# Patient Record
Sex: MALE | Race: BLACK OR AFRICAN AMERICAN | NOT HISPANIC OR LATINO | Employment: UNEMPLOYED | URBAN - METROPOLITAN AREA
[De-identification: names, ages, dates, MRNs, and addresses within clinical notes are randomized per-mention and may not be internally consistent; named-entity substitution may affect disease eponyms.]

---

## 2017-02-13 ENCOUNTER — OFFICE VISIT (OUTPATIENT)
Dept: INTERNAL MEDICINE | Facility: CLINIC | Age: 22
End: 2017-02-13
Payer: COMMERCIAL

## 2017-02-13 VITALS
HEART RATE: 95 BPM | WEIGHT: 146.38 LBS | BODY MASS INDEX: 19.4 KG/M2 | HEIGHT: 73 IN | DIASTOLIC BLOOD PRESSURE: 84 MMHG | SYSTOLIC BLOOD PRESSURE: 124 MMHG | TEMPERATURE: 98 F

## 2017-02-13 DIAGNOSIS — G44.219 EPISODIC TENSION-TYPE HEADACHE, NOT INTRACTABLE: Primary | ICD-10-CM

## 2017-02-13 PROCEDURE — 99999 PR PBB SHADOW E&M-EST. PATIENT-LVL III: CPT | Mod: PBBFAC,,, | Performed by: INTERNAL MEDICINE

## 2017-02-13 PROCEDURE — 99212 OFFICE O/P EST SF 10 MIN: CPT | Mod: S$GLB,,, | Performed by: INTERNAL MEDICINE

## 2017-02-14 NOTE — PROGRESS NOTES
Subjective:       Patient ID: Darci Vaughan Jr. is a 21 y.o. male.    Chief Complaint: Dizziness    HPI     Mr. Vaughan is a 20 yo male with hx of chronic pneumothorax s/p VATS in 2012 who presents to clinic complaining of headaches for the last 3 weeks. He states 3 weeks ago he had an episode of dizziness which he described as feeling unsteady on his feet and presented to ED at . He was given Meclizine and he states work up done there was negative although he cannot recall exactly what work up was done. He states since that time he has been having retroorbital and bilateral temporal headaches that he describes as a 6/10 pressure. He states they occur every other day and will occur 4-5 times a day. They last for around 10 minutes and resolve without intervention. He has not tried anything of the pain. The headaches only occur at work. 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 He states a few days ago he broke his glasses and has not been wearing them. He denies nausea, vomiting, SOB, neck pain or chest pain.       Review of Systems   Constitutional: Negative for chills, fatigue and fever.   HENT: Negative for facial swelling, sinus pressure, sneezing and sore throat.    Eyes: Positive for visual disturbance. Negative for photophobia.   Respiratory: Negative for cough, shortness of breath, wheezing and stridor.    Cardiovascular: Negative for chest pain, palpitations and leg swelling.   Gastrointestinal: Negative for abdominal pain, diarrhea, nausea and vomiting.   Endocrine: Negative for polydipsia and polyuria.   Genitourinary: Negative for flank pain, hematuria, testicular pain and urgency.   Musculoskeletal: Negative for arthralgias, back pain and myalgias.   Skin: Negative for pallor and rash.   Allergic/Immunologic: Negative for environmental allergies and immunocompromised state.   Neurological: Positive for dizziness and headaches. Negative for tremors, seizures, syncope, speech difficulty, weakness and numbness.    Hematological: Negative for adenopathy. Does not bruise/bleed easily.   Psychiatric/Behavioral: Negative for agitation and behavioral problems.       Objective:      Physical Exam   Constitutional: He is oriented to person, place, and time. He appears well-developed and well-nourished.   HENT:   Head: Normocephalic and atraumatic.   Eyes: EOM are normal. Pupils are equal, round, and reactive to light.   Neck: Normal range of motion. No JVD present. No thyromegaly present.   Cardiovascular: Normal rate and regular rhythm.  Exam reveals no friction rub.    No murmur heard.  Pulmonary/Chest: Effort normal and breath sounds normal. No respiratory distress.   Abdominal: Soft. He exhibits no distension. There is no tenderness.   Musculoskeletal: Normal range of motion. He exhibits no edema.   Lymphadenopathy:     He has no cervical adenopathy.   Neurological: He is alert and oriented to person, place, and time. He has normal strength. No cranial nerve deficit or sensory deficit. Coordination normal.   Skin: Skin is warm and dry. No erythema.   Psychiatric: He has a normal mood and affect. His behavior is normal.       Assessment:       1. Episodic tension-type headache, not intractable        Plan:         Darci was seen today for dizziness.    Diagnoses and all orders for this visit:    Episodic tension-type headache, not intractable    -Likely secondary to worsening vision and lack of eye glasses. Advised patient to keep optometry appointment on Friday and buy glasses. Advises patient to return to clinic if headaches do not go away one vision in corrected or if headaches become worse and he begins to experience nausea, vomiting or weakness.     Discussed with Dr. Urban.     Saray Vieyra MD PGY-2

## 2017-02-15 NOTE — PROGRESS NOTES
Patient examined, record reviewed, agree with findings and recommendations as documented above. Unclear etiology of headache and dizziness at this time. Pt instructed to get new glasses first but if symptoms persist notify the office.  Zia Urban MD  Internal Medicine

## 2017-03-21 VITALS
WEIGHT: 130 LBS | BODY MASS INDEX: 17.23 KG/M2 | DIASTOLIC BLOOD PRESSURE: 73 MMHG | HEART RATE: 88 BPM | OXYGEN SATURATION: 99 % | TEMPERATURE: 98 F | RESPIRATION RATE: 18 BRPM | SYSTOLIC BLOOD PRESSURE: 126 MMHG | HEIGHT: 73 IN

## 2017-03-21 PROCEDURE — 99284 EMERGENCY DEPT VISIT MOD MDM: CPT

## 2017-03-21 PROCEDURE — 93005 ELECTROCARDIOGRAM TRACING: CPT

## 2017-03-21 PROCEDURE — 99285 EMERGENCY DEPT VISIT HI MDM: CPT | Mod: ,,, | Performed by: EMERGENCY MEDICINE

## 2017-03-22 ENCOUNTER — HOSPITAL ENCOUNTER (EMERGENCY)
Facility: HOSPITAL | Age: 22
Discharge: HOME | End: 2017-03-22
Attending: EMERGENCY MEDICINE
Payer: COMMERCIAL

## 2017-03-22 DIAGNOSIS — R07.81 PLEURITIC CHEST PAIN: Primary | ICD-10-CM

## 2017-03-22 LAB
DEPRECATED S PYO AG THROAT QL EIA: NEGATIVE
TROPONIN I SERPL DL<=0.01 NG/ML-MCNC: <0.006 NG/ML

## 2017-03-22 PROCEDURE — 87081 CULTURE SCREEN ONLY: CPT

## 2017-03-22 PROCEDURE — 93010 ELECTROCARDIOGRAM REPORT: CPT | Mod: ,,, | Performed by: INTERNAL MEDICINE

## 2017-03-22 PROCEDURE — 84484 ASSAY OF TROPONIN QUANT: CPT

## 2017-03-22 PROCEDURE — 87880 STREP A ASSAY W/OPTIC: CPT

## 2017-03-22 RX ORDER — NAPROXEN 500 MG/1
500 TABLET ORAL 2 TIMES DAILY WITH MEALS
Qty: 60 TABLET | Refills: 0 | Status: SHIPPED | OUTPATIENT
Start: 2017-03-22 | End: 2017-07-08

## 2017-03-22 NOTE — ED TRIAGE NOTES
Darci Kilgorelinda Gonzalez, a 21 y.o. male presents to the ED c/o left sided chest pain that started last night at 11 pm. Pt states he feels like he pulled a muscle while laughing at work. Pt denies SOB at this time. Pain is described as sharp and worsens during inspiration or laughing.        Chief Complaint   Patient presents with    Muscle Pain     Patient reports that he feels like he pulled a muscle to left chest. He reports pain x 1 week     Review of patient's allergies indicates:  No Known Allergies  Past Medical History:   Diagnosis Date    Atelectasis     Spontaneous pneumothorax 10/2012

## 2017-03-22 NOTE — ED PROVIDER NOTES
Encounter Date: 3/21/2017    SCRIBE #1 NOTE: I, Lazaro Pinto, am scribing for, and in the presence of, Lyndon Melo MD.       History     Chief Complaint   Patient presents with    Muscle Pain     Patient reports that he feels like he pulled a muscle to left chest. He reports pain x 1 week     Review of patient's allergies indicates:  No Known Allergies  HPI Comments: Time seen by provider: 4:28 AM    This is a 21 y.o. male with PMHx of Spontaneous Pneumothorax x2 who presents with complaint of left sided chest pain since yesterday. Pt states while at work yesterday he felt like he pulled a muscle in his chest. Pain exacerbated with laughing, movement in certain positions and deep breathing. Denies pain when resting, cough, wheezing, SOB. States pain is not as severe as Hx of Pneumothorax.  Denies Hx of Asthma. Also c/o sore throat and bilateral ear fullness w/o pain. Denies fever or chills.  No further complaints or concerns at this time.     The history is provided by the patient and medical records.     Past Medical History:   Diagnosis Date    Atelectasis     Spontaneous pneumothorax 10/2012     Past Surgical History:   Procedure Laterality Date    BLEBECTOMY  Dec 2012    for recurrent spontaneous L pneumothorax    CHEST TUBE INSERTION       Family History   Problem Relation Age of Onset    Cancer Maternal Grandmother      breast    Emphysema Maternal Grandfather     Seizures Sister     Allergies Sister      Social History   Substance Use Topics    Smoking status: Passive Smoke Exposure - Never Smoker    Smokeless tobacco: None    Alcohol use No     Review of Systems   Constitutional: Negative for chills and fever.   HENT: Positive for sore throat.         (+) ear fullness w/o pain   Respiratory: Negative for cough, shortness of breath and wheezing.    Cardiovascular: Positive for chest pain.   Gastrointestinal: Negative for nausea.   Genitourinary: Negative for dysuria.   Musculoskeletal: Negative  for back pain.   Skin: Negative for rash.   Neurological: Negative for weakness.   Hematological: Does not bruise/bleed easily.       Physical Exam   Initial Vitals   BP Pulse Resp Temp SpO2   03/21/17 2344 03/21/17 2344 03/21/17 2344 03/21/17 2344 03/21/17 2344   126/73 88 18 98.4 °F (36.9 °C) 99 %     Physical Exam    Nursing note and vitals reviewed.  Constitutional: He appears well-developed and well-nourished. No distress.   HENT:   Head: Normocephalic and atraumatic.   Pharyngeal erythema w/o exudate. Copious cerumen in left external auditory meatus. Right TM normal.   Eyes: EOM are normal. Pupils are equal, round, and reactive to light.   Neck: Normal range of motion. Neck supple.   Cardiovascular: Normal rate, regular rhythm, normal heart sounds and intact distal pulses. Exam reveals no friction rub.    No murmur heard.  Pulmonary/Chest: Breath sounds normal. No respiratory distress. He has no wheezes. He has no rhonchi. He has no rales. He exhibits no tenderness.   No reproducible chest wall tenderness on palpation   Abdominal: Soft. He exhibits no distension. There is no tenderness. There is no rebound and no guarding.   Musculoskeletal: Normal range of motion. He exhibits no edema.   Lymphadenopathy:     He has cervical adenopathy (bilateral).   Neurological: He is alert and oriented to person, place, and time. He has normal strength. No sensory deficit.   Skin: Skin is warm and dry. No rash noted.         ED Course   Procedures  Labs Reviewed   THROAT SCREEN, RAPID   CULTURE, STREP A,  THROAT   TROPONIN I     EKG Readings: (Independently Interpreted)   EKG: NSR at 85 bpm, nl axis, nl intervals, no hypertrophy, no ST-T changes as read by me. There are no significant changes in comparison to previous EKG on 1/5/14. RBBB resolved.          X-Rays:   Independently Interpreted Readings:   Chest X-Ray: Normal heart size.  No infiltrates.  No acute abnormalities.     Medical Decision Making:   History:   Old  Medical Records: I decided to obtain old medical records.  Initial Assessment:   21 year old male presents with reproducible left sided chest pain x2 days. DDx includes but is not limited to pleurisy, MSK, chest wall pain, Pneumothorax, less likely PE, PNA and ACS. Plan to do CXR, EKG, Troponin and rapid STREP for sore throat.   Independently Interpreted Test(s):   I have ordered and independently interpreted X-rays - see prior notes.  I have ordered and independently interpreted EKG Reading(s) - see prior notes  Clinical Tests:   Lab Tests: Ordered and Reviewed  Radiological Study: Ordered and Reviewed  Medical Tests: Ordered and Reviewed            Scribe Attestation:   Scribe #1: I performed the above scribed service and the documentation accurately describes the services I performed. I attest to the accuracy of the note.    Attending Attestation:           Physician Attestation for Scribe:  Physician Attestation Statement for Scribe #1: I, Lyndon Melo MD, reviewed documentation, as scribed by Lazaro Pinto in my presence, and it is both accurate and complete.         Attending ED Notes:   Chest x-ray as independently interpreted by me was negative for any acute process including pneumothorax.  EKG was unremarkable, troponin was negative.  Normal O2 sats and vitals, no respiratory distress.  Patient's chest pain sounds pleuritic nature and I have low suspicion for PE given negative PERC rule.  Recommended anti-inflammatories as needed.  Follow-up with PCP or referred physician within one week or return for any concerns.          ED Course     Clinical Impression:   The encounter diagnosis was Pleuritic chest pain.    Disposition:   Disposition: Discharged  Condition: Stable       Lyndon Melo MD  03/26/17 0747

## 2017-03-22 NOTE — ED NOTES
Adult Physical Assessment  LOC: Darci Vaughan Jr., 21 y.o. male verified via two identifiers.  The patient is awake, alert, oriented and speaking appropriately at this time.  APPEARANCE: Patient resting comfortably and appears to be in no acute distress at this time. Patient is clean and well groomed, patient's clothing is properly fastened.  SKIN:The skin is warm and dry, color consistent with ethnicity, patient has normal skin turgor and moist mucus membranes, skin intact, no breakdown or brusing noted.  MUSCULOSKELETAL: Patient moving all extremities well, no obvious swelling or deformities noted.  RESPIRATORY: Airway is open and patent, respirations are spontaneous, patient has a normal effort and rate, no accessory muscle use noted. Pain with inspiration.  CARDIAC: Patient has a normal rate and rhythm, no periphreal edema noted in any extremity, capillary refill < 3 seconds in all extremities. Reports CP described that is sharp with laughter or breathing.  ABDOMEN: Soft and non tender to palpation, no abdominal distention noted. Bowel sounds present in all four quadrants.  NEUROLOGIC: Eyes open spontaneously, behavior appropriate to situation, follows commands, facial expression symmetrical, bilateral hand grasp equal and even, purposeful motor response noted, normal sensation in all extremities when touched with a finger.

## 2017-03-22 NOTE — ED AVS SNAPSHOT
[facility]  [address]  [facility] [address]               Darci Vaughan Jr.   3/22/2017  3:56 AM   ED    Description:  Male : 1995   Department:  [facility]           Your Care was Coordinated By:     Provider Role From To    Lyndon Melo MD Attending Provider 17 0406 --      Reason for Visit     Muscle Pain           Diagnoses this Visit        Comments    Pleuritic chest pain    -  Primary       ED Disposition     None           To Do List           Follow-up Information     Follow up with [facility]. Call in 3 days.    Specialty:  Internal Medicine    Contact information:    [address]  [facility] [address] [phone]    Additional information:    [facility]       These Medications        Disp Refills Start End    naproxen (NAPROSYN) 500 MG tablet 60 tablet 0 3/22/2017     Take 1 tablet (500 mg total) by mouth 2 (two) times daily with meals. - Oral    Pharmacy: [facility] [address] [place] [address] [address] Ph #: [phone]         [facility]     [facility] Nurse Care Line -  Assistance  Registered nurses in the [facility] provide clinical advisement, health education, appointment booking, and other advisory services.  Call for this free service at [phone].             Medications           Message regarding Medications     Verify the changes and/or additions to your medication regime listed below are the same as discussed with your clinician today.  If any of these changes or additions are incorrect, please notify your healthcare provider.        START taking these NEW medications        Refills    naproxen (NAPROSYN) 500 MG tablet 0    Sig: Take 1 tablet (500 mg total) by mouth 2 (two) times daily with meals.    Class: Print    Route: Oral           Verify that the below list of medications is an  "accurate representation of the medications you are currently taking.  If none reported, the list may be blank. If incorrect, please contact your healthcare provider. Carry this list with you in case of emergency.           Current Medications     naproxen (NAPROSYN) 500 MG tablet Take 1 tablet (500 mg total) by mouth 2 (two) times daily with meals.           Clinical Reference Information           Your Vitals Were     BP Pulse Temp Resp Height Weight    126/73 88 98.4 °F (36.9 °C) (Oral) 18 6' 1" (1.854 m) 59 kg (130 lb)    SpO2 BMI             99% 17.15 kg/m2         Allergies as of 3/22/2017     No Known Allergies      Immunizations Administered on Date of Encounter - 3/22/2017     None      ED Micro, Lab, POCT     Start Ordered       Status Ordering Provider    03/22/17 0437 03/22/17 0436  Troponin I  Once      Final result     03/22/17 0437 03/22/17 0436  Rapid strep screen  STAT      Final result     03/22/17 0436 03/22/17 0436  Strep A culture, throat  Once      In process       ED Imaging Orders     Start Ordered       Status Ordering Provider    03/22/17 0437 03/22/17 0436  X-Ray Chest PA And Lateral  1 time imaging      In process       Discharge References/Attachments     PLEURISY (ENGLISH)      [facility] Sign-Up     Activating your [facility] account is as easy as 1-2-3!     1) Visit [email or web address], select Sign Up Now, enter this activation code and your date of birth, then select Next.  Activation code not generated  Current Patient Portal Status: Account disabled      2) Create a username and password to use when you visit [facility] in the future and select a security question in case you lose your password and select Next.    3) Enter your e-mail address and click Sign Up!    Additional Information  If you have questions, please e-mail [email or web address] or call [phone] to talk to our [facility] staff. Remember, [facility] is NOT to be used for urgent needs. For medical emergencies, dial 911.    "      Smoking Cessation     If you would like to quit smoking:   You may be eligible for free services if you are a [place] resident and started smoking cigarettes before September 1, 1988.  Call the Smoking Cessation Trust (SCT) toll free at (083) 227-7802 or (254) 582-4608.   Call 1-800-QUIT-NOW if you do not meet the above criteria.             [facility] complies with applicable Federal civil rights laws and does not discriminate on the basis of race, color, national origin, age, disability, or sex.        Language Assistance Services     ATTENTION: Language assistance services are available, free of charge. Please call [phone].      ATENCIÓN: Si habla español, tiene a almazan disposición servicios gratuitos de asistencia lingüística. Llame al [phone].     CHÚ Ý: N?u b?n nói Ti?ng Vi?t, có các d?ch v? h? tr? ngôn ng? mi?n phí dành cho b?n. G?i s? [phone].

## 2017-03-24 LAB — BACTERIA THROAT CULT: NORMAL

## 2017-03-27 ENCOUNTER — TELEPHONE (OUTPATIENT)
Dept: INTERNAL MEDICINE | Facility: CLINIC | Age: 22
End: 2017-03-27

## 2017-03-27 ENCOUNTER — HOSPITAL ENCOUNTER (OUTPATIENT)
Dept: RADIOLOGY | Facility: HOSPITAL | Age: 22
Discharge: HOME | End: 2017-03-27
Attending: NURSE PRACTITIONER
Payer: COMMERCIAL

## 2017-03-27 ENCOUNTER — OFFICE VISIT (OUTPATIENT)
Dept: INTERNAL MEDICINE | Facility: CLINIC | Age: 22
End: 2017-03-27
Payer: COMMERCIAL

## 2017-03-27 VITALS
BODY MASS INDEX: 19.75 KG/M2 | HEART RATE: 93 BPM | WEIGHT: 149 LBS | DIASTOLIC BLOOD PRESSURE: 80 MMHG | OXYGEN SATURATION: 99 % | SYSTOLIC BLOOD PRESSURE: 110 MMHG | TEMPERATURE: 99 F | HEIGHT: 73 IN

## 2017-03-27 DIAGNOSIS — N50.819 TESTICLE PAIN: Primary | ICD-10-CM

## 2017-03-27 DIAGNOSIS — N50.811 PAIN IN RIGHT TESTICLE: ICD-10-CM

## 2017-03-27 DIAGNOSIS — N50.811 PAIN IN RIGHT TESTICLE: Primary | ICD-10-CM

## 2017-03-27 PROCEDURE — 99999 PR PBB SHADOW E&M-EST. PATIENT-LVL IV: CPT | Mod: PBBFAC,,, | Performed by: NURSE PRACTITIONER

## 2017-03-27 PROCEDURE — 76870 US EXAM SCROTUM: CPT | Mod: TC

## 2017-03-27 PROCEDURE — 76870 US EXAM SCROTUM: CPT | Mod: 26,,, | Performed by: RADIOLOGY

## 2017-03-27 PROCEDURE — 99213 OFFICE O/P EST LOW 20 MIN: CPT | Mod: S$GLB,,, | Performed by: NURSE PRACTITIONER

## 2017-03-27 PROCEDURE — 1160F RVW MEDS BY RX/DR IN RCRD: CPT | Mod: S$GLB,,, | Performed by: NURSE PRACTITIONER

## 2017-03-27 NOTE — MR AVS SNAPSHOT
"    [facility] - [facility]  [address]  [facility] [address]  Phone: [phone]  Fax: [phone]                  Darci Vaughan Jr.   3/27/2017 11:00 AM   Office Visit    Description:  Male : 1995   Provider:  Raul Cesar DNP   Department:  [facility]           Reason for Visit     Testicle Pain           Diagnoses this Visit        Comments    Pain in right testicle    -  Primary            To Do List           Future Appointments        Provider Department Dept Phone    3/27/2017 2:00 PM [facility] [address] [facility] [phone]      Goals (5 Years of Data)     None      [facility]     [facility] Nurse Care Line -  Assistance  Registered nurses in the [facility] provide clinical advisement, health education, appointment booking, and other advisory services.  Call for this free service at [phone].             Medications           Message regarding Medications     Verify the changes and/or additions to your medication regime listed below are the same as discussed with your clinician today.  If any of these changes or additions are incorrect, please notify your healthcare provider.             Verify that the below list of medications is an accurate representation of the medications you are currently taking.  If none reported, the list may be blank. If incorrect, please contact your healthcare provider. Carry this list with you in case of emergency.           Current Medications     naproxen (NAPROSYN) 500 MG tablet Take 1 tablet (500 mg total) by mouth 2 (two) times daily with meals.           Clinical Reference Information           Your Vitals Were     BP Pulse Temp Height Weight SpO2    110/80 93 98.9 °F (37.2 °C) 6' 1" (1.854 m) 67.6 kg (149 lb) 99%    BMI                19.66 kg/m2          Blood Pressure          Most Recent Value    BP  110/80      Allergies as of 3/27/2017     No Known Allergies    "   Immunizations Administered on Date of Encounter - 3/27/2017     None      Orders Placed During Today's Visit     Future Labs/Procedures Expected by Expires    US Scrotum And Testicles  3/27/2017 3/27/2018      [facility] Sign-Up     Activating your [facility] account is as easy as 1-2-3!     1) Visit [email or web address], select Sign Up Now, enter this activation code and your date of birth, then select Next.  [email or web address]  Expires: 5/11/2017 10:57 AM      2) Create a username and password to use when you visit [facility] in the future and select a security question in case you lose your password and select Next.    3) Enter your e-mail address and click Sign Up!    Additional Information  If you have questions, please e-mail [email or web address] or call [phone] to talk to our [facility] staff. Remember, [facility] is NOT to be used for urgent needs. For medical emergencies, dial 911.         Instructions      Testicular Pain, Unclear Cause  You have had pain in one or both testicles. Based on your exam today, the exact cause of your pain is not certain. But your condition does not appear to be dangerous. Testicles are very sensitive. Even a small injury can cause quite a bit of pain. Other possible causes of testicular pain include kidney stones, cysts, mumps, inflammatory conditions, chronic conditions, hernia, infection, and a twisted testicle.  Certain tests may be done to rule out an underlying problem causing the pain. Nothing conclusive was found today. Most likely, the pain will go away on its own. If it doesnt, you may need more tests.    Home care  Medicine may be prescribed to help relieve pain and swelling. This may be an over-the-counter pain reliever or prescription pain medication. Take all medicine as directed.  The following are general care guidelines:  · To relieve pain and swelling, apply an ice pack wrapped in a thin towel for 10 minutes at a time. Continue this on and off for 1 to 2  days.  · When lying down, place a small rolled towel under your scrotum. When moving around, wear a jockstrap (athletic supporter) or supportive underwear. These will help support and protect your testicles.  · If it hurts to walk, walk as little as possible until you feel better.  · Avoid strenuous activity until you feel better.  · Do not have sex until you feel better.  · If you have severe pain in the testicle, seek care right away. Delay may lead to permanent loss of the testicles function.  Follow-up care  Follow up with your healthcare provider, or as advised.  When to seek medical advice  Call your healthcare provider right away if any of these occur:  · Fever of 100.4°F (38°C) or higher  · Worsening of the pain or severe pain  · Swelling of the testicle or scrotum  · A lump in the scrotum  · Warm and red scrotum (signs of infection)  · Nausea and vomiting  · Pain or swelling in abdomen  · Trouble urinating  · Numbness or weakness in the leg  · Shrinking of the testicle  · Blood in your urine  Date Last Reviewed: 10/1/2016  © 8970-0733 [employer]. [address] [place] [address]. All rights reserved. This information is not intended as a substitute for professional medical care. Always follow your healthcare professional's instructions.             Language Assistance Services     ATTENTION: Language assistance services are available, free of charge. Please call [phone].      ATENCIÓN: Si habla español, tiene a almazan disposición servicios gratuitos de asistencia lingüística. Llame al [phone].     CHÚ Ý: N?u b?n nói Ti?ng Vi?t, có các d?ch v? h? tr? ngôn ng? mi?n phí dành cho b?n. G?i s? [phone].         [facility] complies with applicable Federal civil rights laws and does not discriminate on the basis of race, color, national origin, age, disability, or sex.

## 2017-03-27 NOTE — TELEPHONE ENCOUNTER
Called and spoke to pt and informed of test results. Offered to make an apt, and pt decided he will call them to schedule.

## 2017-03-27 NOTE — PROGRESS NOTES
Subjective:       Patient ID: Darci Vaughan Jr. is a 21 y.o. male.    Chief Complaint: Testicle Pain    Testicle Pain   The patient's primary symptoms include scrotal swelling and testicular pain. This is a new problem. The current episode started in the past 7 days (3d). The problem occurs constantly. The problem has been gradually worsening. The pain is severe. Pertinent negatives include no abdominal pain, chest pain, chills, constipation, coughing, diarrhea, dysuria, fever, frequency, headaches, nausea, rash, shortness of breath, sore throat, urgency or vomiting. The testicular pain affects the right testicle. There is swelling in the right testicle. The color of the testicles is normal. Nothing aggravates the symptoms. He has tried nothing for the symptoms. The treatment provided no relief.     Review of Systems   Constitutional: Negative for activity change, appetite change, chills, fever and unexpected weight change.   HENT: Negative for congestion, ear discharge, ear pain, nosebleeds, sinus pressure, sneezing and sore throat.    Eyes: Negative for photophobia, discharge, redness, itching and visual disturbance.   Respiratory: Negative for cough, chest tightness, shortness of breath, wheezing and stridor.    Cardiovascular: Negative for chest pain, palpitations and leg swelling.   Gastrointestinal: Negative for abdominal distention, abdominal pain, blood in stool, constipation, diarrhea, nausea and vomiting.   Genitourinary: Positive for scrotal swelling and testicular pain. Negative for dysuria, frequency, hematuria and urgency.   Musculoskeletal: Negative for arthralgias, back pain, myalgias, neck pain and neck stiffness.   Skin: Negative for rash and wound.   Neurological: Negative for dizziness, light-headedness, numbness and headaches.   Hematological: Negative for adenopathy. Does not bruise/bleed easily.   Psychiatric/Behavioral: Negative for suicidal ideas.       Objective:      Physical Exam  "  Constitutional: He is oriented to person, place, and time. He appears well-developed and well-nourished.   HENT:   Head: Normocephalic and atraumatic.   Eyes: Pupils are equal, round, and reactive to light.   Neck: Normal range of motion.   Pulmonary/Chest: Effort normal. No respiratory distress.   Abdominal: He exhibits no distension.   Genitourinary: Penis normal. Right testis shows tenderness. Right testis shows no mass and no swelling. Right testis is descended. Cremasteric reflex is not absent on the right side. Left testis shows no mass, no swelling and no tenderness. Left testis is descended. Cremasteric reflex is not absent on the left side. Circumcised.   Musculoskeletal: Normal range of motion.   Neurological: He is alert and oriented to person, place, and time.   Skin: Skin is warm and dry.       Assessment:       1. Pain in right testicle        Plan:       Darci was seen today for testicle pain.    Diagnoses and all orders for this visit:    Pain in right testicle  -     US Scrotum And Testicles; Future    Pt has been given instructions populated from [employer] database and has verbalized understanding of the after visit summary and information contained wherein.    Follow up with a primary care provider. May go to ER for acute shortness of breath, lightheadedness, fever, or any other emergent complaints or changes in condition.    "This note will be shared with the patient"    The [employer] medical Dictation software was used during the dictation of portions or the entirety of this medical record.  Phonetic or grammatic errors may exist due to the use of this software. For clarification, refer to the author of the document.             "

## 2017-03-27 NOTE — PATIENT INSTRUCTIONS
Testicular Pain, Unclear Cause  You have had pain in one or both testicles. Based on your exam today, the exact cause of your pain is not certain. But your condition does not appear to be dangerous. Testicles are very sensitive. Even a small injury can cause quite a bit of pain. Other possible causes of testicular pain include kidney stones, cysts, mumps, inflammatory conditions, chronic conditions, hernia, infection, and a twisted testicle.  Certain tests may be done to rule out an underlying problem causing the pain. Nothing conclusive was found today. Most likely, the pain will go away on its own. If it doesnt, you may need more tests.    Home care  Medicine may be prescribed to help relieve pain and swelling. This may be an over-the-counter pain reliever or prescription pain medication. Take all medicine as directed.  The following are general care guidelines:  · To relieve pain and swelling, apply an ice pack wrapped in a thin towel for 10 minutes at a time. Continue this on and off for 1 to 2 days.  · When lying down, place a small rolled towel under your scrotum. When moving around, wear a jockstrap (athletic supporter) or supportive underwear. These will help support and protect your testicles.  · If it hurts to walk, walk as little as possible until you feel better.  · Avoid strenuous activity until you feel better.  · Do not have sex until you feel better.  · If you have severe pain in the testicle, seek care right away. Delay may lead to permanent loss of the testicles function.  Follow-up care  Follow up with your healthcare provider, or as advised.  When to seek medical advice  Call your healthcare provider right away if any of these occur:  · Fever of 100.4°F (38°C) or higher  · Worsening of the pain or severe pain  · Swelling of the testicle or scrotum  · A lump in the scrotum  · Warm and red scrotum (signs of infection)  · Nausea and vomiting  · Pain or swelling in abdomen  · Trouble  urinating  · Numbness or weakness in the leg  · Shrinking of the testicle  · Blood in your urine  Date Last Reviewed: 10/1/2016  © 3841-0649 [employer]. [address] [place] [address], [place] [address]. All rights reserved. This information is not intended as a substitute for professional medical care. Always follow your healthcare professional's instructions.

## 2017-03-27 NOTE — TELEPHONE ENCOUNTER
Please let the patient know that the ultrasound was negative for anything that might cause the pain in the testicle.  It was largely unchanged from the previous ultrasound.  He should follow up with urology.  I have ordered the consult, please help him to schedule.

## 2017-04-05 ENCOUNTER — OFFICE VISIT (OUTPATIENT)
Dept: UROLOGY | Facility: CLINIC | Age: 22
End: 2017-04-05
Payer: COMMERCIAL

## 2017-04-05 VITALS
DIASTOLIC BLOOD PRESSURE: 87 MMHG | SYSTOLIC BLOOD PRESSURE: 151 MMHG | WEIGHT: 147 LBS | HEART RATE: 110 BPM | BODY MASS INDEX: 19.39 KG/M2

## 2017-04-05 DIAGNOSIS — N43.40 SPERMATOCELE: ICD-10-CM

## 2017-04-05 DIAGNOSIS — N50.89 EPIDIDYMAL MASS: ICD-10-CM

## 2017-04-05 DIAGNOSIS — N50.819 TESTICLE PAIN: Primary | ICD-10-CM

## 2017-04-05 PROCEDURE — 99203 OFFICE O/P NEW LOW 30 MIN: CPT | Mod: S$GLB,,, | Performed by: NURSE PRACTITIONER

## 2017-04-05 PROCEDURE — 99999 PR PBB SHADOW E&M-EST. PATIENT-LVL III: CPT | Mod: PBBFAC,,, | Performed by: NURSE PRACTITIONER

## 2017-04-05 PROCEDURE — 1160F RVW MEDS BY RX/DR IN RCRD: CPT | Mod: S$GLB,,, | Performed by: NURSE PRACTITIONER

## 2017-04-05 RX ORDER — MECLIZINE HYDROCHLORIDE 25 MG/1
TABLET ORAL
Refills: 0 | COMMUNITY
Start: 2017-01-17 | End: 2017-08-16

## 2017-04-05 NOTE — PATIENT INSTRUCTIONS
Testicular Pain, Unclear Cause  You have had pain in one or both testicles. Based on your exam today, the exact cause of your pain is not certain. But your condition does not appear to be dangerous. Testicles are very sensitive. Even a small injury can cause quite a bit of pain. Other possible causes of testicular pain include kidney stones, cysts, mumps, inflammatory conditions, chronic conditions, hernia, infection, and a twisted testicle.  Certain tests may be done to rule out an underlying problem causing the pain. Nothing conclusive was found today. Most likely, the pain will go away on its own. If it doesnt, you may need more tests.    Home care  Medicine may be prescribed to help relieve pain and swelling. This may be an over-the-counter pain reliever or prescription pain medication. Take all medicine as directed.  The following are general care guidelines:  · To relieve pain and swelling, apply an ice pack wrapped in a thin towel for 10 minutes at a time. Continue this on and off for 1 to 2 days.  · When lying down, place a small rolled towel under your scrotum. When moving around, wear a jockstrap (athletic supporter) or supportive underwear. These will help support and protect your testicles.  · If it hurts to walk, walk as little as possible until you feel better.  · Avoid strenuous activity until you feel better.  · Do not have sex until you feel better.  · If you have severe pain in the testicle, seek care right away. Delay may lead to permanent loss of the testicles function.  Follow-up care  Follow up with your healthcare provider, or as advised.  When to seek medical advice  Call your healthcare provider right away if any of these occur:  · Fever of 100.4°F (38°C) or higher  · Worsening of the pain or severe pain  · Swelling of the testicle or scrotum  · A lump in the scrotum  · Warm and red scrotum (signs of infection)  · Nausea and vomiting  · Pain or swelling in abdomen  · Trouble  urinating  · Numbness or weakness in the leg  · Shrinking of the testicle  · Blood in your urine  Date Last Reviewed: 10/1/2016  © 0548-0951 [employer]. [address] [place] [address], [place] [address]. All rights reserved. This information is not intended as a substitute for professional medical care. Always follow your healthcare professional's instructions.

## 2017-04-05 NOTE — PROGRESS NOTES
Subjective:       Patient ID: Darci Vaughan Jr. is a 21 y.o. male.    Chief Complaint: Testicle Pain    HPI Comments: Darci Vaughan Jr. is a 21 y.o. Male here today for testicle pain x 2-3 weeks.  He has been seen by PCP currently taking Naprosyn and notes some improvement  He denies trauma to scrotum.  No urinary complaints  Reports not sexually active; no pain with erections.    He doesn't play sports but does a lot of lifting at work.      03/27/2017 Scrotal Sono:  There is a 6 x 5 x 5 mm epididymal cyst or spermatocele associated with the left epididymal head, new since 5/15/15.    Superior to the right testicle, there is a 9 x 9 x 10 mm hypoechoic solid mass, possibly related to the right epididymis.           Past Medical History:  No date: Atelectasis  10/2012: Spontaneous pneumothorax    Past Surgical History:  Dec 2012: BLEBECTOMY      Comment: for recurrent spontaneous L pneumothorax  No date: CHEST TUBE INSERTION    Review of patient's family history indicates:    Cancer                         Maternal Grandmother        Comment: breast    Emphysema                      Maternal Grandfather      Seizures                       Sister                    Allergies                      Sister                      Social History    Marital status: Single              Spouse name:                       Years of education:                 Number of children:               Occupational History    None on file    Social History Main Topics    Smoking status: Passive Smoke Exposure - Never Smoker                                        Packs/day: 0.00      Years: 0.00           Smokeless status: Not on file                       Alcohol use: No              Drug use: No              Sexual activity: No                   Other Topics            Concern    None on file    Social History Narrative    Lives at home with mom, mom's boyfriend, and 2 sisters. 1 dog. Mom's boyfriend smokes outside. Attends [employer]  grade. Plays basketball.        Allergies:  Review of patient's allergies indicates no known allergies.    Medications:  Current Outpatient Prescriptions:   meclizine (ANTIVERT) 25 mg tablet, TAKE 1 TABLET BY MOUTH 3 TIMES A DAY FOR 5 DAYS AS NEEDED FOR DIZZINESS, Disp: , Rfl: 0  naproxen (NAPROSYN) 500 MG tablet, Take 1 tablet (500 mg total) by mouth 2 (two) times daily with meals., Disp: 60 tablet, Rfl: 0        Review of Systems   Constitutional: Negative.  Negative for activity change, appetite change and fever.   HENT: Negative.  Negative for facial swelling and trouble swallowing.    Eyes: Negative.    Respiratory: Negative.  Negative for shortness of breath.    Cardiovascular: Negative.  Negative for chest pain and palpitations.   Gastrointestinal: Negative for abdominal pain, constipation, diarrhea, nausea and vomiting.   Genitourinary: Positive for testicular pain. Negative for difficulty urinating, dysuria, enuresis, flank pain, frequency, genital sores, hematuria, nocturia, penile pain, scrotal swelling and urgency.        Pain can vary; left side/right side;  No swelling.   Ok with how he urinates.     Musculoskeletal: Negative for back pain, gait problem and neck stiffness.   Skin: Negative.  Negative for wound.   Neurological: Negative for dizziness, tremors, seizures, syncope, speech difficulty, light-headedness and headaches.   Hematological: Does not bruise/bleed easily.   Psychiatric/Behavioral: Negative for confusion and hallucinations. The patient is not nervous/anxious.        Objective:      Physical Exam   Nursing note and vitals reviewed.  Constitutional: He is oriented to person, place, and time. Vital signs are normal. He appears well-developed and well-nourished. He is active and cooperative.  Non-toxic appearance. He does not have a sickly appearance.   Unable to urinate in office (just went).     HENT:   Head: Normocephalic and atraumatic.   Right Ear: External ear normal.   Left Ear:  External ear normal.   Nose: Nose normal.   Mouth/Throat: Mucous membranes are normal.   Eyes: Conjunctivae and lids are normal. No scleral icterus.   Neck: Trachea normal, normal range of motion and full passive range of motion without pain. Neck supple. No JVD present. No tracheal deviation present.   Cardiovascular: Normal rate, regular rhythm, S1 normal and S2 normal.    Pulmonary/Chest: Effort normal and breath sounds normal. No respiratory distress. He exhibits no tenderness.   Abdominal: Soft. Normal appearance and bowel sounds are normal. There is no hepatosplenomegaly. There is no tenderness. There is no rebound, no guarding and no CVA tenderness.   Genitourinary: Penis normal. Right testis shows mass. Right testis shows no swelling and no tenderness. Left testis shows no swelling and no tenderness. Circumcised. No hypospadias, penile erythema or penile tenderness. No discharge found.         Musculoskeletal: Normal range of motion.   Neurological: He is alert and oriented to person, place, and time. He has normal strength.   Skin: Skin is warm, dry and intact.     Psychiatric: He has a normal mood and affect. His behavior is normal. Judgment and thought content normal.       Assessment:       1. Testicle pain    2. Spermatocele    3. Epididymal mass        Plan:         I spent 25 minutes with the patient of which more than half was spent in direct consultation with the patient in regards to our treatment and plan.    Education and recommendations of today's plan of care including home remedies.  Discussed findings are benign findings; reassurance given.  Contributing factors.   He is to continue Naprosyn as ordered;  Good scrotal support especially with working/exercise.   RTC if worsens/no improvement with recommendations.

## 2017-04-05 NOTE — MR AVS SNAPSHOT
[facility]  [address]  [facility] [address]  Phone: [phone]                  Darci Vaughan Jr.   2017 9:40 AM   Office Visit    Description:  Male : 1995   Provider:  Emilee Matta NP   Department:  [facility]           Reason for Visit     Testicle Pain           Diagnoses this Visit        Comments    Testicle pain    -  Primary     Spermatocele         Sperm granuloma of epididymis                To Do List           Goals (5 Years of Data)     None      Follow-Up and Disposition     Return in about 3 months (around 2017).      [facility]     [facility] Nurse Care Line -  Assistance  Unless otherwise directed by your provider, please contact [facility], our nurse care line that is available for  assistance.     Registered nurses in the [facility] Center provide: appointment scheduling, clinical advisement, health education, and other advisory services.  Call: [phone] (toll free)               Medications           Message regarding Medications     Verify the changes and/or additions to your medication regime listed below are the same as discussed with your clinician today.  If any of these changes or additions are incorrect, please notify your healthcare provider.             Verify that the below list of medications is an accurate representation of the medications you are currently taking.  If none reported, the list may be blank. If incorrect, please contact your healthcare provider. Carry this list with you in case of emergency.           Current Medications     meclizine (ANTIVERT) 25 mg tablet TAKE 1 TABLET BY MOUTH 3 TIMES A DAY FOR 5 DAYS AS NEEDED FOR DIZZINESS    naproxen (NAPROSYN) 500 MG tablet Take 1 tablet (500 mg total) by mouth 2 (two) times daily with meals.           Clinical Reference Information           Your Vitals Were     BP Pulse Weight BMI       151/87 (BP Location: Right arm,  Patient Position: Sitting, BP Method: Automatic) 110 66.7 kg (147 lb) 19.39 kg/m2       Blood Pressure          Most Recent Value    BP  (!)  151/87      Allergies as of 4/5/2017     No Known Allergies      Immunizations Administered on Date of Encounter - 4/5/2017     None      Orders Placed During Today's Visit      Normal Orders This Visit    POCT urine dipstick without microscope       [facility] Sign-Up     Activating your [facility] account is as easy as 1-2-3!     1) Visit [email or web address], select Sign Up Now, enter this activation code and your date of birth, then select Next.  [email or web address]  Expires: 5/11/2017 10:57 AM      2) Create a username and password to use when you visit [facility] in the future and select a security question in case you lose your password and select Next.    3) Enter your e-mail address and click Sign Up!    Additional Information  If you have questions, please e-mail [email or web address] or call [phone] to talk to our [facility] staff. Remember, [facility] is NOT to be used for urgent needs. For medical emergencies, dial 911.         Instructions      Testicular Pain, Unclear Cause  You have had pain in one or both testicles. Based on your exam today, the exact cause of your pain is not certain. But your condition does not appear to be dangerous. Testicles are very sensitive. Even a small injury can cause quite a bit of pain. Other possible causes of testicular pain include kidney stones, cysts, mumps, inflammatory conditions, chronic conditions, hernia, infection, and a twisted testicle.  Certain tests may be done to rule out an underlying problem causing the pain. Nothing conclusive was found today. Most likely, the pain will go away on its own. If it doesnt, you may need more tests.    Home care  Medicine may be prescribed to help relieve pain and swelling. This may be an over-the-counter pain reliever or prescription pain medication. Take all medicine as directed.  The  following are general care guidelines:  · To relieve pain and swelling, apply an ice pack wrapped in a thin towel for 10 minutes at a time. Continue this on and off for 1 to 2 days.  · When lying down, place a small rolled towel under your scrotum. When moving around, wear a jockstrap (athletic supporter) or supportive underwear. These will help support and protect your testicles.  · If it hurts to walk, walk as little as possible until you feel better.  · Avoid strenuous activity until you feel better.  · Do not have sex until you feel better.  · If you have severe pain in the testicle, seek care right away. Delay may lead to permanent loss of the testicles function.  Follow-up care  Follow up with your healthcare provider, or as advised.  When to seek medical advice  Call your healthcare provider right away if any of these occur:  · Fever of 100.4°F (38°C) or higher  · Worsening of the pain or severe pain  · Swelling of the testicle or scrotum  · A lump in the scrotum  · Warm and red scrotum (signs of infection)  · Nausea and vomiting  · Pain or swelling in abdomen  · Trouble urinating  · Numbness or weakness in the leg  · Shrinking of the testicle  · Blood in your urine  Date Last Reviewed: 10/1/2016  © 8084-3313 [employer]. [address] [place] [address]. All rights reserved. This information is not intended as a substitute for professional medical care. Always follow your healthcare professional's instructions.             Language Assistance Services     ATTENTION: Language assistance services are available, free of charge. Please call [phone].      ATENCIÓN: Si habla español, tiene a almazan disposición servicios gratuitos de asistencia lingüística. Llame al [phone].     ALEJO Ý: N?u b?n nói Ti?ng Vi?t, có các d?ch v? h? tr? ngôn ng? mi?n phí dành cho b?n. G?i s? [phone].         [facility] complies with applicable Federal civil rights laws and does not  discriminate on the basis of race, color, national origin, age, disability, or sex.

## 2017-04-05 NOTE — LETTER
April 5, 2017      Raul Cesar DNP  [address]  [place] [address]           [facility]  [address]  [facility] [address]  Phone: [phone]          Patient: Darci Vaughan Jr.   MR Number: [id number]   YOB: 1995   Date of Visit: 4/5/2017       Dear Raul Cesar:    Thank you for referring Darci Vaughan to me for evaluation. Attached you will find relevant portions of my assessment and plan of care.    If you have questions, please do not hesitate to call me. I look forward to following Darci Vaughan along with you.    Sincerely,    Emilee Matta, NP    Enclosure  CC:  No Recipients    If you would like to receive this communication electronically, please contact [email or web address] or [phone] to request more information on [facility] access.    For providers and/or their staff who would like to refer a patient to [facility], please contact us through our one-stop-shop provider referral line, [facility], at [phone].    If you feel you have received this communication in error or would no longer like to receive these types of communications, please e-mail [email or web address]

## 2017-05-26 ENCOUNTER — OFFICE VISIT (OUTPATIENT)
Dept: INTERNAL MEDICINE | Facility: CLINIC | Age: 22
End: 2017-05-26
Payer: COMMERCIAL

## 2017-05-26 ENCOUNTER — LAB VISIT (OUTPATIENT)
Dept: LAB | Facility: HOSPITAL | Age: 22
End: 2017-05-26
Attending: INTERNAL MEDICINE
Payer: COMMERCIAL

## 2017-05-26 VITALS
OXYGEN SATURATION: 98 % | WEIGHT: 144.19 LBS | BODY MASS INDEX: 19.11 KG/M2 | SYSTOLIC BLOOD PRESSURE: 124 MMHG | TEMPERATURE: 98 F | DIASTOLIC BLOOD PRESSURE: 78 MMHG | HEART RATE: 85 BPM | HEIGHT: 73 IN

## 2017-05-26 DIAGNOSIS — R51.9 HEADACHE, UNSPECIFIED HEADACHE TYPE: ICD-10-CM

## 2017-05-26 DIAGNOSIS — R51.9 HEADACHE, UNSPECIFIED HEADACHE TYPE: Primary | ICD-10-CM

## 2017-05-26 LAB
ALBUMIN SERPL BCP-MCNC: 4.3 G/DL
ALP SERPL-CCNC: 80 U/L
ALT SERPL W/O P-5'-P-CCNC: 14 U/L
ANION GAP SERPL CALC-SCNC: 6 MMOL/L
AST SERPL-CCNC: 17 U/L
BASOPHILS # BLD AUTO: 0.03 K/UL
BASOPHILS NFR BLD: 0.4 %
BILIRUB DIRECT SERPL-MCNC: 0.2 MG/DL
BILIRUB SERPL-MCNC: 0.5 MG/DL
BUN SERPL-MCNC: 11 MG/DL
CALCIUM SERPL-MCNC: 9.7 MG/DL
CHLORIDE SERPL-SCNC: 103 MMOL/L
CO2 SERPL-SCNC: 31 MMOL/L
CREAT SERPL-MCNC: 1 MG/DL
DIFFERENTIAL METHOD: NORMAL
EOSINOPHIL # BLD AUTO: 0.1 K/UL
EOSINOPHIL NFR BLD: 0.9 %
ERYTHROCYTE [DISTWIDTH] IN BLOOD BY AUTOMATED COUNT: 14.1 %
EST. GFR  (AFRICAN AMERICAN): >60 ML/MIN/1.73 M^2
EST. GFR  (NON AFRICAN AMERICAN): >60 ML/MIN/1.73 M^2
GLUCOSE SERPL-MCNC: 85 MG/DL
HCT VFR BLD AUTO: 47.1 %
HGB BLD-MCNC: 15.2 G/DL
LYMPHOCYTES # BLD AUTO: 1.8 K/UL
LYMPHOCYTES NFR BLD: 23.6 %
MCH RBC QN AUTO: 27.8 PG
MCHC RBC AUTO-ENTMCNC: 32.3 %
MCV RBC AUTO: 86 FL
MONOCYTES # BLD AUTO: 0.9 K/UL
MONOCYTES NFR BLD: 12.1 %
NEUTROPHILS # BLD AUTO: 4.8 K/UL
NEUTROPHILS NFR BLD: 62.9 %
PLATELET # BLD AUTO: 258 K/UL
PMV BLD AUTO: 9.7 FL
POTASSIUM SERPL-SCNC: 4.3 MMOL/L
PROT SERPL-MCNC: 7.6 G/DL
RBC # BLD AUTO: 5.47 M/UL
SODIUM SERPL-SCNC: 140 MMOL/L
WBC # BLD AUTO: 7.59 K/UL

## 2017-05-26 PROCEDURE — 99213 OFFICE O/P EST LOW 20 MIN: CPT | Mod: S$GLB,,, | Performed by: INTERNAL MEDICINE

## 2017-05-26 PROCEDURE — 80048 BASIC METABOLIC PNL TOTAL CA: CPT

## 2017-05-26 PROCEDURE — 99999 PR PBB SHADOW E&M-EST. PATIENT-LVL III: CPT | Mod: PBBFAC,,, | Performed by: INTERNAL MEDICINE

## 2017-05-26 PROCEDURE — 85025 COMPLETE CBC W/AUTO DIFF WBC: CPT

## 2017-05-26 PROCEDURE — 36415 COLL VENOUS BLD VENIPUNCTURE: CPT

## 2017-05-26 PROCEDURE — 80076 HEPATIC FUNCTION PANEL: CPT

## 2017-05-26 NOTE — PROGRESS NOTES
"Subjective:       Patient ID: Darci Vaughan Jr. is a 21 y.o. male.    Chief Complaint: Fatigue; Dizziness; and Headache   this is a 21-year-old who presents today for urgent visit with headache.  Patient reports she's had intermittent headaches that bother him on occasion he had a headache yesterday at work and sometimes gets them twice a day sometimes not at all. he occasionally will feel dizzy associated with headaches but denies any other neurologic symtpoms. .he he sought attention previously was given Antivert in the past when he went to an [facility] hospital but did not feel it helped his symptoms. He  will usually just rest takes it easy and the headaches go away.  He occasionally does take a over-the-counter medication but usually he just stops what he is doing in the headaches will resolve quickly he denies any other associated neurologic symptoms no weakness.  He does feel tired at times and associates that to the headaches.  They usually are temopral but occasional in the back. He denies associated nubmness or vision changes. He denies any drug or alcohol use he has trouble with nasal congestion but no fevers or cough.  He denies joint pain.  He denies chest pain or shortness of breath    HPI  Review of Systems   Constitutional: Positive for fatigue.   Respiratory: Negative for shortness of breath.    Cardiovascular: Negative for chest pain and leg swelling.   Neurological: Positive for headaches.       Objective:     Blood pressure 124/78, pulse 85, temperature 98.1 °F (36.7 °C), height 6' 1" (1.854 m), weight 65.4 kg (144 lb 2.9 oz), SpO2 98 %.    Physical Exam   Constitutional: No distress.   Thin    HENT:   Head: Normocephalic.   Mouth/Throat: Oropharynx is clear and moist.   Eyes: No scleral icterus.   Neck: Neck supple.   Cardiovascular: Normal rate, regular rhythm and normal heart sounds.  Exam reveals no gallop and no friction rub.    No murmur heard.  Pulmonary/Chest: Effort normal and breath " sounds normal. No respiratory distress.   Abdominal: Soft. Bowel sounds are normal. He exhibits no mass. There is no tenderness.   Musculoskeletal: He exhibits no edema.   Neurological: He is alert. He displays normal reflexes. No cranial nerve deficit. Coordination normal.   Skin: No erythema.   Psychiatric: He has a normal mood and affect.   Vitals reviewed.      Assessment:       1. Headache, unspecified headache type        Plan:       Darci was seen today for fatigue, dizziness and headache.    Diagnoses and all orders for this visit:    Headache, unspecified headache type  Discussed with patient he has intermittent tension headaches but also gets mild associated vertigo at times he tends to relate to not eating well and we discussed eating smaller meals throughout the day making sure to hydrate and eat regularly which may contribute to fatigue and headaches as well  We discussed regular eye exams he reports is up-to-date and did have some recent glasses changed we discussed eye rest when using computers   -     Basic metabolic panel; Future  -     CBC auto differential; Future  -     Hepatic function panel; Future  Discussed neurology appt or additional evaluation imaging if symtpoms persist or concerns

## 2017-05-29 ENCOUNTER — TELEPHONE (OUTPATIENT)
Dept: INTERNAL MEDICINE | Facility: CLINIC | Age: 22
End: 2017-05-29

## 2017-05-29 NOTE — TELEPHONE ENCOUNTER
----- Message from Anita Rodrigues MD sent at 5/27/2017  8:55 AM CDT -----  Call and notify pt his bloodwork was normal

## 2017-06-08 ENCOUNTER — HOSPITAL ENCOUNTER (EMERGENCY)
Facility: HOSPITAL | Age: 22
Discharge: HOME | End: 2017-06-08
Attending: EMERGENCY MEDICINE
Payer: COMMERCIAL

## 2017-06-08 VITALS
DIASTOLIC BLOOD PRESSURE: 89 MMHG | HEART RATE: 100 BPM | SYSTOLIC BLOOD PRESSURE: 127 MMHG | OXYGEN SATURATION: 100 % | BODY MASS INDEX: 18.87 KG/M2 | WEIGHT: 143 LBS | TEMPERATURE: 98 F | RESPIRATION RATE: 20 BRPM

## 2017-06-08 DIAGNOSIS — R00.2 PALPITATIONS: ICD-10-CM

## 2017-06-08 PROCEDURE — 99284 EMERGENCY DEPT VISIT MOD MDM: CPT | Mod: ,,, | Performed by: EMERGENCY MEDICINE

## 2017-06-08 PROCEDURE — 93005 ELECTROCARDIOGRAM TRACING: CPT

## 2017-06-08 PROCEDURE — 93010 ELECTROCARDIOGRAM REPORT: CPT | Mod: S$GLB,,, | Performed by: INTERNAL MEDICINE

## 2017-06-08 PROCEDURE — 25000003 PHARM REV CODE 250: Performed by: STUDENT IN AN ORGANIZED HEALTH CARE EDUCATION/TRAINING PROGRAM

## 2017-06-08 PROCEDURE — 99283 EMERGENCY DEPT VISIT LOW MDM: CPT | Mod: 25

## 2017-06-08 RX ORDER — IBUPROFEN 600 MG/1
600 TABLET ORAL
Status: COMPLETED | OUTPATIENT
Start: 2017-06-08 | End: 2017-06-08

## 2017-06-08 RX ADMIN — IBUPROFEN 600 MG: 600 TABLET, FILM COATED ORAL at 03:06

## 2017-06-08 NOTE — ED TRIAGE NOTES
"Pt reports he was playing a game when his heart started racing and having "warmness" to left chest. +Sob  "

## 2017-06-08 NOTE — ED PROVIDER NOTES
"Encounter Date: 6/8/2017    SCRIBE #1 NOTE: I, Luis Zhao, am scribing for, and in the presence of,  Dr. High. I have scribed the following portions of the note - the EKG reading.       History     Chief Complaint   Patient presents with    Palpitations     Pt reports he was playing a game when his heart started racing and having "warmness" to left chest. +Sob.     Review of patient's allergies indicates:  No Known Allergies  Darci Vaughan is a 22 yo M with PMH of spontaneous PTX presents with 5 minutes of left sided chest pain and 20 minutes of chest warmth 20 minutes PTA. The patient reports that he has had some similar pain when he is at work picking up water. The patient endorses palpitations. The patient reports that the pain is worse when he breathes and does not change with palpation. He endorses mild epigastric discomfort. He denies N/V/D/C, SOB, fevers, chills.      The history is provided by the patient.     Past Medical History:   Diagnosis Date    Atelectasis     Spontaneous pneumothorax 10/2012     Past Surgical History:   Procedure Laterality Date    BLEBECTOMY  Dec 2012    for recurrent spontaneous L pneumothorax    CHEST TUBE INSERTION       Family History   Problem Relation Age of Onset    Cancer Maternal Grandmother      breast    Emphysema Maternal Grandfather     Seizures Sister     Allergies Sister      Social History   Substance Use Topics    Smoking status: Passive Smoke Exposure - Never Smoker    Smokeless tobacco: Not on file    Alcohol use No     Review of Systems   Constitutional: Negative for diaphoresis and fever.   HENT: Negative for rhinorrhea and sore throat.    Eyes: Negative for photophobia and visual disturbance.   Respiratory: Negative for shortness of breath and wheezing.    Cardiovascular: Positive for chest pain and palpitations.   Gastrointestinal: Negative for abdominal pain, nausea and vomiting.   Endocrine: Negative for polydipsia and polyuria. "   Genitourinary: Negative for dysuria and hematuria.   Musculoskeletal: Negative for back pain and neck pain.   Skin: Negative for rash and wound.   Neurological: Negative for weakness and numbness.   Hematological: Does not bruise/bleed easily.   All other systems reviewed and are negative.      Physical Exam     Initial Vitals [06/08/17 0305]   BP Pulse Resp Temp SpO2   (!) 173/94 (!) 118 20 97.5 °F (36.4 °C) 100 %     Physical Exam    Nursing note and vitals reviewed.  Constitutional: He appears well-developed and well-nourished.   Patient is lying in bed supine and appears anxious   HENT:   Head: Normocephalic and atraumatic.   Mouth/Throat: No oropharyngeal exudate.   Eyes: EOM are normal. Pupils are equal, round, and reactive to light. No scleral icterus.   Neck: Normal range of motion. Neck supple. No JVD present.   Cardiovascular: Normal rate, regular rhythm and normal heart sounds. Exam reveals no gallop and no friction rub.    No murmur heard.  Normal bedside echo   Pulmonary/Chest: Breath sounds normal. No respiratory distress. He has no wheezes. He has no rhonchi. He has no rales. He exhibits no tenderness.   Bedside US demonstrates normal slide sign on both upper and lower lungs b/l   Abdominal: Soft. Bowel sounds are normal. He exhibits no distension and no mass. There is no tenderness. There is no rebound and no guarding.   Musculoskeletal: Normal range of motion. He exhibits no edema.   Neurological: He is oriented to person, place, and time. He has normal strength and normal reflexes. No cranial nerve deficit.   Skin: Capillary refill takes less than 2 seconds.         ED Course   Procedures  Labs Reviewed - No data to display  EKG Readings: (Independently Interpreted)   Heart Rate: 111.   Sinus tachycardia, QT and QRS intervals are normal.           Medical Decision Making:   History:   Old Medical Records: I decided to obtain old medical records.  Initial Assessment:   Darci Vaughan is a 20 yo M  with PMH of spontaneous PTX presents with 5 minutes of left sided chest pain, sinus tachycardia on EKG and normal pulmonary slide sign on bedside US.  Differential Diagnosis:   Spontaneous PTX, ACS, PNA, pericarditis, costochondritis, panic disorder. ACS highly unlikely given patient's risk profile, no Dm, no family cardiac hx, atypical Cp. Breath sounds are equal bilaterally with no WRR, making PNA, PTX less likely. Beside US with normal slide sign makes PTX less likely. The story is consistent with panic disorder and some sx's similar to costochondritis such as pain with lifting heavy objects at work.  Independently Interpreted Test(s):   I have ordered and independently interpreted EKG Reading(s) - see prior notes  Clinical Tests:   Medical Tests: Ordered and Reviewed  ED Management:  EKG sinus tachy. Ibuprofen 600 mg given. Dispo home with PCP follow up.            Scribe Attestation:   Scribe #1: I performed the above scribed service and the documentation accurately describes the services I performed. I attest to the accuracy of the note.    Attending Attestation:   Physician Attestation Statement for Resident:  As the supervising MD   Physician Attestation Statement: I have personally seen and examined this patient.   I agree with the above history. -:   As the supervising MD I agree with the above PE.    As the supervising MD I agree with the above treatment, course, plan, and disposition.          Physician Attestation for Scribe:  Physician Attestation Statement for Scribe #1: I, Dr. High, reviewed documentation, as scribed by Luis Zhao in my presence, and it is both accurate and complete.                 ED Course     Clinical Impression:   The encounter diagnosis was Palpitations.    Disposition:   Disposition: Discharged  Condition: Stable       Cipriano High MD  06/12/17 7620

## 2017-06-12 ENCOUNTER — OFFICE VISIT (OUTPATIENT)
Dept: INTERNAL MEDICINE | Facility: CLINIC | Age: 22
End: 2017-06-12
Payer: COMMERCIAL

## 2017-06-12 VITALS
HEART RATE: 100 BPM | BODY MASS INDEX: 19.28 KG/M2 | WEIGHT: 145.5 LBS | HEIGHT: 73 IN | TEMPERATURE: 99 F | SYSTOLIC BLOOD PRESSURE: 110 MMHG | DIASTOLIC BLOOD PRESSURE: 76 MMHG | OXYGEN SATURATION: 99 %

## 2017-06-12 DIAGNOSIS — F32.A DEPRESSION, UNSPECIFIED DEPRESSION TYPE: ICD-10-CM

## 2017-06-12 DIAGNOSIS — G89.29 CHRONIC NONINTRACTABLE HEADACHE, UNSPECIFIED HEADACHE TYPE: Primary | ICD-10-CM

## 2017-06-12 DIAGNOSIS — F41.0 PANIC ATTACKS: ICD-10-CM

## 2017-06-12 DIAGNOSIS — F41.9 ANXIETY: ICD-10-CM

## 2017-06-12 DIAGNOSIS — R51.9 CHRONIC NONINTRACTABLE HEADACHE, UNSPECIFIED HEADACHE TYPE: Primary | ICD-10-CM

## 2017-06-12 PROCEDURE — 99999 PR PBB SHADOW E&M-EST. PATIENT-LVL V: CPT | Mod: PBBFAC,,, | Performed by: INTERNAL MEDICINE

## 2017-06-12 PROCEDURE — 99214 OFFICE O/P EST MOD 30 MIN: CPT | Mod: 25,S$GLB,, | Performed by: INTERNAL MEDICINE

## 2017-06-12 PROCEDURE — 96372 THER/PROPH/DIAG INJ SC/IM: CPT | Mod: S$GLB,,, | Performed by: INTERNAL MEDICINE

## 2017-06-12 RX ORDER — BUTALBITAL, ACETAMINOPHEN AND CAFFEINE 50; 325; 40 MG/1; MG/1; MG/1
1 TABLET ORAL EVERY 4 HOURS PRN
Qty: 30 TABLET | Refills: 0 | Status: SHIPPED | OUTPATIENT
Start: 2017-06-12 | End: 2017-07-12

## 2017-06-12 RX ORDER — KETOROLAC TROMETHAMINE 30 MG/ML
60 INJECTION, SOLUTION INTRAMUSCULAR; INTRAVENOUS
Status: COMPLETED | OUTPATIENT
Start: 2017-06-12 | End: 2017-06-12

## 2017-06-12 RX ADMIN — KETOROLAC TROMETHAMINE 60 MG: 30 INJECTION, SOLUTION INTRAMUSCULAR; INTRAVENOUS at 02:06

## 2017-06-12 NOTE — PROGRESS NOTES
Subjective:       Patient ID: Darci Vaughan Jr. is a 21 y.o. male.    Chief Complaint: Headache (constantly x 2 wks )    Patient is thin 21 year old who works at [email or web address]. He complains of right parietal headache all the time, not responsive to tylenol. No nausea, vomiting.  Admits to anxiety and occasional panic attacks.  Mom says he never leaves the house, has no friends and plays computer games all the time      Headache    This is a new problem. The current episode started 1 to 4 weeks ago. The problem occurs constantly. The problem has been unchanged. The pain is located in the right unilateral and parietal region. The pain does not radiate. The pain quality is not similar to prior headaches. The quality of the pain is described as aching and sharp. The pain is at a severity of 4/10. The pain is moderate. Pertinent negatives include no abdominal pain, abnormal behavior, anorexia, back pain, blurred vision, coughing, dizziness, drainage, ear pain, eye pain, eye redness, eye watering, facial sweating, fever, hearing loss, insomnia, loss of balance, muscle aches, nausea, neck pain, numbness, phonophobia, photophobia, rhinorrhea, scalp tenderness, seizures, sinus pressure, sore throat, swollen glands, tingling, tinnitus, visual change, vomiting, weakness or weight loss. The symptoms are aggravated by emotional stress. He has tried nothing for the symptoms. There is no history of cancer, cluster headaches, hypertension, immunosuppression, migraine headaches, migraines in the family, obesity, pseudotumor cerebri, recent head traumas, sinus disease or TMJ.     Review of Systems   Constitutional: Negative for activity change, appetite change, fever and weight loss.   HENT: Negative for congestion, ear pain, hearing loss, postnasal drip, rhinorrhea, sinus pressure, sore throat and tinnitus.    Eyes: Negative for blurred vision, photophobia, pain and redness.   Respiratory: Negative for cough, shortness of  breath and wheezing.    Cardiovascular: Negative for chest pain and palpitations.   Gastrointestinal: Negative for abdominal pain, anorexia, blood in stool, constipation, diarrhea, nausea and vomiting.   Genitourinary: Negative for decreased urine volume, difficulty urinating, flank pain and frequency.   Musculoskeletal: Negative for arthralgias, back pain and neck pain.   Neurological: Positive for headaches. Negative for dizziness, tingling, seizures, weakness, numbness and loss of balance.   Psychiatric/Behavioral: The patient does not have insomnia.        Objective:      Physical Exam   Constitutional: He is oriented to person, place, and time. He appears well-developed and well-nourished. No distress.   HENT:   Head: Normocephalic and atraumatic.   Right Ear: External ear normal.   Left Ear: External ear normal.   Eyes: Conjunctivae and EOM are normal. Pupils are equal, round, and reactive to light.   Neck: Normal range of motion. Neck supple. No thyromegaly present.   Cardiovascular: Normal rate and regular rhythm.    Pulmonary/Chest: Effort normal and breath sounds normal.   Abdominal: Soft. Bowel sounds are normal. He exhibits no mass. There is no tenderness. There is no rebound and no guarding.   Musculoskeletal: Normal range of motion.   Lymphadenopathy:     He has no cervical adenopathy.   Neurological: He is alert and oriented to person, place, and time. He has normal reflexes. He displays normal reflexes. No cranial nerve deficit. He exhibits normal muscle tone. Coordination normal.   Skin: Skin is warm and dry.   Psychiatric: His speech is normal. Thought content normal. His mood appears anxious. He is withdrawn. He is not actively hallucinating. Thought content is not delusional. He exhibits a depressed mood. He expresses no homicidal and no suicidal ideation. He expresses no suicidal plans and no homicidal plans.   When questioned with mom out of room, patient reports he is afraid he has a cancer  that is cousing his headache.  He treats his anxiety/panic attacks at work by taking a break and going outside. He is attentive.       Assessment:       1. Chronic nonintractable headache, unspecified headache type    2. Depression, unspecified depression type    3. Anxiety    4. Panic attacks        Plan:   Darci was seen today for headache.    Diagnoses and all orders for this visit:    Chronic nonintractable headache, unspecified headache type  -     CT Head Without Contrast; Future  -     Ambulatory consult to Neurology    Depression, unspecified depression type  -     Ambulatory Consult to Child/Adolescent Psychology    Anxiety  -     Ambulatory Consult to Child/Adolescent Psychology    Panic attacks  -     Ambulatory Consult to Child/Adolescent Psychology    Other orders  -     ketorolac injection 60 mg; Inject 2 mLs (60 mg total) into the muscle one time.  -     butalbital-acetaminophen-caffeine -40 mg (FIORICET, ESGIC) -40 mg per tablet; Take 1 tablet by mouth every 4 (four) hours as needed for Pain.

## 2017-06-14 ENCOUNTER — HOSPITAL ENCOUNTER (OUTPATIENT)
Dept: RADIOLOGY | Facility: HOSPITAL | Age: 22
Discharge: HOME | End: 2017-06-14
Attending: INTERNAL MEDICINE
Payer: COMMERCIAL

## 2017-06-14 DIAGNOSIS — R51.9 CHRONIC NONINTRACTABLE HEADACHE, UNSPECIFIED HEADACHE TYPE: ICD-10-CM

## 2017-06-14 DIAGNOSIS — G89.29 CHRONIC NONINTRACTABLE HEADACHE, UNSPECIFIED HEADACHE TYPE: ICD-10-CM

## 2017-06-14 PROCEDURE — 70450 CT HEAD/BRAIN W/O DYE: CPT | Mod: TC

## 2017-06-14 PROCEDURE — 70450 CT HEAD/BRAIN W/O DYE: CPT | Mod: 26,,, | Performed by: RADIOLOGY

## 2017-06-16 ENCOUNTER — OFFICE VISIT (OUTPATIENT)
Dept: NEUROLOGY | Facility: CLINIC | Age: 22
End: 2017-06-16
Payer: COMMERCIAL

## 2017-06-16 VITALS
DIASTOLIC BLOOD PRESSURE: 75 MMHG | SYSTOLIC BLOOD PRESSURE: 120 MMHG | HEART RATE: 79 BPM | HEIGHT: 73 IN | BODY MASS INDEX: 19.75 KG/M2 | WEIGHT: 149.06 LBS

## 2017-06-16 DIAGNOSIS — R51.9 HEADACHE, UNSPECIFIED HEADACHE TYPE: Primary | ICD-10-CM

## 2017-06-16 PROCEDURE — 99999 PR PBB SHADOW E&M-EST. PATIENT-LVL III: CPT | Mod: PBBFAC,,, | Performed by: PSYCHIATRY & NEUROLOGY

## 2017-06-16 PROCEDURE — 99204 OFFICE O/P NEW MOD 45 MIN: CPT | Mod: S$GLB,,, | Performed by: PSYCHIATRY & NEUROLOGY

## 2017-06-16 NOTE — LETTER
June 16, 2017      Magnolia Lawrence MD  [address]  [place] [address]           [facility]  [address]  [facility] [address]  Phone: [phone]          Patient: Darci Vaughan Jr.   MR Number: [id number]   YOB: 1995   Date of Visit: 6/16/2017       Dear Dr. Magnolia Lawrence:    Thank you for referring Darci Vaughan to me for evaluation. Attached you will find relevant portions of my assessment and plan of care.    If you have questions, please do not hesitate to call me. I look forward to following Darci Vaughan along with you.    Sincerely,    Aydin Parham MD    Enclosure  CC:  No Recipients    If you would like to receive this communication electronically, please contact [email or web address] or [phone] to request more information on "[employer]" Link access.    For providers and/or their staff who would like to refer a patient to [facility], please contact us through our one-stop-shop provider referral line, [facility], at [phone].    If you feel you have received this communication in error or would no longer like to receive these types of communications, please e-mail [email or web address]

## 2017-06-16 NOTE — PROGRESS NOTES
"  Darci Vaughan Jr. is a 21 y.o. year old male that  presents with complains of worsening HA. He is accompany by his grandmother.    HPI:  Mr Vaughan is a 22 y/o right handed who reports a long standing history of sporadic HA that for unclear reasons had gotten much more frequently within the past few weeks.  He said that he gets daily " hot pressure type pain in different spots in my head", mainly top of the head. That particular type of pain usually last 10-15 minutes, varies from mild to severe intensity but is never an incapacitating HA associated with nausea, vomiting, visual changes, slurred speech, confusion, imbalance, or language impairment. There " little bit of dizziness, sensitivity to light but no phonophobia or osmophobia. The head pain is not exacerbated by movements or postural changes and there not apparent provoking factors.  Patient mentions an episode in which the pain was associated with right sided " heaviness/weakness" that allegedly lasted for couple of hours.  Stated that he took naprosyn but did not help, and just few days ago he was prescribed Fioricet which he is taking 3 times a day.  His symptoms prompted a CT head without contrast that I personally reviewed and showed no acute abnormality.  Denies recent fever, infection, head or neck trauma. Additionally, denies ETOH or illicit drug use.      Past Medical History:   Diagnosis Date    Atelectasis     Spontaneous pneumothorax 10/2012     Social History     Social History    Marital status: Single     Spouse name: N/A    Number of children: N/A    Years of education: N/A     Occupational History    Not on file.     Social History Main Topics    Smoking status: Passive Smoke Exposure - Never Smoker    Smokeless tobacco: Not on file    Alcohol use No    Drug use: No    Sexual activity: No     Other Topics Concern    Not on file     Social History Narrative    Lives at home with mom, mom's boyfriend, and 2 sisters. 1 dog. " "Mom's boyfriend smokes outside. Attends [employer] 11th grade. Plays basketball.     Past Surgical History:   Procedure Laterality Date    BLEBECTOMY  Dec 2012    for recurrent spontaneous L pneumothorax    CHEST TUBE INSERTION       Family History   Problem Relation Age of Onset    Cancer Maternal Grandmother      breast    Emphysema Maternal Grandfather     Seizures Sister     Allergies Sister            Review of Systems  General ROS: negative for chills, fever or weight loss  Psychological ROS: negative for hallucination, depression or suicidal ideation  Ophthalmic ROS: negative for blurry vision, photophobia or eye pain  ENT ROS: negative for epistaxis, sore throat or rhinorrhea  Respiratory ROS: no cough, shortness of breath, or wheezing  Cardiovascular ROS: no chest pain or dyspnea on exertion  Gastrointestinal ROS: no abdominal pain, change in bowel habits, or black/ bloody stools  Genito-Urinary ROS: no dysuria, trouble voiding, or hematuria  Musculoskeletal ROS: negative for gait disturbance or muscular weakness  Neurological ROS: no syncope or seizures; no ataxia  Dermatological ROS: negative for pruritis, rash and jaundice      Physical Exam:  /75   Pulse 79   Ht 6' 1" (1.854 m)   Wt 67.6 kg (149 lb 0.5 oz)   BMI 19.66 kg/m²   General appearance: alert, cooperative, no distress  Constitutional:Oriented to person, place, and time.appears well-developed and well-nourished.   HEENT: Normocephalic, atraumatic, neck symmetrical, no nasal discharge   Eyes: conjunctivae/corneas clear, PERRL, EOM's intact  Lungs: clear to auscultation bilaterally, no dullness to percussion bilaterally  Heart: regular rate and rhythm without rub; no displacement of the PMI   Abdomen: soft, non-tender; bowel sounds normoactive; no organomegaly  Extremities: extremities symmetric; no clubbing, cyanosis, or edema  Integument: Skin color, texture, turgor normal; no rashes; hair distrubution normal  Neurologic:   Mental " "status: alert and awake, oriented x 4, comprehension, naming, and repetition intact. No right to left confusion. Performs serial 7's without difficulty .No dysarthria.  CN 2-12: pupils 4 mm bilaterally, reactive to light. Fundi without papilledema. Visual fields full to confrontation. EOM full without nystagmus. Face sensation normal in all distributions. Face symmetric. Hearing grossly intact. Palate elevates well. Tongue midline without atrophy or fasciculations.  Motor: 5/5 all over  Sensory: intact in all modalities.  DTR's: 2+ all over.  Plantars: no tested.  Coordination: finger to nose and heel-knee-shin intact.  Gait: no ataxia or bradykinesia  Psychiatric: no pressured speech; normal affect; no evidence of impaired cognition     LABS:    Complete Blood Count  Lab Results   Component Value Date    RBC 5.47 05/26/2017    HGB 15.2 05/26/2017    HCT 47.1 05/26/2017    MCV 86 05/26/2017    MCH 27.8 05/26/2017    MCHC 32.3 05/26/2017    RDW 14.1 05/26/2017     05/26/2017    MPV 9.7 05/26/2017    GRAN 4.8 05/26/2017    GRAN 62.9 05/26/2017    LYMPH 1.8 05/26/2017    LYMPH 23.6 05/26/2017    MONO 0.9 05/26/2017    MONO 12.1 05/26/2017    EOS 0.1 05/26/2017    BASO 0.03 05/26/2017    EOSINOPHIL 0.9 05/26/2017    BASOPHIL 0.4 05/26/2017    DIFFMETHOD Automated 05/26/2017       Comprehensive Metabolic Panel  Lab Results   Component Value Date    GLU 85 05/26/2017    BUN 11 05/26/2017    CREATININE 1.0 05/26/2017     05/26/2017    K 4.3 05/26/2017     05/26/2017    PROT 7.6 05/26/2017    ALBUMIN 4.3 05/26/2017    BILITOT 0.5 05/26/2017    AST 17 05/26/2017    ALKPHOS 80 05/26/2017    CO2 31 (H) 05/26/2017    ALT 14 05/26/2017    ANIONGAP 6 (L) 05/26/2017    EGFRNONAA >60.0 05/26/2017    ESTGFRAFRICA >60.0 05/26/2017       TSH  No results found for: TSH      Assessment and Plan:  20 y/o with complains of daily headache " like a pressure hot pain" with a pattern described above. Normal neuro-exam and " unremarkable CT head. Started on Fioricet few days ago.  Although he related a HA accompanied by prolonged weakness/heaviness right side, I doubt a secondary cause of HA.  Getting MRI brain would be prudent but patient said that he would like to see speak to his mother before he makes a decision about this.  Keep a HA calendar, may need headache preventive therapy.  Discouraged excessive use of Fioricet due to habit forming potential and concern for the development of medication overuse HA.  Patient said that he will call the office in the following days.  No diagnosis found.  There were no encounter diagnoses.      No orders of the defined types were placed in this encounter.          Aydin Parham MD

## 2017-06-20 ENCOUNTER — NURSE TRIAGE (OUTPATIENT)
Dept: ADMINISTRATIVE | Facility: CLINIC | Age: 22
End: 2017-06-20

## 2017-06-20 NOTE — TELEPHONE ENCOUNTER
Pt c/o tingling and numbness to left side of his head x 1 hour with left eye discomfort. 6/10 pain to left eye.  Pt Also c/o of a HA. Pt denies trouble with extremity weakness, denies blurred vision. Denies any recent trauma. Pt does not have an established PCP at this time other than a physical scheduled in July.     Reason for Disposition   Severe pain in one eye    Protocols used: ST HEADACHE-A-OH

## 2017-06-26 DIAGNOSIS — R51.9 NONINTRACTABLE HEADACHE, UNSPECIFIED CHRONICITY PATTERN, UNSPECIFIED HEADACHE TYPE: Primary | ICD-10-CM

## 2017-06-26 DIAGNOSIS — R20.2 NUMBNESS AND TINGLING OF RIGHT ARM AND LEG: ICD-10-CM

## 2017-06-26 DIAGNOSIS — R20.0 NUMBNESS AND TINGLING OF RIGHT ARM AND LEG: ICD-10-CM

## 2017-07-04 ENCOUNTER — NURSE TRIAGE (OUTPATIENT)
Dept: ADMINISTRATIVE | Facility: CLINIC | Age: 22
End: 2017-07-04

## 2017-07-05 ENCOUNTER — TELEPHONE (OUTPATIENT)
Dept: INTERNAL MEDICINE | Facility: CLINIC | Age: 22
End: 2017-07-05

## 2017-07-05 NOTE — TELEPHONE ENCOUNTER
Reason for Disposition   [1] MILD chest pain (doesn't interfere with normal activities) AND [2] unexplained (Exception: transient pain, heartburn, pain due to coughing or sore muscles)    Protocols used: ST CHEST PAIN-P-AH    Pt c/o left sided chest pain for 2 days. Today it started after drinking a cold drink. States he does not know if it is reflux. Care advice given.

## 2017-07-05 NOTE — TELEPHONE ENCOUNTER
----- Message from Catrachito Waldrop sent at 7/5/2017  1:21 PM CDT -----  Contact: Saida/ [facility]/ [phone] ph/ [phone] fax  Pt's insurance is requiring them to hve the MRI done at the company listed above and they would like the orders faxed over before his appt on Friday, the 7th.  Please call and advise.    Thank you

## 2017-07-06 ENCOUNTER — HOSPITAL ENCOUNTER (EMERGENCY)
Facility: HOSPITAL | Age: 22
Discharge: HOME | End: 2017-07-07
Attending: EMERGENCY MEDICINE
Payer: COMMERCIAL

## 2017-07-06 DIAGNOSIS — R07.89 CHEST TIGHTNESS: ICD-10-CM

## 2017-07-06 DIAGNOSIS — R07.9 CHEST PAIN, UNSPECIFIED TYPE: ICD-10-CM

## 2017-07-06 DIAGNOSIS — R00.2 PALPITATIONS: Primary | ICD-10-CM

## 2017-07-06 PROCEDURE — 93010 ELECTROCARDIOGRAM REPORT: CPT | Mod: ,,, | Performed by: INTERNAL MEDICINE

## 2017-07-06 PROCEDURE — 99284 EMERGENCY DEPT VISIT MOD MDM: CPT | Mod: ,,, | Performed by: EMERGENCY MEDICINE

## 2017-07-06 PROCEDURE — 99284 EMERGENCY DEPT VISIT MOD MDM: CPT | Mod: 25

## 2017-07-06 PROCEDURE — 93005 ELECTROCARDIOGRAM TRACING: CPT

## 2017-07-07 ENCOUNTER — OFFICE VISIT (OUTPATIENT)
Dept: INTERNAL MEDICINE | Facility: CLINIC | Age: 22
End: 2017-07-07
Payer: COMMERCIAL

## 2017-07-07 VITALS
RESPIRATION RATE: 18 BRPM | WEIGHT: 150 LBS | DIASTOLIC BLOOD PRESSURE: 76 MMHG | BODY MASS INDEX: 19.88 KG/M2 | TEMPERATURE: 99 F | HEIGHT: 73 IN | SYSTOLIC BLOOD PRESSURE: 120 MMHG | HEART RATE: 98 BPM | OXYGEN SATURATION: 96 %

## 2017-07-07 VITALS
DIASTOLIC BLOOD PRESSURE: 62 MMHG | SYSTOLIC BLOOD PRESSURE: 104 MMHG | OXYGEN SATURATION: 96 % | BODY MASS INDEX: 18.82 KG/M2 | HEART RATE: 70 BPM | WEIGHT: 142 LBS | HEIGHT: 73 IN

## 2017-07-07 DIAGNOSIS — Z00.00 ANNUAL PHYSICAL EXAM: Primary | ICD-10-CM

## 2017-07-07 DIAGNOSIS — R05.9 COUGH: ICD-10-CM

## 2017-07-07 DIAGNOSIS — R29.91 MARFANOID HABITUS: ICD-10-CM

## 2017-07-07 DIAGNOSIS — Z87.09 HX OF PNEUMOTHORAX: ICD-10-CM

## 2017-07-07 DIAGNOSIS — R00.2 PALPITATIONS: ICD-10-CM

## 2017-07-07 DIAGNOSIS — F41.9 ANXIETY: ICD-10-CM

## 2017-07-07 DIAGNOSIS — R06.2 WHEEZING: ICD-10-CM

## 2017-07-07 DIAGNOSIS — R51.9 FREQUENT HEADACHES: ICD-10-CM

## 2017-07-07 DIAGNOSIS — R42 DIZZINESS: Primary | ICD-10-CM

## 2017-07-07 LAB
ALBUMIN SERPL BCP-MCNC: 4.6 G/DL
ALP SERPL-CCNC: 87 U/L
ALT SERPL W/O P-5'-P-CCNC: 18 U/L
ANION GAP SERPL CALC-SCNC: 13 MMOL/L
AST SERPL-CCNC: 17 U/L
BASOPHILS # BLD AUTO: 0.05 K/UL
BASOPHILS NFR BLD: 0.6 %
BILIRUB SERPL-MCNC: 0.5 MG/DL
BUN SERPL-MCNC: 19 MG/DL
CALCIUM SERPL-MCNC: 10 MG/DL
CHLORIDE SERPL-SCNC: 101 MMOL/L
CO2 SERPL-SCNC: 24 MMOL/L
CREAT SERPL-MCNC: 1 MG/DL
DIFFERENTIAL METHOD: ABNORMAL
EOSINOPHIL # BLD AUTO: 0 K/UL
EOSINOPHIL NFR BLD: 0.5 %
ERYTHROCYTE [DISTWIDTH] IN BLOOD BY AUTOMATED COUNT: 13.5 %
EST. GFR  (AFRICAN AMERICAN): >60 ML/MIN/1.73 M^2
EST. GFR  (NON AFRICAN AMERICAN): >60 ML/MIN/1.73 M^2
GLUCOSE SERPL-MCNC: 77 MG/DL
HCT VFR BLD AUTO: 48.3 %
HGB BLD-MCNC: 16.6 G/DL
LYMPHOCYTES # BLD AUTO: 1.6 K/UL
LYMPHOCYTES NFR BLD: 18.8 %
MAGNESIUM SERPL-MCNC: 2 MG/DL
MCH RBC QN AUTO: 28.7 PG
MCHC RBC AUTO-ENTMCNC: 34.4 %
MCV RBC AUTO: 84 FL
MONOCYTES # BLD AUTO: 0.7 K/UL
MONOCYTES NFR BLD: 8.3 %
NEUTROPHILS # BLD AUTO: 5.9 K/UL
NEUTROPHILS NFR BLD: 71.7 %
PHOSPHATE SERPL-MCNC: 3.3 MG/DL
PLATELET # BLD AUTO: 255 K/UL
PMV BLD AUTO: 8.9 FL
POTASSIUM SERPL-SCNC: 4.1 MMOL/L
PROT SERPL-MCNC: 8.2 G/DL
RBC # BLD AUTO: 5.78 M/UL
SODIUM SERPL-SCNC: 138 MMOL/L
TROPONIN I SERPL DL<=0.01 NG/ML-MCNC: <0.006 NG/ML
TSH SERPL DL<=0.005 MIU/L-ACNC: 0.77 UIU/ML
WBC # BLD AUTO: 8.29 K/UL

## 2017-07-07 PROCEDURE — 99395 PREV VISIT EST AGE 18-39: CPT | Mod: S$GLB,,, | Performed by: INTERNAL MEDICINE

## 2017-07-07 PROCEDURE — 94761 N-INVAS EAR/PLS OXIMETRY MLT: CPT

## 2017-07-07 PROCEDURE — 80053 COMPREHEN METABOLIC PANEL: CPT

## 2017-07-07 PROCEDURE — 84100 ASSAY OF PHOSPHORUS: CPT

## 2017-07-07 PROCEDURE — 93010 ELECTROCARDIOGRAM REPORT: CPT | Mod: ,,, | Performed by: INTERNAL MEDICINE

## 2017-07-07 PROCEDURE — 99999 PR PBB SHADOW E&M-EST. PATIENT-LVL IV: CPT | Mod: PBBFAC,,, | Performed by: INTERNAL MEDICINE

## 2017-07-07 PROCEDURE — 84484 ASSAY OF TROPONIN QUANT: CPT

## 2017-07-07 PROCEDURE — 83735 ASSAY OF MAGNESIUM: CPT

## 2017-07-07 PROCEDURE — 85025 COMPLETE CBC W/AUTO DIFF WBC: CPT

## 2017-07-07 PROCEDURE — 84443 ASSAY THYROID STIM HORMONE: CPT

## 2017-07-07 RX ORDER — METHOCARBAMOL 750 MG/1
TABLET, FILM COATED ORAL
Refills: 0 | COMMUNITY
Start: 2017-06-28 | End: 2017-07-08

## 2017-07-07 RX ORDER — HYDROXYZINE PAMOATE 50 MG/1
CAPSULE ORAL
Refills: 0 | COMMUNITY
Start: 2017-06-20 | End: 2017-07-08

## 2017-07-07 RX ORDER — ETODOLAC 400 MG/1
400 TABLET, FILM COATED ORAL 2 TIMES DAILY
Refills: 0 | COMMUNITY
Start: 2017-06-20 | End: 2017-07-08

## 2017-07-07 NOTE — PROGRESS NOTES
"Subjective:       Patient ID: Darci Vaughan Jr. is a 21 y.o. male.    Chief Complaint: Annual Exam   this is a 21-year-old who presents today for physical.  He is brought in today with his mother smith.  She reports patient has been in fairly good health although he has had problems in the past with spontaneous pneumothorax when he was younger he has not had episode quite some time he has been having more trouble recently with intermittent chest pain and reason wheezing on occasion he has occasional cough and palpitations patient reports an episode yesterday when to the ER for evaluation chest x-ray showed no pneumothorax.  Mother wonders if he may have asthma.  He is here to establish she has been having trouble with headaches intermittently has undergone imaging and had appointment with neurology he is planning on having an MRI in the future for further evaluation he has had more persistent headaches .  He is not working currently because he kept: Off work because he was getting headaches .  Patient reports that to be on top of the head on the sides intermittently .  His occasional fatigue.  He denies drug use no fever or chills     HPI  Review of Systems   Respiratory: Positive for shortness of breath and wheezing.         Occasional cough/wheezing    Cardiovascular: Positive for chest pain and palpitations.   Musculoskeletal: Negative for arthralgias, back pain, joint swelling and myalgias.   Neurological: Positive for dizziness and headaches. Negative for light-headedness and numbness.   Psychiatric/Behavioral:        Anxiety at times        Objective:     Blood pressure 104/62, pulse 70, height 6' 1" (1.854 m), weight 64.4 kg (141 lb 15.6 oz), SpO2 96 %.    Physical Exam   Constitutional: No distress.   Thin   Long fingers    HENT:   Head: Normocephalic.   Mouth/Throat: Oropharynx is clear and moist.   Eyes: No scleral icterus.   Neck: Neck supple.   Cardiovascular: Normal rate, regular rhythm and normal " heart sounds.  Exam reveals no gallop and no friction rub.    No murmur heard.  Pulmonary/Chest: Effort normal and breath sounds normal. No respiratory distress.   Rare squeak expir    Abdominal: Soft. Bowel sounds are normal. He exhibits no mass. There is no tenderness.   Musculoskeletal: He exhibits no edema.   Tall thin with long finger  Possible marfanoid    Neurological: He is alert. No cranial nerve deficit. Coordination normal.   Skin: No erythema.   Psychiatric:   Anxious at times   Vitals reviewed.      Assessment:       1. Annual physical exam    2. Anxiety    3. Palpitations    4. Wheezing    5. Marfanoid habitus    6. Cough    7. Hx of pneumothorax    8. Frequent headaches        Plan:       Darci was seen today for annual exam.    Diagnoses and all orders for this visit:    Annual physical exam  -     TSH; Future  -     Lipid panel; Future    Anxiety  Patient reports some anxiety he has graduated from school and has some transition issues  At times he may be agreeable to consider   -     Ambulatory consult to Psychology    Palpitations intermittant tachycardia   Marfanoid habitus evaluate for possible marfans contributing   -     Ambulatory [facility]  For further evaluation     Cough  intermittant wheezing   Hx of pneumothorax  Pulmonary evaluation   He will seek attention if progressive sob cp or increase concern prior   -     Ambulatory consult to Pulmonology    Headaches he is undergoing further evaluation with neurology   Mri has been scheduled     Follow up 1-2 month recheck   Recent labs and xray reviewed

## 2017-07-07 NOTE — ED PROVIDER NOTES
"Encounter Date: 7/6/2017       History     Chief Complaint   Patient presents with    Palpitations     22 yo M with history of spontaneous L sided PTX presents with chest tightness and palpitations. Symptoms started 4 days ago, feels his heart is racing, chest tightness to the left side of his chest that is non radiating, constant x 4 days, not associated with nausea, vomiting, light headedness, diaphoresis. Tightness worse with movement. When asked if he is short of breath he states "sometimes." He has not had any fever, cough. He has been evaluated before for chest tightness and palpitations. He reports a history of anxiety but has not been treated for anxiety in the past. He was seen in this department last month for similar symptoms. He does not recall the name of his PMD but states he has an appointment tomorrow. He currently endorses chest tightness and palpitations.           Review of patient's allergies indicates:  No Known Allergies  Past Medical History:   Diagnosis Date    Anxiety     Atelectasis     Spontaneous pneumothorax 10/2012     Past Surgical History:   Procedure Laterality Date    BLEBECTOMY  Dec 2012    for recurrent spontaneous L pneumothorax    CHEST TUBE INSERTION       Family History   Problem Relation Age of Onset    Cancer Maternal Grandmother      breast    Emphysema Maternal Grandfather     Seizures Sister     Allergies Sister      Social History   Substance Use Topics    Smoking status: Passive Smoke Exposure - Never Smoker    Smokeless tobacco: Former User    Alcohol use No     Review of Systems   Constitutional: Positive for chills. Negative for fever.   HENT: Negative for facial swelling.    Eyes: Negative for visual disturbance.   Respiratory: Positive for chest tightness and shortness of breath. Negative for cough.    Cardiovascular: Positive for palpitations. Negative for chest pain and leg swelling.   Gastrointestinal: Negative for abdominal pain, nausea and " vomiting.   Genitourinary: Negative for dysuria.   Musculoskeletal: Negative for arthralgias.   Skin: Negative for rash.   Allergic/Immunologic: Negative for immunocompromised state.   Neurological: Negative for light-headedness.   Psychiatric/Behavioral: The patient is nervous/anxious.        Physical Exam     Initial Vitals [07/06/17 2257]   BP Pulse Resp Temp SpO2   (!) 150/90 (!) 120 14 99.1 °F (37.3 °C) 99 %      MAP       110         Physical Exam    Nursing note and vitals reviewed.  Constitutional: He appears well-developed and well-nourished. He is not diaphoretic. No distress.   HENT:   Mouth/Throat: Oropharynx is clear and moist.   Eyes: Pupils are equal, round, and reactive to light.   Neck: Neck supple.   Cardiovascular: Normal rate and regular rhythm.   Pulses:       Radial pulses are 2+ on the right side, and 2+ on the left side.   Pulmonary/Chest: Breath sounds normal. No respiratory distress.   Breath sounds heard bilaterally, no tachypnea   Abdominal: Soft. Bowel sounds are normal. There is no tenderness.   Musculoskeletal: He exhibits no edema or tenderness.   Neurological: He is alert and oriented to person, place, and time.   No facial droop, no slurred speech, moves all 4 extremities   Skin: Skin is warm and dry.   Psychiatric: His speech is normal. His mood appears anxious. His affect is blunt.         ED Course   Procedures  Labs Reviewed   CBC W/ AUTO DIFFERENTIAL - Abnormal; Notable for the following:        Result Value    MPV 8.9 (*)     All other components within normal limits   COMPREHENSIVE METABOLIC PANEL   TROPONIN I   TSH   MAGNESIUM   PHOSPHORUS          X-Rays:   Independently Interpreted Readings:   Other Readings:  Cxr:  No acute infiltrate, consolidation or effusion.            APC / Resident Notes:   20 yo M presents with chest tightness and palpitations. Ddx PTX v. Thyroid abnormality v. Electrolyte disturbance v. Dysrhythmia v. Anxiety reaction, work up initiated with labs,  ECG, CXR.  Pt low risk Wells score, I have low clinical suspicion for PE- although ECG with RBBB and right atrial enlargement, does not appear changed from previous ECG and can be seen in ECGs dating back several years, he is not tachypneic, hypoxic, does not appear short of breath, therefore will not pursue further diagnostic studies for PE.   Malissa Rodriguez MD PGY4  [employer]  1:13 AM      Update:  HR improved without intervention. Labs within acceptable limits. CXR negative for acute finding, no PTX. Pt currently without chest tightness. Has follow up with his PMD tomorrow (he does not recall her name). Reviewed lab and imaging results with patient, advised use of NSAIds for chest tightness, return to ER if any new or worsening symptoms.   Malissa Rodriguez MD PGY4  [employer] [facility]  2:15 AM             Attending Attestation:   Physician Attestation Statement for Resident:  As the supervising MD   Physician Attestation Statement: I have personally seen and examined this patient.   I agree with the above history. -: Multiple similar episodes in past, worried about another collapsed lung  No fevers  sxs occur intermittently, no clear exertional component  Feels well o/w   As the supervising MD I agree with the above PE.   -: Nad, wdwn  ctab  Rrr, nl s1/2  Chest tender anterior bilat  No crepitus  Symmetric bs  abd soft, ntnd, no edema   As the supervising MD I agree with the above treatment, course, plan, and disposition.  I have reviewed and agree with the residents interpretation of the following: lab data, x-rays and EKG.                    ED Course     Clinical Impression:   The primary encounter diagnosis was Palpitations. Diagnoses of Chest tightness and Chest pain, unspecified type were also pertinent to this visit.                           Malissa Rodriguez MD  Resident  07/07/17 0215       Chance Zapata MD  07/09/17 0129

## 2017-07-07 NOTE — ED TRIAGE NOTES
Pt reports stabbing chest pain and palpations x4 days. Pt reports SOB at rest. Pt also reports numbness and tingling to generalized areas of the body.Pt reports hx of anxiety.

## 2017-07-07 NOTE — PROVIDER PROGRESS NOTES - EMERGENCY DEPT.
Encounter Date: 7/6/2017    ED Physician Progress Notes       SCRIBE NOTE: I, Deb Teague, am scribing for, and in the presence of,  Dr. Ogden.  Physician Statement: I, Dr. Ogden, personally performed the services described in this documentation as scribed by Deb Teague in my presence, and it is both accurate and complete.      EKG - STEMI Decision  Initial Reading: No STEMI present.

## 2017-07-07 NOTE — ED NOTES
Two patient identifiers checked and confirmed.    APPEARANCE: Resting comfortably in no acute distress. Patient has clean hair, skin and nails. Clothing is appropriate and properly fastened.  NEURO: Awake, alert, appropriate for age, and cooperative with a calm affect; pupils equal and round.  HEENT: Head symmetrical.   CARDIAC: Pt tachy on monitor with regular rhythm. Pt reports palpations, stabbing chest pain, and SOB at rest x4 days.  RESPIRATORY: Airway is open and patent. Respirations are spontaneous on room air. Normal respiratory effort and rate noted.  GI/: Abdomen soft and non-distended. Patient is reported to void and stool appropriately for age.  NEUROVASCULAR: All extremities are warm and pink with +2 pulses and capillary refill less than 3 seconds.  MUSCULOSKELETAL: Moves all extremities well; no obvious deformities noted.  SKIN: Warm and dry, adequate turgor, mucus membranes moist and pink

## 2017-07-08 ENCOUNTER — LAB VISIT (OUTPATIENT)
Dept: LAB | Facility: HOSPITAL | Age: 22
End: 2017-07-08
Attending: INTERNAL MEDICINE
Payer: COMMERCIAL

## 2017-07-08 DIAGNOSIS — Z00.00 ANNUAL PHYSICAL EXAM: ICD-10-CM

## 2017-07-08 LAB
CHOLEST/HDLC SERPL: 4.5 {RATIO}
HDL/CHOLESTEROL RATIO: 22.4 %
HDLC SERPL-MCNC: 183 MG/DL
HDLC SERPL-MCNC: 41 MG/DL
LDLC SERPL CALC-MCNC: 133.8 MG/DL
NONHDLC SERPL-MCNC: 142 MG/DL
TRIGL SERPL-MCNC: 41 MG/DL
TSH SERPL DL<=0.005 MIU/L-ACNC: 2.07 UIU/ML

## 2017-07-08 PROCEDURE — 84443 ASSAY THYROID STIM HORMONE: CPT

## 2017-07-08 PROCEDURE — 36415 COLL VENOUS BLD VENIPUNCTURE: CPT

## 2017-07-08 PROCEDURE — 80061 LIPID PANEL: CPT

## 2017-07-12 ENCOUNTER — HOSPITAL ENCOUNTER (OUTPATIENT)
Dept: RADIOLOGY | Facility: HOSPITAL | Age: 22
Discharge: HOME | End: 2017-07-12
Attending: PSYCHIATRY & NEUROLOGY
Payer: COMMERCIAL

## 2017-07-12 ENCOUNTER — TELEPHONE (OUTPATIENT)
Dept: INTERNAL MEDICINE | Facility: CLINIC | Age: 22
End: 2017-07-12

## 2017-07-12 DIAGNOSIS — R20.2 NUMBNESS AND TINGLING OF RIGHT ARM AND LEG: ICD-10-CM

## 2017-07-12 DIAGNOSIS — R51.9 NONINTRACTABLE HEADACHE, UNSPECIFIED CHRONICITY PATTERN, UNSPECIFIED HEADACHE TYPE: ICD-10-CM

## 2017-07-12 DIAGNOSIS — R20.0 NUMBNESS AND TINGLING OF RIGHT ARM AND LEG: ICD-10-CM

## 2017-07-12 PROCEDURE — 70553 MRI BRAIN STEM W/O & W/DYE: CPT | Mod: TC

## 2017-07-12 PROCEDURE — 63600175 PHARM REV CODE 636 W HCPCS: Performed by: RADIOLOGY

## 2017-07-12 PROCEDURE — 25500020 PHARM REV CODE 255: Performed by: PSYCHIATRY & NEUROLOGY

## 2017-07-12 PROCEDURE — A9585 GADOBUTROL INJECTION: HCPCS | Performed by: PSYCHIATRY & NEUROLOGY

## 2017-07-12 PROCEDURE — 70553 MRI BRAIN STEM W/O & W/DYE: CPT | Mod: 26,,, | Performed by: RADIOLOGY

## 2017-07-12 RX ORDER — MIDAZOLAM HYDROCHLORIDE 1 MG/ML
2 INJECTION, SOLUTION INTRAMUSCULAR; INTRAVENOUS ONCE
Status: COMPLETED | OUTPATIENT
Start: 2017-07-12 | End: 2017-07-12

## 2017-07-12 RX ORDER — GADOBUTROL 604.72 MG/ML
7 INJECTION INTRAVENOUS
Status: COMPLETED | OUTPATIENT
Start: 2017-07-12 | End: 2017-07-12

## 2017-07-12 RX ADMIN — GADOBUTROL 7 ML: 604.72 INJECTION INTRAVENOUS at 05:07

## 2017-07-12 RX ADMIN — MIDAZOLAM HYDROCHLORIDE 2 MG: 1 INJECTION, SOLUTION INTRAMUSCULAR; INTRAVENOUS at 05:07

## 2017-07-12 NOTE — NURSING
MD ordered versed 2mg iv to be given to pt in MRI as anxiolytic, NKDA, pts mother present in waiting area & will drive pt home, pt instructed no driving until hever am, pt denies taking any antianxiety, pain meds, or sleep meds today, pt verbalized understanding to all above

## 2017-07-12 NOTE — TELEPHONE ENCOUNTER
----- Message from Anita Rodrigues MD sent at 7/10/2017  9:58 AM CDT -----  Please schedule pt follow up 2 month

## 2017-07-13 ENCOUNTER — LAB VISIT (OUTPATIENT)
Dept: LAB | Facility: HOSPITAL | Age: 22
End: 2017-07-13
Attending: INTERNAL MEDICINE
Payer: COMMERCIAL

## 2017-07-13 ENCOUNTER — OFFICE VISIT (OUTPATIENT)
Dept: INTERNAL MEDICINE | Facility: CLINIC | Age: 22
End: 2017-07-13
Payer: COMMERCIAL

## 2017-07-13 VITALS
SYSTOLIC BLOOD PRESSURE: 108 MMHG | BODY MASS INDEX: 18.82 KG/M2 | HEART RATE: 100 BPM | DIASTOLIC BLOOD PRESSURE: 84 MMHG | HEIGHT: 73 IN | WEIGHT: 142 LBS | TEMPERATURE: 99 F | OXYGEN SATURATION: 99 %

## 2017-07-13 DIAGNOSIS — R10.12 LUQ PAIN: Primary | ICD-10-CM

## 2017-07-13 DIAGNOSIS — R10.12 LUQ PAIN: ICD-10-CM

## 2017-07-13 LAB
ALBUMIN SERPL BCP-MCNC: 4.5 G/DL
ALP SERPL-CCNC: 81 U/L
ALT SERPL W/O P-5'-P-CCNC: 20 U/L
AMYLASE SERPL-CCNC: 130 U/L
ANION GAP SERPL CALC-SCNC: 8 MMOL/L
AST SERPL-CCNC: 17 U/L
BASOPHILS # BLD AUTO: 0.04 K/UL
BASOPHILS NFR BLD: 0.8 %
BILIRUB SERPL-MCNC: 0.4 MG/DL
BILIRUB SERPL-MCNC: ABNORMAL MG/DL
BLOOD URINE, POC: ABNORMAL
BUN SERPL-MCNC: 10 MG/DL
CALCIUM SERPL-MCNC: 9.9 MG/DL
CHLORIDE SERPL-SCNC: 102 MMOL/L
CO2 SERPL-SCNC: 30 MMOL/L
COLOR, POC UA: YELLOW
CREAT SERPL-MCNC: 1 MG/DL
DIFFERENTIAL METHOD: ABNORMAL
EOSINOPHIL # BLD AUTO: 0.1 K/UL
EOSINOPHIL NFR BLD: 1.1 %
ERYTHROCYTE [DISTWIDTH] IN BLOOD BY AUTOMATED COUNT: 13.9 %
EST. GFR  (AFRICAN AMERICAN): >60 ML/MIN/1.73 M^2
EST. GFR  (NON AFRICAN AMERICAN): >60 ML/MIN/1.73 M^2
GLUCOSE SERPL-MCNC: 90 MG/DL
GLUCOSE UR QL STRIP: NORMAL
HCT VFR BLD AUTO: 47.2 %
HGB BLD-MCNC: 15.5 G/DL
KETONES UR QL STRIP: ABNORMAL
LEUKOCYTE ESTERASE URINE, POC: ABNORMAL
LIPASE SERPL-CCNC: 20 U/L
LYMPHOCYTES # BLD AUTO: 1.4 K/UL
LYMPHOCYTES NFR BLD: 27.1 %
MCH RBC QN AUTO: 27.3 PG
MCHC RBC AUTO-ENTMCNC: 32.8 %
MCV RBC AUTO: 83 FL
MONOCYTES # BLD AUTO: 0.5 K/UL
MONOCYTES NFR BLD: 9.4 %
NEUTROPHILS # BLD AUTO: 3.2 K/UL
NEUTROPHILS NFR BLD: 61.4 %
NITRITE, POC UA: ABNORMAL
PH, POC UA: 1
PLATELET # BLD AUTO: 263 K/UL
PMV BLD AUTO: 9.1 FL
POTASSIUM SERPL-SCNC: 3.9 MMOL/L
PROT SERPL-MCNC: 7.8 G/DL
PROTEIN, POC: ABNORMAL
RBC # BLD AUTO: 5.68 M/UL
SODIUM SERPL-SCNC: 140 MMOL/L
SPECIFIC GRAVITY, POC UA: ABNORMAL
UROBILINOGEN, POC UA: NORMAL
WBC # BLD AUTO: 5.24 K/UL

## 2017-07-13 PROCEDURE — 80053 COMPREHEN METABOLIC PANEL: CPT

## 2017-07-13 PROCEDURE — 85025 COMPLETE CBC W/AUTO DIFF WBC: CPT

## 2017-07-13 PROCEDURE — 82150 ASSAY OF AMYLASE: CPT

## 2017-07-13 PROCEDURE — 83690 ASSAY OF LIPASE: CPT

## 2017-07-13 PROCEDURE — 99999 PR PBB SHADOW E&M-EST. PATIENT-LVL IV: CPT | Mod: PBBFAC,,, | Performed by: INTERNAL MEDICINE

## 2017-07-13 PROCEDURE — 81002 URINALYSIS NONAUTO W/O SCOPE: CPT | Mod: S$GLB,,, | Performed by: INTERNAL MEDICINE

## 2017-07-13 PROCEDURE — 36415 COLL VENOUS BLD VENIPUNCTURE: CPT

## 2017-07-13 PROCEDURE — 99213 OFFICE O/P EST LOW 20 MIN: CPT | Mod: 25,S$GLB,, | Performed by: INTERNAL MEDICINE

## 2017-07-13 RX ORDER — ESOMEPRAZOLE MAGNESIUM 40 MG/1
40 CAPSULE, DELAYED RELEASE ORAL
Qty: 30 CAPSULE | Refills: 11 | Status: SHIPPED | OUTPATIENT
Start: 2017-07-13 | End: 2017-08-17 | Stop reason: SDUPTHER

## 2017-07-13 NOTE — PROGRESS NOTES
Subjective:       Patient ID: Darci Vaughan Jr. is a 21 y.o. male.    Chief Complaint: Flank Pain (on left side x 2 days )    Flank Pain   This is a new problem. The current episode started 1 to 4 weeks ago. The problem occurs 2 to 4 times per day. The problem is unchanged. Pain location: left thoracic flank. The quality of the pain is described as stabbing. The pain does not radiate. The pain is at a severity of 3/10. The pain is mild. Pertinent negatives include no abdominal pain, bladder incontinence, bowel incontinence, chest pain, dysuria, fever, headaches, leg pain, numbness, paresis, paresthesias, pelvic pain, perianal numbness, tingling, weakness or weight loss. (Sensation that food gets stuck in mid chest) He has tried nothing for the symptoms. The treatment provided no relief.     Review of Systems   Constitutional: Negative for activity change, appetite change, fever and weight loss.   HENT: Negative for congestion, postnasal drip and sore throat.    Respiratory: Negative for cough, shortness of breath and wheezing.    Cardiovascular: Negative for chest pain and palpitations.   Gastrointestinal: Negative for abdominal pain, blood in stool, bowel incontinence, constipation, diarrhea, nausea and vomiting.   Genitourinary: Positive for flank pain. Negative for bladder incontinence, decreased urine volume, difficulty urinating, dysuria, frequency and pelvic pain.   Musculoskeletal: Negative for arthralgias.   Neurological: Negative for dizziness, tingling, weakness, numbness, headaches and paresthesias.       Objective:      Physical Exam   Constitutional: He is oriented to person, place, and time. He appears well-developed and well-nourished. No distress.   HENT:   Head: Normocephalic and atraumatic.   Right Ear: External ear normal.   Left Ear: External ear normal.   Eyes: Conjunctivae and EOM are normal. Pupils are equal, round, and reactive to light.   Neck: Normal range of motion. Neck supple. No  thyromegaly present.   Cardiovascular: Normal rate and regular rhythm.    Pulmonary/Chest: Effort normal and breath sounds normal. He exhibits tenderness.       Abdominal: Soft. Bowel sounds are normal. He exhibits no mass. There is no tenderness. There is no rebound and no guarding.   Musculoskeletal: Normal range of motion.   Lymphadenopathy:     He has no cervical adenopathy.   Neurological: He is alert and oriented to person, place, and time. He has normal reflexes. He displays normal reflexes. No cranial nerve deficit. He exhibits normal muscle tone. Coordination normal.   Skin: Skin is warm and dry.       Assessment:       1. LUQ pain        Plan:   Darci was seen today for flank pain.    Diagnoses and all orders for this visit:    LUQ pain  -     CBC auto differential; Future  -     Comprehensive metabolic panel; Future  -     Amylase; Future  -     Lipase; Future  -     POCT URINE DIPSTICK WITHOUT MICROSCOPE    Other orders  -     esomeprazole (NEXIUM) 40 MG capsule; Take 1 capsule (40 mg total) by mouth before breakfast.

## 2017-07-17 ENCOUNTER — HOSPITAL ENCOUNTER (EMERGENCY)
Facility: HOSPITAL | Age: 22
Discharge: HOME | End: 2017-07-18
Attending: EMERGENCY MEDICINE
Payer: COMMERCIAL

## 2017-07-17 DIAGNOSIS — R07.9 CHEST PAIN: ICD-10-CM

## 2017-07-17 DIAGNOSIS — R10.13 EPIGASTRIC ABDOMINAL PAIN: Primary | ICD-10-CM

## 2017-07-17 PROCEDURE — 84484 ASSAY OF TROPONIN QUANT: CPT

## 2017-07-17 PROCEDURE — 25000003 PHARM REV CODE 250: Performed by: EMERGENCY MEDICINE

## 2017-07-17 PROCEDURE — 83690 ASSAY OF LIPASE: CPT

## 2017-07-17 PROCEDURE — 99284 EMERGENCY DEPT VISIT MOD MDM: CPT | Mod: ,,, | Performed by: EMERGENCY MEDICINE

## 2017-07-17 PROCEDURE — 99284 EMERGENCY DEPT VISIT MOD MDM: CPT

## 2017-07-17 PROCEDURE — 80053 COMPREHEN METABOLIC PANEL: CPT

## 2017-07-17 PROCEDURE — 93010 ELECTROCARDIOGRAM REPORT: CPT | Mod: ,,, | Performed by: PEDIATRICS

## 2017-07-17 RX ADMIN — ALUMINUM HYDROXIDE, MAGNESIUM HYDROXIDE, AND SIMETHICONE 50 ML: 200; 200; 20 SUSPENSION ORAL at 11:07

## 2017-07-18 VITALS
HEART RATE: 66 BPM | DIASTOLIC BLOOD PRESSURE: 78 MMHG | WEIGHT: 140 LBS | BODY MASS INDEX: 18.55 KG/M2 | OXYGEN SATURATION: 100 % | SYSTOLIC BLOOD PRESSURE: 127 MMHG | RESPIRATION RATE: 18 BRPM | HEIGHT: 73 IN | TEMPERATURE: 98 F

## 2017-07-18 LAB
ALBUMIN SERPL BCP-MCNC: 4.3 G/DL
ALP SERPL-CCNC: 78 U/L
ALT SERPL W/O P-5'-P-CCNC: 15 U/L
ANION GAP SERPL CALC-SCNC: 9 MMOL/L
AST SERPL-CCNC: 15 U/L
BILIRUB SERPL-MCNC: 0.3 MG/DL
BUN SERPL-MCNC: 16 MG/DL
CALCIUM SERPL-MCNC: 9.5 MG/DL
CHLORIDE SERPL-SCNC: 103 MMOL/L
CO2 SERPL-SCNC: 27 MMOL/L
CREAT SERPL-MCNC: 1.1 MG/DL
EST. GFR  (AFRICAN AMERICAN): >60 ML/MIN/1.73 M^2
EST. GFR  (NON AFRICAN AMERICAN): >60 ML/MIN/1.73 M^2
GLUCOSE SERPL-MCNC: 95 MG/DL
LIPASE SERPL-CCNC: 40 U/L
POTASSIUM SERPL-SCNC: 3.8 MMOL/L
PROT SERPL-MCNC: 7.4 G/DL
SODIUM SERPL-SCNC: 139 MMOL/L
TROPONIN I SERPL DL<=0.01 NG/ML-MCNC: <0.006 NG/ML

## 2017-07-18 RX ORDER — SUCRALFATE 1 G/1
1 TABLET ORAL 4 TIMES DAILY
Qty: 60 TABLET | Refills: 0 | Status: ON HOLD | OUTPATIENT
Start: 2017-07-18 | End: 2017-08-30 | Stop reason: HOSPADM

## 2017-07-18 NOTE — PROVIDER PROGRESS NOTES - EMERGENCY DEPT.
Encounter Date: 7/17/2017    ED Physician Progress Notes       SCRIBE NOTE: I, Luis Zhao, am scribing for, and in the presence of,  Dr. Sullivan.  Physician Statement: I, Dr. Sullivan, personally performed the services described in this documentation as scribed by Luis Zhao in my presence, and it is both accurate and complete.      EKG - STEMI Decision  Initial Reading: No STEMI present.

## 2017-07-18 NOTE — ED PROVIDER NOTES
"Encounter Date: 7/17/2017    SCRIBE #1 NOTE: I, Deb Teague, am scribing for, and in the presence of, Dr. Melo.       History     Chief Complaint   Patient presents with    Chest Pain     pt states he has "tingling to his left chest and left arm."     Time patient was seen by the provider: 10:24 PM      The patient is a 21 y.o. male with hx of: pneumothorax that presents to the ED with a complaint of persistent, episodic epigastric pain for the past couple weeks. Pt also reports numbness/tingling in both arms and his right leg that started today. Pt describes the epigastric pain as either tingling, stabbing or pressure. He reports that at times it moves to the left side of his chest. He endorses worsening pain at night when he lays down versus sitting up and after he eats. He takes Prilosec that relieves his pain sometimes. He reports occasional SOB, palpitations and anxiety. He endorses a small appetite and BM's every other day. Pt has an echo scheduled with Dr. Urias in 2 days. Pt denies n/v, melena and bloody stool.      The history is provided by the patient and medical records.     Review of patient's allergies indicates:  No Known Allergies  Past Medical History:   Diagnosis Date    Anxiety     Atelectasis     Spontaneous pneumothorax 10/2012     Past Surgical History:   Procedure Laterality Date    BLEBECTOMY  Dec 2012    for recurrent spontaneous L pneumothorax    CHEST TUBE INSERTION       Family History   Problem Relation Age of Onset    Asthma Mother     Asthma Father     Migraines Father     Cancer Maternal Grandmother      breast    Emphysema Maternal Grandfather     Seizures Sister     Allergies Sister     Migraines Sister      Social History   Substance Use Topics    Smoking status: Passive Smoke Exposure - Never Smoker    Smokeless tobacco: Former User    Alcohol use No     Review of Systems   Constitutional: Negative for chills and fever.   HENT: Negative for congestion.    Eyes: " Negative for redness.   Respiratory: Positive for shortness of breath.    Cardiovascular: Positive for chest pain and palpitations.   Gastrointestinal: Positive for abdominal pain (epigastric). Negative for blood in stool, nausea and vomiting.        (-) melena   Genitourinary: Negative for difficulty urinating.   Musculoskeletal: Negative for myalgias.   Skin: Negative for rash.   Neurological: Positive for numbness (bilateral extremities, right leg).   Psychiatric/Behavioral: The patient is nervous/anxious.        Physical Exam     Initial Vitals [07/17/17 2013]   BP Pulse Resp Temp SpO2   (!) 140/69 98 17 98.5 °F (36.9 °C) 100 %      MAP       92.67         Physical Exam    Nursing note and vitals reviewed.  Constitutional: He appears well-developed and well-nourished. He is not diaphoretic. No distress.   HENT:   Head: Normocephalic and atraumatic.   Eyes: EOM are normal. Pupils are equal, round, and reactive to light.   Neck: Normal range of motion. Neck supple. No JVD present.   Cardiovascular: Normal rate, regular rhythm and normal heart sounds. Exam reveals no gallop and no friction rub.    No murmur heard.  Pulmonary/Chest: Breath sounds normal. No stridor. No respiratory distress. He has no wheezes. He has no rhonchi. He has no rales.   Abdominal:   (+) LUQ tenderness   Musculoskeletal: Normal range of motion. He exhibits no edema or tenderness.   Neurological: He is alert and oriented to person, place, and time. He has normal strength. No cranial nerve deficit or sensory deficit.   Skin: Skin is warm and dry. No rash noted. No erythema.   Psychiatric:   (+) mildly anxious, reserved         ED Course   Procedures  Labs Reviewed   COMPREHENSIVE METABOLIC PANEL   LIPASE   TROPONIN I     EKG Readings: (Independently Interpreted)   Normal sinus rhythm at 99. Nml axis. Incomplete RBBB. No hypertrophy. No ST or T wave abnormalities. No significant changes from prior EKG on 7/7/2017.          Medical Decision  Making:   History:   Old Medical Records: I decided to obtain old medical records.  Old Records Summarized: other records.       <> Summary of Records: Hx of pneumothorax diagnosed 1/5/2014. Few ED visits this year for chest pain and palpitations.   Initial Assessment:   Pt presents with persistent episodic epigastric abdominal pain. My initial differential diagnoses include but are not limited to gastritis verus ulcer, GERD and pancreatitis. Less likely pericarditis, myocarditis, ACS, pneumothorax, and pulmonary embolism.  Plan is to obtain CXR, EKG, troponin, CMP, lipase and GI cocktail.  Independently Interpreted Test(s):   I have ordered and independently interpreted EKG Reading(s) - see prior notes  Clinical Tests:   Lab Tests: Reviewed and Ordered  Radiological Study: Ordered and Reviewed  Medical Tests: Reviewed and Ordered            Scribe Attestation:   Scribe #1: I performed the above scribed service and the documentation accurately describes the services I performed. I attest to the accuracy of the note.    Attending Attestation:           Physician Attestation for Scribe:  Physician Attestation Statement for Scribe #1: I, Dr. Melo, reviewed documentation, as scribed by Deb Teague in my presence, and it is both accurate and complete.         Attending ED Notes:   Workup unremarkable.  I doubt ACS at this time.  No evidence of pneumothorax.  Patient likely has persistent GERD.  Advised diet instructions and referral to GI for further evaluation.  Also advised to continue PPI.  Return if any concerns.          ED Course     Clinical Impression:   The encounter diagnosis was Chest pain.                           Lyndon Melo MD  07/21/17 0679

## 2017-07-18 NOTE — ED TRIAGE NOTES
"Darci Vaughan Jr., a 21 y.o. male presents to the ED with c/o intermittent "stabbing" chest pain accompanied by tingling to bilateral arms, right leg and left side of neck. Denies N/V. Pt also c/o "knot" to left chest wall.      Chief Complaint   Patient presents with    Chest Pain     pt states he has "tingling to his left chest and left arm."     Review of patient's allergies indicates:  No Known Allergies  Past Medical History:   Diagnosis Date    Anxiety     Atelectasis     Spontaneous pneumothorax 10/2012       "

## 2017-07-19 ENCOUNTER — HOSPITAL ENCOUNTER (OUTPATIENT)
Dept: CARDIOLOGY | Facility: CLINIC | Age: 22
Discharge: HOME | End: 2017-07-19
Payer: COMMERCIAL

## 2017-07-19 ENCOUNTER — TELEPHONE (OUTPATIENT)
Dept: NEUROLOGY | Facility: CLINIC | Age: 22
End: 2017-07-19

## 2017-07-19 DIAGNOSIS — R42 DIZZINESS: ICD-10-CM

## 2017-07-19 LAB
DIASTOLIC DYSFUNCTION: NO
RETIRED EF AND QEF - SEE NOTES: 55 (ref 55–65)

## 2017-07-19 PROCEDURE — 93325 DOPPLER ECHO COLOR FLOW MAPG: CPT | Mod: S$GLB,,, | Performed by: INTERNAL MEDICINE

## 2017-07-19 PROCEDURE — 93303 ECHO TRANSTHORACIC: CPT | Mod: S$GLB,,, | Performed by: INTERNAL MEDICINE

## 2017-07-19 PROCEDURE — 93320 DOPPLER ECHO COMPLETE: CPT | Mod: S$GLB,,, | Performed by: INTERNAL MEDICINE

## 2017-07-19 NOTE — TELEPHONE ENCOUNTER
I called patient twice to let him know that his MRI brain is normal but he was not available and thus left a message.  OC

## 2017-07-20 ENCOUNTER — INITIAL CONSULT (OUTPATIENT)
Dept: CARDIOLOGY | Facility: CLINIC | Age: 22
End: 2017-07-20
Payer: COMMERCIAL

## 2017-07-20 VITALS
OXYGEN SATURATION: 97 % | WEIGHT: 143.94 LBS | HEART RATE: 90 BPM | HEIGHT: 73 IN | DIASTOLIC BLOOD PRESSURE: 76 MMHG | BODY MASS INDEX: 19.08 KG/M2 | SYSTOLIC BLOOD PRESSURE: 117 MMHG

## 2017-07-20 DIAGNOSIS — R07.2 PRECORDIAL PAIN: ICD-10-CM

## 2017-07-20 DIAGNOSIS — Q24.9 ADULT CONGENITAL HEART DISEASE: Primary | ICD-10-CM

## 2017-07-20 PROCEDURE — 99244 OFF/OP CNSLTJ NEW/EST MOD 40: CPT | Mod: S$GLB,,, | Performed by: INTERNAL MEDICINE

## 2017-07-20 PROCEDURE — 99999 PR PBB SHADOW E&M-EST. PATIENT-LVL III: CPT | Mod: PBBFAC,,, | Performed by: INTERNAL MEDICINE

## 2017-07-20 RX ORDER — NAPROXEN 375 MG/1
TABLET ORAL
Refills: 0 | COMMUNITY
Start: 2017-07-15 | End: 2017-08-16

## 2017-07-20 NOTE — ASSESSMENT & PLAN NOTE
We completed [email or web address]. He does not have features suggestive of Marfan's. We will refer him to optho as he has a high myopia.

## 2017-07-20 NOTE — LETTER
July 20, 2017      Anita Rodrigues MD  [address]  [facility] [address]           [facility]  [address]  [facility] [address]  Phone: [phone]          Patient: Darci Vaughan Jr.   MR Number: [id number]   YOB: 1995   Date of Visit: 7/20/2017       Dear Dr. Anita Rodrigues:    Thank you for referring Darci Vaughan to me for evaluation. Attached you will find relevant portions of my assessment and plan of care.    If you have questions, please do not hesitate to call me. I look forward to following Darci Vaughan along with you.    Sincerely,    Darlin Fabian MD    Enclosure  CC:  No Recipients    If you would like to receive this communication electronically, please contact [email or web address] or [phone] to request more information on [employer] Link access.    For providers and/or their staff who would like to refer a patient to [facility], please contact us through our one-stop-shop provider referral line, [facility] , at [phone].    If you feel you have received this communication in error or would no longer like to receive these types of communications, please e-mail [email or web address]

## 2017-07-20 NOTE — ASSESSMENT & PLAN NOTE
Discussed that he should take the naproxen for the next 4 days twice a day with meals and see if that help with his chest pains. He states the nexium has helped a little. He has not taken the Carafate.  He is concerned about his high HR. He only drinks water. He does not eat much. Grandma states he just gets a worried look when he has chest pains. They seem to occur off and on all day.  We will order an exercise treadmill and Holter. If these are normal then will have him return to work.

## 2017-07-20 NOTE — PROGRESS NOTES
Referring Provider: Referring provider: Anita Rodrigues MD      Subjective:   Patient ID:  Darci Vaughan Jr. is a 21 y.o. male who presents for evaluation of Adult congenital heart disease (Marfans)      HPI Patient is seen in our Adult Congenital Heart Defect Clinic.  He has had recurrent HA with dizziness and pneumothorax. There is concern he has Marfanoid features.    He has had 5 ER visit thus far this year (epistaxis;, chest pains, chest pains and palpitations)    He is concerned about his chest pains - Chest pains stabbing and goes aways in minutes; no sweating; some dyspnea. He does do any heavy exertion currently as he has not been going to do work at [employer] secondary to chest pains    Two younger sisters.    Grandma is here with him; he lives with her. There is no family history of Marfan's     We completed the [email or web address]. He does not have supportive features of Marfan's.       Congenital Heart History:  none    Other Medical problems:  1. History of Pneumothorax x 2 with Blebectomy in 2012 (chest x ray looks normal)      Cardiac Testing:  Echo cardiogram 7/19/2017  CONCLUSIONS     1 - Normal left ventricular systolic function (EF 55-60%).     2 - Normal left ventricular diastolic function.     3 - Normal right ventricular systolic function .     4 - Normal size proximal aorta.   Other testing:  Brain MRI: no vascular abnormalities  Xray 7/17/2017: no scoliosis    Review of Systems   Constitution: Negative for weight gain and weight loss.   HENT: Positive for headaches.    Eyes: Negative for blurred vision.   Cardiovascular: Positive for chest pain. Negative for claudication, cyanosis, dyspnea on exertion, irregular heartbeat, leg swelling, near-syncope, orthopnea, palpitations, paroxysmal nocturnal dyspnea and syncope.   Respiratory: Negative for cough.    Endocrine: Negative for polydipsia and polyuria.   Hematologic/Lymphatic: Does not bruise/bleed  "easily.   Skin: Negative for color change and dry skin.   Musculoskeletal: Negative for falls, muscle cramps, muscle weakness and myalgias.   Gastrointestinal: Negative for abdominal pain, change in bowel habit, heartburn, hematemesis and hematochezia.   Neurological: Negative for brief paralysis, focal weakness, light-headedness and paresthesias.   Psychiatric/Behavioral: Negative for altered mental status.   Allergic/Immunologic: Negative for environmental allergies.         Allergies and current medications updated and reviewed:  Review of patient's allergies indicates:  No Known Allergies  Current Outpatient Prescriptions   Medication Sig Dispense Refill    esomeprazole (NEXIUM) 40 MG capsule Take 1 capsule (40 mg total) by mouth before breakfast. 30 capsule 11    meclizine (ANTIVERT) 25 mg tablet TAKE 1 TABLET BY MOUTH 3 TIMES A DAY FOR 5 DAYS AS NEEDED FOR DIZZINESS  0    naproxen (NAPROSYN) 375 MG tablet TAKE 1 TABLET BY MOUTH TWICE A DAY FOR 10 DAYS AS NEEDED FOR PAIN  0    sucralfate (CARAFATE) 1 gram tablet Take 1 tablet (1 g total) by mouth 4 (four) times daily. 60 tablet 0     No current facility-administered medications for this visit.        Objective:   Right Arm BP - Sittin/78 (17 0817)  Left Arm BP - Sittin/76 (17 0817)  /76 (BP Location: Left arm, Patient Position: Sitting, BP Method: Automatic)   Pulse 90   Ht 6' 1" (1.854 m)   Wt 65.3 kg (143 lb 15.4 oz)   SpO2 97%   BMI 18.99 kg/m²     Body surface area is 1.83 meters squared.  Physical Exam   Constitutional: He is oriented to person, place, and time. Vital signs are normal. He appears well-developed and well-nourished.   /76 (BP Location: Left arm, Patient Position: Sitting, BP Method: Automatic)   Pulse 90   Ht 6' 1" (1.854 m)   Wt 65.3 kg (143 lb 15.4 oz)   SpO2 97%   BMI 18.99 kg/m²   @FLOWWTIME(27516,10629,34003,38682,04161,50033,17529,46240,74601,58255)@           HENT:   Mouth/Throat: " Uvula is midline, oropharynx is clear and moist and mucous membranes are normal. Mucous membranes are not pale, not dry and not cyanotic.   Eyes: Conjunctivae and lids are normal.   Neck: Neck supple. No hepatojugular reflux and no JVD present. Carotid bruit is not present. No thyromegaly present.   Cardiovascular: Normal rate, regular rhythm, S1 normal and S2 normal.  PMI is not displaced.    No murmur heard.  Pulses:       Carotid pulses are 2+ on the right side, and 2+ on the left side.       Radial pulses are 2+ on the right side, and 2+ on the left side.        Femoral pulses are 2+ on the right side, and 2+ on the left side.       Posterior tibial pulses are 2+ on the right side, and 2+ on the left side.   Pulmonary/Chest: Effort normal and breath sounds normal.   No kyphosis or scloiosis   Abdominal: Normal appearance and bowel sounds are normal. There is no hepatomegaly. There is no tenderness.   Musculoskeletal:        Right upper leg: He exhibits no edema.        Left upper leg: He exhibits no edema.        Right lower leg: He exhibits no edema.        Left lower leg: He exhibits no edema.   No limitations in exercise ability.      Neurological: He is alert and oriented to person, place, and time. He has normal strength.   Skin: Skin is warm, dry and intact. No rash noted. No cyanosis. Nails show no clubbing.   Psychiatric: He has a normal mood and affect. His speech is normal and behavior is normal.       Chemistry        Component Value Date/Time     07/17/2017 2320    K 3.8 07/17/2017 2320     07/17/2017 2320    CO2 27 07/17/2017 2320    BUN 16 07/17/2017 2320    CREATININE 1.1 07/17/2017 2320    GLU 95 07/17/2017 2320        Component Value Date/Time    CALCIUM 9.5 07/17/2017 2320    ALKPHOS 78 07/17/2017 2320    AST 15 07/17/2017 2320    ALT 15 07/17/2017 2320    BILITOT 0.3 07/17/2017 2320    ESTGFRAFRICA >60.0 07/17/2017 2320    EGFRNONAA >60.0 07/17/2017 2320              Recent  Labs  Lab 07/08/17  0904 07/13/17  1536   WHITE BLOOD CELL COUNT  --  5.24   HEMOGLOBIN  --  15.5   HEMATOCRIT  --  47.2   MCV  --  83   PLATELETS  --  263   TSH 2.069  --    CHOLESTEROL 183  --    HDL 41  --    LDL CHOLESTEROL 133.8  --    TRIGLYCERIDES 41  --    HDL/CHOLESTEROL RATIO 22.4  --                 Test(s) Reviewed  I have reviewed the following in detail:  [] Stress test   [] Angiography   [x] Echocardiogram- normal size aorta   [x] Labs   [x] Other:  Brain MRI and Xray reviewed Xray images no scoliosis       Assessment/Plan:   Adult congenital heart disease  We completed [email or web address]. He does not have features suggestive of Marfan's. We will refer him to optho as he has a high myopia.    Precordial pain  Discussed that he should take the naproxen for the next 4 days twice a day with meals and see if that help with his chest pains. He states the nexium has helped a little. He has not taken the Carafate.  He is concerned about his high HR. He only drinks water. He does not eat much. Grandma states he just gets a worried look when he has chest pains. They seem to occur off and on all day.  We will order an exercise treadmill and Holter. If these are normal then will have him return to work.      Return in about 4 weeks (around 8/17/2017).   Orders Placed This Encounter   Procedures    Ambulatory consult to Ophthalmology    Holter Monitor - [facility]    Cardiac treadmill stress test

## 2017-07-21 ENCOUNTER — TELEPHONE (OUTPATIENT)
Dept: INTERNAL MEDICINE | Facility: CLINIC | Age: 22
End: 2017-07-21

## 2017-07-21 ENCOUNTER — CLINICAL SUPPORT (OUTPATIENT)
Dept: PEDIATRIC CARDIOLOGY | Facility: CLINIC | Age: 22
End: 2017-07-21
Payer: COMMERCIAL

## 2017-07-21 ENCOUNTER — HOSPITAL ENCOUNTER (OUTPATIENT)
Dept: CARDIOLOGY | Facility: CLINIC | Age: 22
Discharge: HOME | End: 2017-07-21
Payer: COMMERCIAL

## 2017-07-21 DIAGNOSIS — Q24.9 ADULT CONGENITAL HEART DISEASE: ICD-10-CM

## 2017-07-21 DIAGNOSIS — R10.13 EPIGASTRIC PAIN: ICD-10-CM

## 2017-07-21 DIAGNOSIS — K21.9 GASTROESOPHAGEAL REFLUX DISEASE, ESOPHAGITIS PRESENCE NOT SPECIFIED: Primary | ICD-10-CM

## 2017-07-21 LAB — DIASTOLIC DYSFUNCTION: NO

## 2017-07-21 PROCEDURE — 93227 XTRNL ECG REC<48 HR R&I: CPT | Mod: S$GLB,,, | Performed by: PEDIATRICS

## 2017-07-21 PROCEDURE — 93015 CV STRESS TEST SUPVJ I&R: CPT | Mod: S$GLB,,, | Performed by: INTERNAL MEDICINE

## 2017-07-21 NOTE — TELEPHONE ENCOUNTER
----- Message from Nicolette Velarde sent at 7/21/2017 11:31 AM CDT -----  Contact: Self/ [phone]   Type: Sooner appointment than  is able to schedule    When is the first available appointment? 08/18/2017     What is the nature of the appointment? To receive a referral for a Gastro doctor     What appointment type: ep     Comments: pt is calling to receive a sooner appt. Please call and advise     Thank you

## 2017-07-24 DIAGNOSIS — R05.9 COUGH: Primary | ICD-10-CM

## 2017-07-24 NOTE — PROGRESS NOTES
Subjective:       Patient ID: Darci Vaughan Jr. is a 21 y.o. male.    Chief Complaint: No chief complaint on file.    HPI:   Darci Vaughan Jr. is a 21 y.o. male who presents with history of recurrent pneumothorax and concern for possible asthma.    In 2012, the patient was taking a bath when he noted a sharp pain on the left side of his chest that radiated upwards towards the shoulder.  He suddenly became short of breath.  He then was taken to the ED at [facility].  Imaging showed you had a pneumothorax.  He received a small bore chest tube was place and he was admitted to the hospital.  His lung re-expanded, the tube was removed and he was discharged from the hospital.     Approximately 1 month later, he was at school- studying, not in any sports like activity- and similar symptoms recurred.  He was again taken to the ED, found to have a left sided pnx and chest tube was placed and he underwent VATS apical resection, blebectomy, and pleurodesis.      He has had no pneumothorax since that time, but reports that he has had similar symptoms that he has chosen to wait out.   Pain is mostly on the left side, but occasionally on the right.  Describes the pain sharp, stabbing.  Associated with wheezing.  No exacerbating factors.  No associated cough.  Lasts 30-60 sec and then self resolved.  Somewhat pleuritic in nature.      Review of Systems   Constitutional: Negative for fever, chills and activity change.   HENT: Negative for postnasal drip, rhinorrhea, sinus pressure and congestion.    Respiratory: Positive for wheezing and dyspnea on extertion. Negative for cough, hemoptysis and shortness of breath.    Cardiovascular: Negative for chest pain and leg swelling.   Genitourinary: Negative for difficulty urinating.   Endocrine: Negative for cold intolerance and heat intolerance.    Musculoskeletal: Negative for joint swelling and myalgias.   Gastrointestinal: Negative for nausea, vomiting and abdominal pain.   Neurological:  Positive for headaches.   Hematological: Negative for adenopathy. No excessive bruising.   Psychiatric/Behavioral: Negative for confusion and sleep disturbance.         Social History   Substance Use Topics    Smoking status: Passive Smoke Exposure - Never Smoker    Smokeless tobacco: Former User    Alcohol use No       Review of patient's allergies indicates:  No Known Allergies  Past Medical History:   Diagnosis Date    Anxiety     Atelectasis     Spontaneous pneumothorax 10/2012     Past Surgical History:   Procedure Laterality Date    BLEBECTOMY  Dec 2012    for recurrent spontaneous L pneumothorax    CHEST TUBE INSERTION       Current Outpatient Prescriptions on File Prior to Visit   Medication Sig    esomeprazole (NEXIUM) 40 MG capsule Take 1 capsule (40 mg total) by mouth before breakfast.    meclizine (ANTIVERT) 25 mg tablet TAKE 1 TABLET BY MOUTH 3 TIMES A DAY FOR 5 DAYS AS NEEDED FOR DIZZINESS    naproxen (NAPROSYN) 375 MG tablet TAKE 1 TABLET BY MOUTH TWICE A DAY FOR 10 DAYS AS NEEDED FOR PAIN    sucralfate (CARAFATE) 1 gram tablet Take 1 tablet (1 g total) by mouth 4 (four) times daily.     No current facility-administered medications on file prior to visit.        Objective:      Physical Exam   Constitutional: He is oriented to person, place, and time. He appears well-developed and well-nourished. No distress.   HENT:   Head: Normocephalic.   Neck: Normal range of motion.   Cardiovascular: Normal rate, regular rhythm and normal heart sounds.    No murmur heard.  Pulmonary/Chest: Effort normal and breath sounds normal. He has no wheezes. He has no rhonchi. He has no rales.   Abdominal: Soft. Bowel sounds are normal. He exhibits no distension. There is no tenderness.   Musculoskeletal: Normal range of motion. He exhibits no edema.   Quite tall and thin   Neurological: He is alert and oriented to person, place, and time.   Skin: Skin is warm and dry. He is not diaphoretic. No cyanosis. Nails  show no clubbing.   Psychiatric: He has a normal mood and affect.   Vitals reviewed.    Personal Diagnostic Review  PFTs: 7/25/17: no obstruction.  Evidence of hyperinflation.  Normal DLCO        Assessment:     Orders Placed This Encounter   Procedures    CT Chest Without Contrast     Standing Status:   Future     Standing Expiration Date:   7/25/2018     Order Specific Question:   Reason for Exam:     Answer:   evaluation of lung parenchyma; history of recurrent pneumothorax     Order Specific Question:   May the Radiologist modify the order per protocol to meet the clinical needs of the patient?     Answer:   Yes     1. Recurrent spontaneous pneumothorax        Plan:       -CT Chest for evaluation of lung parenchyma to determine if additional work up regarding his history of pneumothorax and evidence of hyperinflation is warranted (e.g marfan's, lch, etc)  -Presently a never smoker, but counseled strongly against any future smoking  -Start albuterol twice daily (for now) plus prn to see if it improves his dyspnea and pleuritic chest pain

## 2017-07-25 ENCOUNTER — HOSPITAL ENCOUNTER (OUTPATIENT)
Dept: PULMONOLOGY | Facility: CLINIC | Age: 22
Discharge: HOME | End: 2017-07-25
Payer: COMMERCIAL

## 2017-07-25 ENCOUNTER — OFFICE VISIT (OUTPATIENT)
Dept: PULMONOLOGY | Facility: CLINIC | Age: 22
End: 2017-07-25
Payer: COMMERCIAL

## 2017-07-25 VITALS
SYSTOLIC BLOOD PRESSURE: 118 MMHG | WEIGHT: 141 LBS | HEIGHT: 73 IN | HEART RATE: 96 BPM | OXYGEN SATURATION: 99 % | DIASTOLIC BLOOD PRESSURE: 76 MMHG | BODY MASS INDEX: 18.69 KG/M2

## 2017-07-25 DIAGNOSIS — R05.9 COUGH: ICD-10-CM

## 2017-07-25 DIAGNOSIS — J45.20 MILD INTERMITTENT ASTHMA WITHOUT COMPLICATION: ICD-10-CM

## 2017-07-25 DIAGNOSIS — J93.83 RECURRENT SPONTANEOUS PNEUMOTHORAX: Primary | ICD-10-CM

## 2017-07-25 DIAGNOSIS — R06.2 WHEEZING: ICD-10-CM

## 2017-07-25 LAB
PRE FEV1 FVC: 97
PRE FEV1: 4.07
PRE FVC: 4.21
PREDICTED FEV1 FVC: 85
PREDICTED FEV1: 4.33
PREDICTED FVC: 5.01

## 2017-07-25 PROCEDURE — 94727 GAS DIL/WSHOT DETER LNG VOL: CPT | Mod: S$GLB,,, | Performed by: INTERNAL MEDICINE

## 2017-07-25 PROCEDURE — 94010 BREATHING CAPACITY TEST: CPT | Mod: 51,S$GLB,, | Performed by: INTERNAL MEDICINE

## 2017-07-25 PROCEDURE — 99999 PR PBB SHADOW E&M-EST. PATIENT-LVL III: CPT | Mod: PBBFAC,,, | Performed by: INTERNAL MEDICINE

## 2017-07-25 PROCEDURE — 99204 OFFICE O/P NEW MOD 45 MIN: CPT | Mod: S$GLB,,, | Performed by: INTERNAL MEDICINE

## 2017-07-25 PROCEDURE — 94729 DIFFUSING CAPACITY: CPT | Mod: S$GLB,,, | Performed by: INTERNAL MEDICINE

## 2017-07-25 RX ORDER — ALBUTEROL SULFATE 90 UG/1
1 AEROSOL, METERED RESPIRATORY (INHALATION) EVERY 12 HOURS
Qty: 1 INHALER | Refills: 5 | Status: SHIPPED | OUTPATIENT
Start: 2017-07-25 | End: 2017-11-28 | Stop reason: SDUPTHER

## 2017-07-25 RX ORDER — ALBUTEROL SULFATE 90 UG/1
2 AEROSOL, METERED RESPIRATORY (INHALATION) EVERY 12 HOURS
Status: DISCONTINUED | OUTPATIENT
Start: 2017-07-25 | End: 2017-07-25

## 2017-07-25 NOTE — LETTER
July 25, 2017      Anita Rodrigues MD  [address]  [place] [address]           [facility] - Pulmonary Services  [address]  [place] [address]  Phone: [phone]          Patient: Darci Vaughan Jr.   MR Number: [id number]   YOB: 1995   Date of Visit: 7/25/2017       Dear Dr. Anita Rodrigues:    Thank you for referring Darci Vaughan to me for evaluation. Attached you will find relevant portions of my assessment and plan of care.    If you have questions, please do not hesitate to call me. I look forward to following Darci Vaughan along with you.    Sincerely,    Yolis Mejia MD    Enclosure  CC:  No Recipients    If you would like to receive this communication electronically, please contact [email or web address] or [phone] to request more information on [employer] Link access.    For providers and/or their staff who would like to refer a patient to [facility], please contact us through our one-stop-shop provider referral line, [facility], at [phone].    If you feel you have received this communication in error or would no longer like to receive these types of communications, please e-mail [email or web address]

## 2017-08-02 ENCOUNTER — HOSPITAL ENCOUNTER (OUTPATIENT)
Dept: RADIOLOGY | Facility: HOSPITAL | Age: 22
Discharge: HOME | End: 2017-08-02
Attending: INTERNAL MEDICINE
Payer: COMMERCIAL

## 2017-08-02 DIAGNOSIS — J93.83 RECURRENT SPONTANEOUS PNEUMOTHORAX: ICD-10-CM

## 2017-08-02 PROCEDURE — 71250 CT THORAX DX C-: CPT | Mod: TC

## 2017-08-02 PROCEDURE — 71250 CT THORAX DX C-: CPT | Mod: 26,,, | Performed by: RADIOLOGY

## 2017-08-03 ENCOUNTER — NURSE TRIAGE (OUTPATIENT)
Dept: ADMINISTRATIVE | Facility: CLINIC | Age: 22
End: 2017-08-03

## 2017-08-03 NOTE — TELEPHONE ENCOUNTER
"  Reason for Disposition   SEVERE dizziness (e.g., unable to stand, requires support to walk, feels like passing out now)     Patient states he "thinks he can walk but he feels like he is going to "fall" or pass out.    Protocols used: ST DIZZINESS-A-OH    "

## 2017-08-03 NOTE — TELEPHONE ENCOUNTER
"Patient advised per OOC protocol verbalized understanding, agreed with recommendations to have another adult transport to assist him to seek medical care at the nearest/local [facility] of choice for medical evaluation/treatment/intervention of current symptoms; patient had no further questions at end of call.    Patient states the symptoms begin after taking ProAir inhaler, states he feels   "hot all over his body"   "

## 2017-08-14 ENCOUNTER — PATIENT MESSAGE (OUTPATIENT)
Dept: CRITICAL CARE MEDICINE | Facility: HOSPITAL | Age: 22
End: 2017-08-14

## 2017-08-14 ENCOUNTER — NURSE TRIAGE (OUTPATIENT)
Dept: ADMINISTRATIVE | Facility: CLINIC | Age: 22
End: 2017-08-14

## 2017-08-15 NOTE — TELEPHONE ENCOUNTER
"  Reason for Disposition   Patient sounds very sick or weak to the triager    Answer Assessment - Initial Assessment Questions  1. LOCATION: "Where does it hurt?"        Left upper chest    3. ONSET: "When did the chest pain begin?" (Minutes, hours or days)       1 hour ago  4. PATTERN "Does the pain come and go, or has it been constant since it started?"  "Does it get worse with exertion?"       Has resolved--wasn't really pain but felt a "pop" in left chest  5. DURATION: "How long does it last" (e.g., seconds, minutes, hours)      brief  6. SEVERITY: "How bad is the pain?"  (e.g., Scale 1-10; mild, moderate, or severe)     - MILD (1-3): doesn't interfere with normal activities      - MODERATE (4-7): interferes with normal activities or awakens from sleep     - SEVERE (8-10): excruciating pain, unable to do any normal activities        None   7. CARDIAC RISK FACTORS: "Do you have any history of heart problems or risk factors for heart disease?" (e.g., prior heart attack, angina; high blood pressure, diabetes, being overweight, high cholesterol, smoking, or strong family history of heart disease)      Being seen by Ped Card and Pulm  8. PULMONARY RISK FACTORS: "Do you have any history of lung disease?"  (e.g., blood clots in lung, asthma, emphysema, birth control pills)      Hx of collapsed lung several years ago  9. CAUSE: "What do you think is causing the chest pain?"      Doesn't know  10. OTHER SYMPTOMS: "Do you have any other symptoms?" (e.g., dizziness, nausea, vomiting, sweating, fever, difficulty breathing, cough)        At time he felt "pop" he had palpitations that have stopped/ states he was scared/ states in area of the "pop" sensation he has a red spot that is going away now  11. PREGNANCY: "Is there any chance you are pregnant?" "When was your last menstrual period?"        n/a    Protocols used: ST CHEST PAIN-A-AH  ref to ped cardiology due to pt's recent cardiology w/up as well as Pulmonary hx/ pt " doesn't seem to be in any distress @ this time    Yuko Rincon RN

## 2017-08-16 ENCOUNTER — OFFICE VISIT (OUTPATIENT)
Dept: INTERNAL MEDICINE | Facility: CLINIC | Age: 22
End: 2017-08-16
Attending: FAMILY MEDICINE
Payer: COMMERCIAL

## 2017-08-16 VITALS
HEART RATE: 100 BPM | SYSTOLIC BLOOD PRESSURE: 116 MMHG | DIASTOLIC BLOOD PRESSURE: 68 MMHG | BODY MASS INDEX: 18.51 KG/M2 | WEIGHT: 139.63 LBS | HEIGHT: 73 IN

## 2017-08-16 DIAGNOSIS — Z87.09 HX OF PNEUMOTHORAX: ICD-10-CM

## 2017-08-16 DIAGNOSIS — M79.601 PAIN OF RIGHT UPPER EXTREMITY: Primary | ICD-10-CM

## 2017-08-16 DIAGNOSIS — R07.9 CHEST PAIN, UNSPECIFIED TYPE: ICD-10-CM

## 2017-08-16 DIAGNOSIS — J43.9 LUNG DISEASE, BULLOUS: ICD-10-CM

## 2017-08-16 PROCEDURE — 99999 PR PBB SHADOW E&M-EST. PATIENT-LVL III: CPT | Mod: PBBFAC,,, | Performed by: FAMILY MEDICINE

## 2017-08-16 PROCEDURE — 3008F BODY MASS INDEX DOCD: CPT | Mod: S$GLB,,, | Performed by: FAMILY MEDICINE

## 2017-08-16 PROCEDURE — 99214 OFFICE O/P EST MOD 30 MIN: CPT | Mod: S$GLB,,, | Performed by: FAMILY MEDICINE

## 2017-08-16 NOTE — PATIENT INSTRUCTIONS
OTC Ibuprofen 200 mg tablets. Take 3 tablets every 6 hours for 3-5 days as needed for pain, fever.

## 2017-08-16 NOTE — PROGRESS NOTES
Subjective:       Patient ID: Darci Vaughan Jr. is a 21 y.o. male.    Chief Complaint: Arm Pain (right arm)    HPI  Review of Systems   Constitutional: Positive for fatigue. Negative for chills and fever.   HENT: Negative for congestion and trouble swallowing.    Eyes: Negative for redness.   Respiratory: Positive for cough. Negative for chest tightness and shortness of breath.    Cardiovascular: Positive for chest pain. Negative for palpitations and leg swelling.   Gastrointestinal: Negative for abdominal pain and blood in stool.   Genitourinary: Negative for hematuria.   Musculoskeletal: Positive for arthralgias. Negative for back pain, gait problem, joint swelling, myalgias and neck pain.   Skin: Negative for color change and rash.   Neurological: Positive for headaches. Negative for tremors, speech difficulty, weakness and numbness.   Hematological: Negative for adenopathy. Does not bruise/bleed easily.   Psychiatric/Behavioral: Negative for behavioral problems, confusion and sleep disturbance. The patient is not nervous/anxious.        Objective:      Physical Exam   Constitutional: He is oriented to person, place, and time. He appears well-developed and well-nourished. No distress.   Eyes: No scleral icterus.   Neck: Trachea normal, normal range of motion and full passive range of motion without pain. Neck supple. Carotid bruit is not present. No thyroid mass present.   Cardiovascular: Normal rate, regular rhythm, normal heart sounds and intact distal pulses.  Exam reveals no gallop and no friction rub.    No murmur heard.  Pulmonary/Chest: Effort normal and breath sounds normal. No respiratory distress. He has no wheezes. He has no rales.   Abdominal: Soft. Bowel sounds are normal.   Musculoskeletal: He exhibits no edema.        Right shoulder: He exhibits normal range of motion and no tenderness.        Left shoulder: He exhibits normal range of motion and no tenderness.        Cervical back: He exhibits  normal range of motion and no tenderness.        Right hand: Normal sensation noted. Decreased sensation is not present in the ulnar distribution, is not present in the medial distribution and is not present in the radial distribution. Normal strength noted. He exhibits no finger abduction, no thumb/finger opposition and no wrist extension trouble.        Left hand: Normal sensation noted. Decreased sensation is not present in the ulnar distribution, is not present in the medial redistribution and is not present in the radial distribution. Normal strength noted. He exhibits no finger abduction, no thumb/finger opposition and no wrist extension trouble.   Lymphadenopathy:     He has no cervical adenopathy.   Neurological: He is alert and oriented to person, place, and time. He has normal strength. He displays no tremor. No cranial nerve deficit or sensory deficit. Coordination and gait normal.   Reflex Scores:       Tricep reflexes are 2+ on the right side and 2+ on the left side.       Bicep reflexes are 2+ on the right side and 2+ on the left side.       Brachioradialis reflexes are 2+ on the right side and 2+ on the left side.  Skin: Skin is warm and dry. No rash noted. He is not diaphoretic. No erythema.   Psychiatric: He has a normal mood and affect. His behavior is normal. Judgment and thought content normal.   Nursing note and vitals reviewed.      Assessment:       1. Pain of right upper extremity    2. Chest pain, unspecified type    3. Lung disease, bullous    4. Hx of pneumothorax        Plan:   Darci was seen today for arm pain.    Diagnoses and all orders for this visit:    Pain of right upper extremity    Chest pain, unspecified type    Lung disease, bullous    Hx of pneumothorax      See meds, orders, follow up, routing and instructions sections of encounter.  A 21-year-old patient who presents with multiple complaints.  His primary   complaint was arm pain, but he had chest pain.    It appears that  he has been adequately worked up for his chest.  He has   followups with cardiologist pending.  He had recent stress test sonography.  He   does have a history of bullous pulmonary disease and two spontaneous   pneumothoraces.  His most recent CAT scan revealed no recurrent pneumothorax.    His symptoms today are simply baseline as far as cardiopulmonary goes.  His arm   pain in the forearm poorly characterized.  He had a normal arm examination also   a normal vascular and neurologic.      TAHIR/ADOLFO  dd: 08/16/2017 13:18:19 (CDT)  td: 08/17/2017 00:46:05 (CDT)  Doc ID   #[id number]  Job ID #[id number]    CC:

## 2017-08-17 ENCOUNTER — OFFICE VISIT (OUTPATIENT)
Dept: CARDIOLOGY | Facility: CLINIC | Age: 22
End: 2017-08-17
Payer: COMMERCIAL

## 2017-08-17 ENCOUNTER — TELEPHONE (OUTPATIENT)
Dept: ENDOSCOPY | Facility: HOSPITAL | Age: 22
End: 2017-08-17

## 2017-08-17 ENCOUNTER — LAB VISIT (OUTPATIENT)
Dept: LAB | Facility: HOSPITAL | Age: 22
End: 2017-08-17
Payer: COMMERCIAL

## 2017-08-17 ENCOUNTER — OFFICE VISIT (OUTPATIENT)
Dept: GASTROENTEROLOGY | Facility: CLINIC | Age: 22
End: 2017-08-17
Payer: COMMERCIAL

## 2017-08-17 ENCOUNTER — TELEPHONE (OUTPATIENT)
Dept: PULMONOLOGY | Facility: CLINIC | Age: 22
End: 2017-08-17

## 2017-08-17 VITALS
WEIGHT: 139.13 LBS | HEIGHT: 73 IN | DIASTOLIC BLOOD PRESSURE: 79 MMHG | BODY MASS INDEX: 18.44 KG/M2 | HEART RATE: 84 BPM | SYSTOLIC BLOOD PRESSURE: 113 MMHG

## 2017-08-17 VITALS
BODY MASS INDEX: 18.46 KG/M2 | OXYGEN SATURATION: 98 % | SYSTOLIC BLOOD PRESSURE: 130 MMHG | DIASTOLIC BLOOD PRESSURE: 89 MMHG | WEIGHT: 139.31 LBS | HEIGHT: 73 IN | HEART RATE: 95 BPM

## 2017-08-17 DIAGNOSIS — R63.4 WEIGHT LOSS: ICD-10-CM

## 2017-08-17 DIAGNOSIS — R63.4 WEIGHT LOSS, ABNORMAL: ICD-10-CM

## 2017-08-17 DIAGNOSIS — R13.14 PHARYNGOESOPHAGEAL DYSPHAGIA: ICD-10-CM

## 2017-08-17 DIAGNOSIS — Q24.9 ADULT CONGENITAL HEART DISEASE: ICD-10-CM

## 2017-08-17 DIAGNOSIS — R10.13 EPIGASTRIC PAIN: ICD-10-CM

## 2017-08-17 DIAGNOSIS — R07.89 ATYPICAL CHEST PAIN: Primary | ICD-10-CM

## 2017-08-17 DIAGNOSIS — K21.9 GASTROESOPHAGEAL REFLUX DISEASE WITHOUT ESOPHAGITIS: ICD-10-CM

## 2017-08-17 DIAGNOSIS — R07.2 PRECORDIAL PAIN: ICD-10-CM

## 2017-08-17 LAB — IGA SERPL-MCNC: 191 MG/DL

## 2017-08-17 PROCEDURE — 3008F BODY MASS INDEX DOCD: CPT | Mod: S$GLB,,, | Performed by: PHYSICIAN ASSISTANT

## 2017-08-17 PROCEDURE — 82784 ASSAY IGA/IGD/IGG/IGM EACH: CPT

## 2017-08-17 PROCEDURE — 99214 OFFICE O/P EST MOD 30 MIN: CPT | Mod: S$GLB,,, | Performed by: INTERNAL MEDICINE

## 2017-08-17 PROCEDURE — 99999 PR PBB SHADOW E&M-EST. PATIENT-LVL III: CPT | Mod: PBBFAC,,, | Performed by: INTERNAL MEDICINE

## 2017-08-17 PROCEDURE — 3008F BODY MASS INDEX DOCD: CPT | Mod: S$GLB,,, | Performed by: INTERNAL MEDICINE

## 2017-08-17 PROCEDURE — 36415 COLL VENOUS BLD VENIPUNCTURE: CPT

## 2017-08-17 PROCEDURE — 99204 OFFICE O/P NEW MOD 45 MIN: CPT | Mod: S$GLB,,, | Performed by: PHYSICIAN ASSISTANT

## 2017-08-17 PROCEDURE — 86677 HELICOBACTER PYLORI ANTIBODY: CPT

## 2017-08-17 PROCEDURE — 99999 PR PBB SHADOW E&M-EST. PATIENT-LVL IV: CPT | Mod: PBBFAC,,, | Performed by: PHYSICIAN ASSISTANT

## 2017-08-17 PROCEDURE — 83516 IMMUNOASSAY NONANTIBODY: CPT

## 2017-08-17 RX ORDER — ESOMEPRAZOLE MAGNESIUM 40 MG/1
40 CAPSULE, DELAYED RELEASE ORAL
Qty: 60 CAPSULE | Refills: 3 | Status: SHIPPED | OUTPATIENT
Start: 2017-08-17 | End: 2017-09-08 | Stop reason: SDUPTHER

## 2017-08-17 NOTE — LETTER
August 17, 2017      Darlin Fabian MD  [address] [facility] [address]           [facility] - Gastroenterology  [address]  [facility] [address]  Phone: [phone]  Fax: [phone]          Patient: Darci Vaughan Jr.   MR Number: [id number]   YOB: 1995   Date of Visit: 8/17/2017       Dear Dr. Darlin Fabian:    Thank you for referring Darci Vaughan to me for evaluation. Attached you will find relevant portions of my assessment and plan of care.    If you have questions, please do not hesitate to call me. I look forward to following Darci Vaughan along with you.    Sincerely,    Esteban Shah PA-C    Enclosure  CC:  No Recipients    If you would like to receive this communication electronically, please contact [email or web address] or [phone] to request more information on [employer] Link access.    For providers and/or their staff who would like to refer a patient to [facility], please contact us through our one-stop-shop provider referral line, [facility] [place], at [phone].    If you feel you have received this communication in error or would no longer like to receive these types of communications, please e-mail [email or web address]

## 2017-08-17 NOTE — PROGRESS NOTES
Subjective:   Patient ID:  Darci Vaughan Jr. is a 21 y.o. male who presents for follow-up of Chest Pain      HPI Patient is seen in our [facility].  He is here for follow up of chest pains. Since his last visit with us he underwent treadmill and Holter monitor. Results reviewed and reported as below. He is here with his grandmother whom he lives in.      Congenital Heart History:  none     Other Medical problems:  1. History of Pneumothorax x 2 with Blebectomy in 2012 (chest x ray looks normal)        Cardiac Testing:  Holter   No arrhythmias; during symptoms of chest pains. H Rhythm was sinus with HR of 102-112bpm    Treadmill 7/21/2017  11 Mets; no chest pains; peak HR of 75%; No arrythmias    Echo cardiogram 7/19/2017  CONCLUSIONS     1 - Normal left ventricular systolic function (EF 55-60%).     2 - Normal left ventricular diastolic function.     3 - Normal right ventricular systolic function .     4 - Normal size proximal aorta.   Other testing:  Brain MRI: no vascular abnormalities  Xray 7/17/2017: no scoliosis  Review of Systems   Constitution: Positive for weight loss. Negative for weight gain.        68 top 63 kg.   HENT: Positive for headaches.    Eyes: Negative for blurred vision.   Cardiovascular: Positive for chest pain. Negative for claudication, cyanosis, dyspnea on exertion, irregular heartbeat, leg swelling, near-syncope, orthopnea, palpitations, paroxysmal nocturnal dyspnea and syncope.   Respiratory: Negative for cough.    Endocrine: Negative for polydipsia and polyuria.   Hematologic/Lymphatic: Does not bruise/bleed easily.   Skin: Negative for color change and dry skin.   Musculoskeletal: Positive for back pain. Negative for falls, muscle cramps, muscle weakness and myalgias.        Arm pain   Gastrointestinal: Positive for heartburn. Negative for abdominal pain, change in bowel habit, hematemesis and hematochezia.   Neurological: Negative for brief paralysis,  "focal weakness, light-headedness and paresthesias.   Psychiatric/Behavioral: Positive for depression. Negative for altered mental status.   Allergic/Immunologic: Negative for environmental allergies.         Allergies and current medications updated and reviewed:  Review of patient's allergies indicates:  No Known Allergies  Current Outpatient Prescriptions   Medication Sig Dispense Refill    albuterol 90 mcg/actuation inhaler Inhale 1 puff into the lungs every 12 (twelve) hours. And as needed for symptoms of wheezing and sob 1 Inhaler 5    esomeprazole (NEXIUM) 40 MG capsule Take 1 capsule (40 mg total) by mouth before breakfast. 30 capsule 11    sucralfate (CARAFATE) 1 gram tablet Take 1 tablet (1 g total) by mouth 4 (four) times daily. 60 tablet 0     No current facility-administered medications for this visit.        Objective:   Right Arm BP - Sittin/80 (17 0811)  Left Arm BP - Sittin/89 (17 0811)  /89 (BP Location: Left arm, Patient Position: Sitting, BP Method: Large (Automatic))   Pulse 95   Ht 6' 1" (1.854 m)   Wt 63.2 kg (139 lb 5.3 oz)   SpO2 98%   BMI 18.38 kg/m²     Body surface area is 1.8 meters squared.  Physical Exam   Constitutional: He is oriented to person, place, and time. Vital signs are normal. He appears well-developed and well-nourished.   HENT:   Mouth/Throat: Uvula is midline and mucous membranes are normal. Mucous membranes are not pale, not dry and not cyanotic.   Eyes: Conjunctivae and lids are normal.   Neck: Neck supple. No hepatojugular reflux and no JVD present. Carotid bruit is not present. No thyromegaly present.   Cardiovascular: Regular rhythm, S1 normal and S2 normal.  Tachycardia present.  PMI is not displaced.    No murmur heard.  Pulses:       Carotid pulses are 2+ on the right side, and 2+ on the left side.       Radial pulses are 2+ on the right side, and 2+ on the left side.        Posterior tibial pulses are 2+ on the right side, and " 2+ on the left side.   Pulmonary/Chest: Effort normal and breath sounds normal.   No kyphosis or scloiosis   Abdominal: Normal appearance and bowel sounds are normal. There is no hepatomegaly. There is no tenderness.   Musculoskeletal:        Right upper leg: He exhibits no edema.        Left upper leg: He exhibits no edema.        Right lower leg: He exhibits no edema.        Left lower leg: He exhibits no edema.   Yes limitations in exercise ability.      Neurological: He is alert and oriented to person, place, and time. He has normal strength.   Skin: Skin is warm, dry and intact. No rash noted. No cyanosis. Nails show no clubbing.   Psychiatric: He has a normal mood and affect. His speech is normal and behavior is normal.       Chemistry        Component Value Date/Time     07/17/2017 2320    K 3.8 07/17/2017 2320     07/17/2017 2320    CO2 27 07/17/2017 2320    BUN 16 07/17/2017 2320    CREATININE 1.1 07/17/2017 2320    GLU 95 07/17/2017 2320        Component Value Date/Time    CALCIUM 9.5 07/17/2017 2320    ALKPHOS 78 07/17/2017 2320    AST 15 07/17/2017 2320    ALT 15 07/17/2017 2320    BILITOT 0.3 07/17/2017 2320    ESTGFRAFRICA >60.0 07/17/2017 2320    EGFRNONAA >60.0 07/17/2017 2320              Recent Labs  Lab 07/08/17  0904 07/13/17  1536   WHITE BLOOD CELL COUNT  --  5.24   HEMOGLOBIN  --  15.5   HEMATOCRIT  --  47.2   MCV  --  83   PLATELETS  --  263   TSH 2.069  --    CHOLESTEROL 183  --    HDL 41  --    LDL CHOLESTEROL 133.8  --    TRIGLYCERIDES 41  --    HDL/CHOLESTEROL RATIO 22.4  --                 Test(s) Reviewed  I have reviewed the following in detail:  [x] Stress test no ischemia   [] Angiography   [x] Echocardiogram - images reviewed no abnoramlitie=y   [x] Labs   [] Other:         Assessment/Plan:   Adult congenital heart disease  He does not have Marfan's or congenital heart disease    Precordial pain  The naproxen did not help his chest pains. He still has mid sternal chest  pains along the length of his sternum. They can come at rest or with eating. He states he has lost 10 lbs as he does not eat because he hurts. He needs to see GI sooner than Sept 5 as he most likely needs an EGD. He has had multiple ER visit, clinic visits for this chest pain.     Also asked if he was sad and depressed, the is set up to see a counselor in the next few weeks here.     Weight loss, abnormal  He feels it is secondary to pain when eating      No orders of the defined types were placed in this encounter.    Recommend follow up with PCP - there is concern for depression.    Return if any new symptoms.

## 2017-08-17 NOTE — ASSESSMENT & PLAN NOTE
The naproxen did not help his chest pains. He still has mid sternal chest pains along the length of his sternum. They can come at rest or with eating. He states he has lost 10 lbs as he does not eat because he hurts. He needs to see GI sooner than Sept 5 as he most likely needs an EGD. He has had multiple ER visit, clinic visits for this chest pain.     Also asked if he was sad and depressed, the is set up to see a counselor in the next few weeks here.

## 2017-08-18 LAB — TTG IGA SER IA-ACNC: 6 UNITS

## 2017-08-21 ENCOUNTER — HOSPITAL ENCOUNTER (OUTPATIENT)
Dept: RADIOLOGY | Facility: HOSPITAL | Age: 22
Discharge: HOME | End: 2017-08-21
Attending: PHYSICIAN ASSISTANT
Payer: COMMERCIAL

## 2017-08-21 DIAGNOSIS — R10.13 EPIGASTRIC PAIN: ICD-10-CM

## 2017-08-21 LAB — H PYLORI IGG SERPL QL IA: NEGATIVE

## 2017-08-21 PROCEDURE — 76700 US EXAM ABDOM COMPLETE: CPT | Mod: TC

## 2017-08-21 PROCEDURE — 76700 US EXAM ABDOM COMPLETE: CPT | Mod: 26,,, | Performed by: RADIOLOGY

## 2017-08-22 ENCOUNTER — OFFICE VISIT (OUTPATIENT)
Dept: PSYCHIATRY | Facility: CLINIC | Age: 22
End: 2017-08-22
Payer: COMMERCIAL

## 2017-08-22 DIAGNOSIS — F41.1 GENERALIZED ANXIETY DISORDER: Primary | ICD-10-CM

## 2017-08-22 DIAGNOSIS — F32.89 DEPRESSIVE DISORDER, NOT ELSEWHERE CLASSIFIED: ICD-10-CM

## 2017-08-22 DIAGNOSIS — F41.0 PANIC DISORDER: ICD-10-CM

## 2017-08-22 PROCEDURE — 90791 PSYCH DIAGNOSTIC EVALUATION: CPT | Mod: S$GLB,,, | Performed by: SOCIAL WORKER

## 2017-08-22 NOTE — TELEPHONE ENCOUNTER
Returned patient's phone call.  Left a message explaining that I will call him again later this week, or if he prefer, he can email specific questions via [facility].

## 2017-08-22 NOTE — PROGRESS NOTES
"Psychiatry Initial Visit (PhD/LCSW)  Diagnostic Interview - CPT 04243    Date: 8/22/2017    Site: [facility]    Referral source: self referral     Clinical status of patient: Outpatient    Darci Vaughan Jr., a 21 y.o. male, for initial evaluation visit.  Met with patient.    Chief complaint/reason for encounter: depression and anxiety    History of present illness: Patient presents today due to anxiety and depression.  He recalls symptoms beginning 2 years ago.  He describes lack of motivation, no energy, fatigue, no interest, tearfulness, occasional hypersomnia (but on average, sleeps 7 hours per night), decreased appetite for the past couple months, with weight loss of 10 lbs, worthlessness, and poor concentration.  He feels hopeless, "sometimes."  Patient describes increased anxiety over the past couple months, but cannot cite any triggers.  He notes excessive worries about his health and his future - worries a lot about dying.  Review of the records shows patient has been seeking medical attention frequently within the past couple months.  He doesn't confide in anyone about issues, not even parents - "Sometimes they don't take me serious."  Patient denies having friends - describes self as someone who likes to keep to himself.  He doesn't feel he can trust anyone, and cannot cite a reason - denies being lied to or taken advantage of previously.  He denies social anxiety. He denies hx abuse or trauma.  Depression and anxiety have been interfering with his ability to work.  He hasn't worked consistently since June, but notes he still has his position.  He denies any issues at work - was able to perform duties and got along well with co-workers.  Patient says he is willing to attend therapy and wants help.             Pain: 0    Symptoms:   · Mood: depressed mood, diminished interest, weight loss, hypersomnia, fatigue, worthlessness/guilt, poor concentration, tearfulness and social isolation  · Anxiety: " excessive anxiety/worry, restlessness/keyed up, irritability, muscle tension and panic attacks - began within the past couple months at work - with SOB, rapid heart rate, dizziness, and impending sense of doom   · Substance abuse: denied  · Cognitive functioning: denied  · Health behaviors: GERD, asthma     Psychiatric history: none; denies hx psych hospitalizations; denies hx psych medications; denies hx counseling; denies hx SA and SI - past and present; denies access to firearms; denies hx learning disabilities; denies hx ADHD; denies hx migdalia; denies hx psychosis; denies hx OCD symptoms       Medical history: GERD, asthma     Family history of psychiatric illness: cousin with mental illness, but uncertain of diagnosis     Social history (marriage, employment, etc.): Currently lives with paternal grandmother for the past year.  He was living with his mother, but moved to grandmother's home, because closer to his job - works at [employer], stocking shelves - for the past 9 months.  Patient graduated from [employer].  Patient is single and does not have any children.  Patient has two younger sisters.  Parents are .  Both parents are involved.      Substance use:   Alcohol: denied   Drugs: denied   Tobacco: none   Caffeine: none    Current medications and drug reactions (include OTC, herbal): see medication list; nexium and inhaler; denies hx psych medications     Strengths and liabilities: Strength: Patient accepts guidance/feedback, Strength: Patient is expressive/articulate., Liability: Patient has limited suport network., Liability: Patient lacks coping skills.    Current Evaluation:     Mental Status Exam:  General Appearance:  age appropriate, well nourished, casually dressed   Speech: normal tone, normal rate, soft      Level of Cooperation: guarded      Thought Processes: normal and logical   Mood: anxious, depressed      Thought Content: normal, no suicidality, no homicidality, delusions, or  "paranoia   Affect: congruent and appropriate   Orientation: Oriented x3   Memory: recent >  intact, remote >  intact   Attention Span & Concentration: spelled "WORLD" forwards and backwards   Fund of General Knowledge: intact and appropriate to age and level of education   Abstract Reasoning: not assessed   Judgment & Insight: fair     Language  intact     Diagnostic Impression - Plan:       ICD-10-CM ICD-9-CM   1. Generalized anxiety disorder F41.1 300.02   2. Panic disorder F41.0 300.01   3. Depressive disorder, not elsewhere classified F32.9 311       Plan:individual psychotherapy and consult psychiatrist for medication evaluation    Return to Clinic: 2-3 weeks    Length of Service (minutes): 45  "

## 2017-08-23 PROBLEM — F41.1 GENERALIZED ANXIETY DISORDER: Status: ACTIVE | Noted: 2017-08-23

## 2017-08-23 PROBLEM — F32.89 DEPRESSIVE DISORDER, NOT ELSEWHERE CLASSIFIED: Status: ACTIVE | Noted: 2017-08-23

## 2017-08-23 PROBLEM — F41.0 PANIC DISORDER: Status: ACTIVE | Noted: 2017-08-23

## 2017-08-27 ENCOUNTER — PATIENT MESSAGE (OUTPATIENT)
Dept: GASTROENTEROLOGY | Facility: CLINIC | Age: 22
End: 2017-08-27

## 2017-08-30 ENCOUNTER — HOSPITAL ENCOUNTER (OUTPATIENT)
Facility: HOSPITAL | Age: 22
Discharge: HOME | End: 2017-08-30
Attending: INTERNAL MEDICINE | Admitting: INTERNAL MEDICINE
Payer: COMMERCIAL

## 2017-08-30 ENCOUNTER — ANESTHESIA (OUTPATIENT)
Dept: ENDOSCOPY | Facility: HOSPITAL | Age: 22
End: 2017-08-30
Payer: COMMERCIAL

## 2017-08-30 ENCOUNTER — ANESTHESIA EVENT (OUTPATIENT)
Dept: ENDOSCOPY | Facility: HOSPITAL | Age: 22
End: 2017-08-30
Payer: COMMERCIAL

## 2017-08-30 ENCOUNTER — SURGERY (OUTPATIENT)
Age: 22
End: 2017-08-30

## 2017-08-30 VITALS
OXYGEN SATURATION: 100 % | SYSTOLIC BLOOD PRESSURE: 118 MMHG | RESPIRATION RATE: 16 BRPM | BODY MASS INDEX: 17.71 KG/M2 | TEMPERATURE: 98 F | HEART RATE: 88 BPM | RESPIRATION RATE: 21 BRPM | HEIGHT: 74 IN | DIASTOLIC BLOOD PRESSURE: 81 MMHG | WEIGHT: 138 LBS

## 2017-08-30 DIAGNOSIS — F32.89 DEPRESSIVE DISORDER, NOT ELSEWHERE CLASSIFIED: Primary | ICD-10-CM

## 2017-08-30 PROCEDURE — D9220A PRA ANESTHESIA: Mod: ANES,,, | Performed by: ANESTHESIOLOGY

## 2017-08-30 PROCEDURE — 25000003 PHARM REV CODE 250: Performed by: NURSE ANESTHETIST, CERTIFIED REGISTERED

## 2017-08-30 PROCEDURE — 37000009 HC ANESTHESIA EA ADD 15 MINS: Performed by: INTERNAL MEDICINE

## 2017-08-30 PROCEDURE — 88305 TISSUE EXAM BY PATHOLOGIST: CPT | Performed by: PATHOLOGY

## 2017-08-30 PROCEDURE — 43239 EGD BIOPSY SINGLE/MULTIPLE: CPT | Mod: ,,, | Performed by: INTERNAL MEDICINE

## 2017-08-30 PROCEDURE — D9220A PRA ANESTHESIA: Mod: CRNA,,, | Performed by: NURSE ANESTHETIST, CERTIFIED REGISTERED

## 2017-08-30 PROCEDURE — 88305 TISSUE EXAM BY PATHOLOGIST: CPT | Mod: 26,,, | Performed by: PATHOLOGY

## 2017-08-30 PROCEDURE — 63600175 PHARM REV CODE 636 W HCPCS: Performed by: NURSE ANESTHETIST, CERTIFIED REGISTERED

## 2017-08-30 PROCEDURE — 25000003 PHARM REV CODE 250: Performed by: INTERNAL MEDICINE

## 2017-08-30 PROCEDURE — 43239 EGD BIOPSY SINGLE/MULTIPLE: CPT | Performed by: INTERNAL MEDICINE

## 2017-08-30 PROCEDURE — 37000008 HC ANESTHESIA 1ST 15 MINUTES: Performed by: INTERNAL MEDICINE

## 2017-08-30 PROCEDURE — 27201012 HC FORCEPS, HOT/COLD, DISP: Performed by: INTERNAL MEDICINE

## 2017-08-30 RX ORDER — GLYCOPYRROLATE 0.2 MG/ML
INJECTION INTRAMUSCULAR; INTRAVENOUS
Status: DISCONTINUED | OUTPATIENT
Start: 2017-08-30 | End: 2017-08-30

## 2017-08-30 RX ORDER — PROPOFOL 10 MG/ML
VIAL (ML) INTRAVENOUS CONTINUOUS PRN
Status: DISCONTINUED | OUTPATIENT
Start: 2017-08-30 | End: 2017-08-30

## 2017-08-30 RX ORDER — LIDOCAINE HCL/PF 100 MG/5ML
SYRINGE (ML) INTRAVENOUS
Status: DISCONTINUED | OUTPATIENT
Start: 2017-08-30 | End: 2017-08-30

## 2017-08-30 RX ORDER — SODIUM CHLORIDE 9 MG/ML
INJECTION, SOLUTION INTRAVENOUS CONTINUOUS
Status: DISCONTINUED | OUTPATIENT
Start: 2017-08-30 | End: 2017-08-30 | Stop reason: HOSPADM

## 2017-08-30 RX ORDER — PROPOFOL 10 MG/ML
VIAL (ML) INTRAVENOUS
Status: DISCONTINUED | OUTPATIENT
Start: 2017-08-30 | End: 2017-08-30

## 2017-08-30 RX ADMIN — PROPOFOL 30 MG: 10 INJECTION, EMULSION INTRAVENOUS at 02:08

## 2017-08-30 RX ADMIN — LIDOCAINE HYDROCHLORIDE 100 MG: 20 INJECTION, SOLUTION INTRAVENOUS at 01:08

## 2017-08-30 RX ADMIN — PROPOFOL 100 MCG/KG/MIN: 10 INJECTION, EMULSION INTRAVENOUS at 02:08

## 2017-08-30 RX ADMIN — PROPOFOL 150 MG: 10 INJECTION, EMULSION INTRAVENOUS at 01:08

## 2017-08-30 RX ADMIN — GLYCOPYRROLATE 0.2 MG: 0.2 INJECTION, SOLUTION INTRAMUSCULAR; INTRAVENOUS at 01:08

## 2017-08-30 RX ADMIN — SODIUM CHLORIDE: 0.9 INJECTION, SOLUTION INTRAVENOUS at 01:08

## 2017-08-30 RX ADMIN — PROPOFOL 30 MG: 10 INJECTION, EMULSION INTRAVENOUS at 01:08

## 2017-08-30 RX ADMIN — PROPOFOL 20 MG: 10 INJECTION, EMULSION INTRAVENOUS at 01:08

## 2017-08-30 NOTE — TRANSFER OF CARE
"Anesthesia Transfer of Care Note    Patient: Darci Vaughan Jr.    Procedure(s) Performed: Procedure(s) (LRB):  ESOPHAGOGASTRODUODENOSCOPY (EGD) (N/A)    Patient location: GI    Anesthesia Type: general    Transport from OR: Transported from OR on room air with adequate spontaneous ventilation    Post pain: adequate analgesia    Post assessment: no apparent anesthetic complications and tolerated procedure well    Post vital signs: stable    Level of consciousness: awake    Nausea/Vomiting: no nausea/vomiting    Complications: none    Transfer of care protocol was followed      Last vitals:   Visit Vitals  /74 (BP Location: Left arm, Patient Position: Lying)   Pulse 108   Temp 37.2 °C (99 °F) (Other (see comments))   Resp 16   Ht 6' 2" (1.88 m)   Wt 62.6 kg (138 lb)   SpO2 98%   BMI 17.72 kg/m²     "

## 2017-08-30 NOTE — ANESTHESIA RELEASE NOTE
"Anesthesia Release from PACU Note    Patient: Darci Vaughan Jr.    Procedure(s) Performed: Procedure(s) (LRB):  ESOPHAGOGASTRODUODENOSCOPY (EGD) (BN/A)    Anesthesia type: general    Post pain: Adequate analgesia    Post assessment: no apparent anesthetic complications and tolerated procedure well    Last Vitals:   Visit Vitals  /81 (BP Location: Left arm, Patient Position: Lying)   Pulse 88   Temp 36.8 °C (98.2 °F) (Temporal)   Resp 16   Ht 6' 2" (1.88 m)   Wt 62.6 kg (138 lb)   SpO2 100%   BMI 17.72 kg/m²       Post vital signs: stable    Level of consciousness: awake and alert     Nausea/Vomiting: no nausea/no vomiting    Complications: none    Airway Patency: patent    Respiratory: unassisted, spontaneous ventilation, room air    Cardiovascular: stable and blood pressure at baseline    Hydration: euvolemic  "

## 2017-08-30 NOTE — ANESTHESIA POSTPROCEDURE EVALUATION
"Anesthesia Post Evaluation    Patient: Darci Vaughan Jr.    Procedure(s) Performed: Procedure(s) (LRB):  ESOPHAGOGASTRODUODENOSCOPY (EGD) (N/A)    Final Anesthesia Type: general  Patient location during evaluation: GI PACU  Patient participation: Yes- Able to Participate  Level of consciousness: awake and alert  Post-procedure vital signs: reviewed and stable  Pain management: adequate  Airway patency: patent  PONV status at discharge: No PONV  Anesthetic complications: no      Cardiovascular status: hemodynamically stable  Respiratory status: unassisted, spontaneous ventilation and room air  Hydration status: euvolemic  Follow-up not needed.        Visit Vitals  /81 (BP Location: Left arm, Patient Position: Lying)   Pulse 88   Temp 36.8 °C (98.2 °F) (Temporal)   Resp 16   Ht 6' 2" (1.88 m)   Wt 62.6 kg (138 lb)   SpO2 100%   BMI 17.72 kg/m²       Pain/Shelia Score: Pain Assessment Performed: Yes (8/30/2017  2:37 PM)  Presence of Pain: denies (8/30/2017  2:37 PM)  Pain Rating Prior to Med Admin: 0 (8/30/2017  2:25 PM)  Shelia Score: 10 (8/30/2017  2:10 PM)      "

## 2017-08-30 NOTE — H&P (VIEW-ONLY)
[facility] Consultation Note    Reason for Consult:  The primary encounter diagnosis was Atypical chest pain. Diagnoses of Gastroesophageal reflux disease without esophagitis, Pharyngoesophageal dysphagia, Epigastric pain, and Weight loss were also pertinent to this visit.    PCP:   Anita Rodrigues   [address] / [place] [address]    Referring MD:  Darlin Fabian Md  [address] [place] [address]    HPI:  This is a 21 y.o. male referred by Dr. Darlin Fabian for evaluation of weight loss    Has lost 10lbs in 2 months  Taking nexium 3 weeks, helped at first but  No longer  GERD x few yrs  Chest pain with meals  Frequent dysphagia of foods and pills  Poor appetite x few months    BM once or twice a week  Having LUQ and RUQ pain, sharp, 8/10, Last minutes, occurring once or twice a week  Worsened with meals    No relief with zantac and tums  No nausea or vomiting  No black stools or rectal bleeding    ROS:  Constitutional: No fevers, chills, + weight loss  ENT: No allergies  CV: No chest pain  Pulm: No cough, No shortness of breath  Ophtho: No vision changes  GI: see HPI  Derm: No rash  Heme: No lymphadenopathy, No bruising  MSK: No arthritis  : No dysuria, No hematuria  Endo: No hot or cold intolerance  Neuro: No syncope, No seizure  Psych: No anxiety, No depression    Medical History:  has a past medical history of Anxiety; Atelectasis; and Spontaneous pneumothorax (10/2012).    Surgical History:  has a past surgical history that includes Chest tube insertion and BLEBECTOMY (Dec 2012).    Family History: family history includes Allergies in his sister; Asthma in his father and mother; Cancer in his maternal grandmother; Emphysema in his maternal grandfather; Migraines in his father and sister; Seizures in his sister..     Social History:  reports that he is a non-smoker but has been exposed to tobacco smoke. He has quit using smokeless tobacco. He reports that he  "does not drink alcohol or use drugs.    Review of patient's allergies indicates:  No Known Allergies    Current Outpatient Prescriptions on File Prior to Visit   Medication Sig Dispense Refill    albuterol 90 mcg/actuation inhaler Inhale 1 puff into the lungs every 12 (twelve) hours. And as needed for symptoms of wheezing and sob 1 Inhaler 5    [DISCONTINUED] esomeprazole (NEXIUM) 40 MG capsule Take 1 capsule (40 mg total) by mouth before breakfast. 30 capsule 11    sucralfate (CARAFATE) 1 gram tablet Take 1 tablet (1 g total) by mouth 4 (four) times daily. 60 tablet 0     No current facility-administered medications on file prior to visit.          Objective Findings:    Vital Signs:  /79   Pulse 84   Ht 6' 1" (1.854 m)   Wt 63.1 kg (139 lb 1.8 oz)   BMI 18.35 kg/m²   Body mass index is 18.35 kg/m².    Physical Exam:  General Appearance: Well appearing in no acute distress  Head:   Normocephalic, without obvious abnormality  Eyes:    No scleral icterus  ENT: Neck supple, Lips, mucosa, and tongue normal  Lungs: CTA bilaterally in anterior and posterior fields, no wheezes, no crackles.  Heart:  Regular rate and rhythm, S1, S2 normal, no murmurs heard  Abdomen: Soft, epigastric tenderness, non distended with positive bowel sounds in all four quadrants.   Extremities: no edema  Skin: No rash  Neurologic: AAO x 3      Labs:  Lab Results   Component Value Date    WBC 5.24 07/13/2017    HGB 15.5 07/13/2017    HCT 47.2 07/13/2017     07/13/2017    CHOL 183 07/08/2017    TRIG 41 07/08/2017    HDL 41 07/08/2017    ALT 15 07/17/2017    AST 15 07/17/2017     07/17/2017    K 3.8 07/17/2017     07/17/2017    CREATININE 1.1 07/17/2017    BUN 16 07/17/2017    CO2 27 07/17/2017    TSH 2.069 07/08/2017    INR 1.0 01/05/2014     Lab Results   Component Value Date    LIPASE 40 07/17/2017       Imaging:    Endoscopy:    None    Assessment:  1. Atypical chest pain    2. Gastroesophageal reflux disease " without esophagitis    3. Pharyngoesophageal dysphagia    4. Epigastric pain    5. Weight loss       22yo male with long Hx of GERD, presents with 10lb weight loss in 2 months, atypical chest pain, dysphagia, and epi pain.    Recommendations:  1. Labs to rule out h. Pylori and celiac    2. abd us to rule out gallstones    3. Increase nexium to 40mg daily    4. Schedule EGD to rule out ulcers, Porter's and malignancies, consider bx for eosinophilic esophagitis    5. GERD diet    Underlying constiaption, consider addressing if EGD unremarkable    Return in about 3 months (around 11/17/2017).      Order summary:  Orders Placed This Encounter    US Abdomen Complete    H. PYLORI ANTIBODY, IGG    Tissue transglutaminase, IgA    IgA    esomeprazole (NEXIUM) 40 MG capsule    Case request GI: ESOPHAGOGASTRODUODENOSCOPY (EGD)         Thank you so much for allowing me to participate in the care of Darci Shah PA-C

## 2017-08-30 NOTE — DISCHARGE INSTRUCTIONS

## 2017-08-30 NOTE — PLAN OF CARE
Pt felt a little hot and flush after the IV was started. We lowered the head of the bed and put a cold rag on pt's head. Afterward pt states he feels better

## 2017-08-30 NOTE — PATIENT INSTRUCTIONS
Discharge Summary/Instructions after an Endoscopic Procedure  Patient Name: Darci Vaughan  Patient MRN: [id number]  Patient YOB: 1995 Wednesday, August 30, 2017  Gómez Woodward MD  RESTRICTIONS:  During your procedure today, you received medications for sedation.  These   medications may affect your judgment, balance and coordination.  Therefore,   for 24 hours, you have the following restrictions:   - DO NOT drive a car, operate machinery, make legal/financial decisions,   sign important papers or drink alcohol.    ACTIVITY:  The following day: return to full activity including work, except no heavy   lifting, straining or running for 3 days if polyps were removed.  DIET:  Eat and drink normally unless instructed otherwise.  TREATMENT FOR COMMON SIDE EFFECTS:  - Mild abdominal pain, belching, bloating or excessive gas: rest, eat   lightly and use a heating pad.  - Sore Throat: treat with throat lozenges and/or gargle with warm salt   water.  SYMPTOMS TO WATCH FOR AND REPORT TO YOUR PHYSICIAN:  1. Abdominal pain or bloating, other than gas cramps.  2. Chest pain.  3. Back pain.  4. Chills or fever occurring within 24 hours after the procedure.  5. Rectal bleeding, which would show as bright red, maroon, or black stools.   (A tablespoon of blood from the rectum is not serious, especially if   hemorrhoids are present.)  6. Vomiting.  7. Weakness or dizziness.  8. Because air was used during the procedure, expelling large amounts of air   from your rectum or belching is normal.  9. If a bowel prep was taken, you may not have a bowel movement for 1-3   days.  This is normal.  GO DIRECTLY TO THE EMERGENCY ROOM IF YOU HAVE ANY OF THE FOLLOWING:   Difficulty breathing   Chills and/or fever over 101 F   Persistent vomiting and/or vomiting blood   Severe abdominal pain   Severe chest pain   Black, tarry stools   Bleeding- more than one tablespoon  Your doctor recommends these additional instructions:  If any  biopsies were taken, your doctors clinic will call you in 1 to 2   weeks with any results.  You have a contact number available for emergencies.  The signs and symptoms   of potential delayed complications were discussed with you.  You may return   to normal activities tomorrow.  Written discharge instructions were   provided to you.   You are being discharged to home.   We are waiting for your pathology results.   Return to your physician assistant as previously scheduled.   The findings and recommendations were discussed with your designated   responsible adult.  For questions, problems or results please call your physician - Gómez Woodward MD at Work:  [phone].  [facility] [place], EMERGENCY ROOM PHONE NUMBER: [phone]  IF A COMPLICATION OR EMERGENCY SITUATION ARISES AND YOU ARE UNABLE TO REACH   YOUR PHYSICIAN - GO DIRECTLY TO THE EMERGENCY ROOM.  Gómez Woodward MD  8/30/2017 2:12:24 PM  This report has been verified and signed electronically.

## 2017-08-30 NOTE — INTERVAL H&P NOTE
The patient has been examined and the H&P has been reviewed:    I concur with the findings and no changes have occurred since H&P was written.     History and Exam unchanged from visit.    Procedure - EGD  Neck - supple  Plan of anesthesia - General  ASA - per anesthesia  Mallampati - per anesthesia  Anesthesia problems - no  Family history of anesthesia problems - no      Anesthesia/Surgery risks, benefits and alternative options discussed and understood by patient/family.          Active Hospital Problems    Diagnosis  POA    Depressive disorder, not elsewhere classified [F32.9]  Yes      Resolved Hospital Problems    Diagnosis Date Resolved POA   No resolved problems to display.

## 2017-08-30 NOTE — ANESTHESIA PREPROCEDURE EVALUATION
08/30/2017  Darci Vaughan Jr. is a 21 y.o., male.    Anesthesia Evaluation    I have reviewed the Patient Summary Reports.    I have reviewed the Nursing Notes.   I have reviewed the Medications.     Review of Systems  Anesthesia Hx:  No problems with previous Anesthesia  History of prior surgery of interest to airway management or planning: lung surgery. Previous anesthesia: General VATS 2012 with general anesthesia.  Denies Family Hx of Anesthesia complications.   Denies Personal Hx of Anesthesia complications.   Social:  Non-Smoker    Cardiovascular:   Exercise tolerance: good Denies Hypertension.  Denies CAD.    No h/o CHD, recent cardiac w/u for CP negative   Pulmonary:   Spontaneous PTX 2012, s/p chest tube, VATS   Renal/:  Renal/ Normal     Hepatic/GI:   GERD    Neurological:  Neurology Normal    Endocrine:  Endocrine Normal        Physical Exam  General:  Well nourished    Airway/Jaw/Neck:  Airway Findings: Mouth Opening: Normal Tongue: Normal  Mallampati: III  Improves to II with phonation.  TM Distance: Normal, at least 6 cm  Jaw/Neck Findings:  Neck ROM: Normal ROM      Dental:  Dental Findings: In tact        Mental Status:  Mental Status Findings:  Cooperative, Alert and Oriented         Anesthesia Plan  Type of Anesthesia, risks & benefits discussed:  Anesthesia Type:  general  Patient's Preference:   Intra-op Monitoring Plan:   Intra-op Monitoring Plan Comments:   Post Op Pain Control Plan:   Post Op Pain Control Plan Comments:   Induction:   IV  Beta Blocker:  Patient is not currently on a Beta-Blocker (No further documentation required).       Informed Consent: Patient understands risks and agrees with Anesthesia plan.  Questions answered. Anesthesia consent signed with patient.  ASA Score: 1     Day of Surgery Review of History & Physical:    H&P update referred to the surgeon.          Ready For Surgery From Anesthesia Perspective.

## 2017-09-05 ENCOUNTER — OFFICE VISIT (OUTPATIENT)
Dept: PULMONOLOGY | Facility: CLINIC | Age: 22
End: 2017-09-05
Payer: COMMERCIAL

## 2017-09-05 ENCOUNTER — PATIENT MESSAGE (OUTPATIENT)
Dept: GASTROENTEROLOGY | Facility: CLINIC | Age: 22
End: 2017-09-05

## 2017-09-05 VITALS
BODY MASS INDEX: 17.71 KG/M2 | OXYGEN SATURATION: 99 % | HEIGHT: 74 IN | HEART RATE: 82 BPM | DIASTOLIC BLOOD PRESSURE: 77 MMHG | SYSTOLIC BLOOD PRESSURE: 116 MMHG | WEIGHT: 138 LBS

## 2017-09-05 DIAGNOSIS — J45.30 MILD PERSISTENT ASTHMA WITHOUT COMPLICATION: ICD-10-CM

## 2017-09-05 DIAGNOSIS — R09.1 PLEURISY: Primary | ICD-10-CM

## 2017-09-05 PROCEDURE — 99214 OFFICE O/P EST MOD 30 MIN: CPT | Mod: S$GLB,,, | Performed by: INTERNAL MEDICINE

## 2017-09-05 PROCEDURE — 3008F BODY MASS INDEX DOCD: CPT | Mod: S$GLB,,, | Performed by: INTERNAL MEDICINE

## 2017-09-05 PROCEDURE — 99999 PR PBB SHADOW E&M-EST. PATIENT-LVL III: CPT | Mod: PBBFAC,,, | Performed by: INTERNAL MEDICINE

## 2017-09-05 RX ORDER — BUDESONIDE AND FORMOTEROL FUMARATE DIHYDRATE 80; 4.5 UG/1; UG/1
2 AEROSOL RESPIRATORY (INHALATION) EVERY 12 HOURS
Qty: 1 INHALER | Refills: 3 | Status: SHIPPED | OUTPATIENT
Start: 2017-09-05 | End: 2017-09-05 | Stop reason: ALTCHOICE

## 2017-09-05 NOTE — TELEPHONE ENCOUNTER
[facility]

## 2017-09-05 NOTE — PROGRESS NOTES
Subjective:       Patient ID: Darci Vaughan Jr. is a 21 y.o. male.    Chief Complaint: Chest Pain    HPI:   Darci Vaughan Jr. is a 21 y.o. male who initially presented with history of recurrent pneumothorax and concern for possible asthma.    Interval History:  At last visit a CT chest was ordered and the results are as follows:  CT chest 8/2/17: Relatively unremarkable.  Evidence of a left apical bullectomy.  No cystic lung disease.  No interstitial fibrosis.  No mediastinal or pneumoperitoneum.  As per radiology read no evidence of total pleurodesis.    Additionally, the patient was started on albuterol twice a day to decrease hyperinflation.  He took the albuterol a few times, but complained of chest pain with inhalation and discontinued it.  He has stopped working all together because he has pain in his chest when lifting anything 'heavy'.  Reports pleuritic chest pain with deep inspiration.  Now reporting SMITH when walking up an incline.  Denies associated wheezing.  He has quit playing basketball entirely.  He denies depression, but his affect is subdued.      Initial History:   In 2012, the patient was taking a bath when he noted a sharp pain on the left side of his chest that radiated upwards towards the shoulder.  He suddenly became short of breath.  [place] [facility] [place]aging showed you had a pneumothorax.  He received a small bore chest tube was place and he was admitted to the hospital.  His lung re-expanded, the tube was removed and he was discharged from the hospital.     Approximately 1 month later, he was at school- studying, not in any sports like activity- and similar symptoms recurred.  He was again taken to the ED, found to have a left sided pnx and chest tube was placed and he underwent VATS apical resection, blebectomy, and pleurodesis.      He has had no pneumothorax since that time, but reports that he has had similar symptoms that he has chosen to wait out.   Pain is  mostly on the left side, but occasionally on the right.  Describes the pain sharp, stabbing.  Associated with wheezing.  No exacerbating factors.  No associated cough.  Lasts 30-60 sec and then self resolved.  Somewhat pleuritic in nature.      Review of Systems   Constitutional: Negative for fever, chills and activity change.   HENT: Negative for postnasal drip, rhinorrhea, sinus pressure and congestion.    Respiratory: Positive for wheezing and dyspnea on extertion. Negative for cough, hemoptysis and shortness of breath.    Cardiovascular: Negative for chest pain and leg swelling.   Genitourinary: Negative for difficulty urinating.   Endocrine: Negative for cold intolerance and heat intolerance.    Musculoskeletal: Negative for joint swelling and myalgias.   Gastrointestinal: Negative for nausea, vomiting and abdominal pain.   Neurological: Positive for headaches.   Hematological: Negative for adenopathy. No excessive bruising.   Psychiatric/Behavioral: Negative for confusion and sleep disturbance.         Social History   Substance Use Topics    Smoking status: Passive Smoke Exposure - Never Smoker    Smokeless tobacco: Former User    Alcohol use No       Review of patient's allergies indicates:  No Known Allergies  Past Medical History:   Diagnosis Date    Anxiety     Atelectasis     Spontaneous pneumothorax 10/2012     Past Surgical History:   Procedure Laterality Date    BLEBECTOMY  Dec 2012    for recurrent spontaneous L pneumothorax    CHEST TUBE INSERTION       Current Outpatient Prescriptions on File Prior to Visit   Medication Sig    albuterol 90 mcg/actuation inhaler Inhale 1 puff into the lungs every 12 (twelve) hours. And as needed for symptoms of wheezing and sob    esomeprazole (NEXIUM) 40 MG capsule Take 1 capsule (40 mg total) by mouth 2 (two) times daily before meals.     No current facility-administered medications on file prior to visit.        Objective:      Physical Exam    Constitutional: He is oriented to person, place, and time. He appears well-developed. No distress.   Very thin   HENT:   Head: Normocephalic.   Neck: Normal range of motion.   Cardiovascular: Normal rate, regular rhythm and normal heart sounds.    No murmur heard.  Pulmonary/Chest: Hyperinflation, effort normal and breath sounds normal. He has no wheezes. He has no rhonchi. He has no rales. He exhibits no tenderness.   Abdominal: Soft. Bowel sounds are normal. He exhibits no distension. There is no tenderness.   Musculoskeletal: Normal range of motion. He exhibits no edema.   Tall, very thin.  Long arm span.  Significant kyphosis.   Neurological: He is alert and oriented to person, place, and time.   Skin: Skin is warm and dry. He is not diaphoretic. No cyanosis. Nails show no clubbing.   Psychiatric: He has a normal mood and affect.   Vitals reviewed.    Personal Diagnostic Review  PFTs: 7/25/17: no obstruction.  Evidence of hyperinflation.  Normal DLCO        Assessment:     Orders Placed This Encounter   Procedures    Sedimentation rate, manual     Standing Status:   Future     Standing Expiration Date:   11/4/2018    C-reactive protein     Standing Status:   Future     Standing Expiration Date:   11/4/2018    DENISE     Standing Status:   Future     Standing Expiration Date:   11/4/2018     1. Pleurisy    2. Mild persistent asthma without complication        Plan:       · Possible asthma/inflammatory process contributing to his pleurisy, but I remain unconvinced.  Will attempt empiric treatment and monitor symptoms  · starting dulera 2 puffs every 12 hours as maintenance therapy.  Patient instructed to gargle after each use.  · The patient was given a spacer.    · Used a demonstration inhaler to review technique x2 and patient was encouraged to ask questions.  · Patient instructed to continue using the medication until our next follow up visit.  · Continue albuterol as needed if he is short of breath during  the day  · Mr. Vaughan has myriad complaints for someone of his age.  Queried about depression which he denies. Additionally, I am concerned that his constellation of symptoms could potentially be more involved.   · Sending ESR, CRP, DENISE.    · Some physical characteristics that could be consistent with Marfan's syndrome and with history of recurrent spontaneous pneumothorax I believe it warrants an evaluation by rheumatology.  Aortic root was normal on recent ECHO.

## 2017-09-06 ENCOUNTER — TELEPHONE (OUTPATIENT)
Dept: ENDOSCOPY | Facility: HOSPITAL | Age: 22
End: 2017-09-06

## 2017-09-08 ENCOUNTER — TELEPHONE (OUTPATIENT)
Dept: INTERNAL MEDICINE | Facility: CLINIC | Age: 22
End: 2017-09-08

## 2017-09-08 DIAGNOSIS — K21.9 GASTROESOPHAGEAL REFLUX DISEASE WITHOUT ESOPHAGITIS: ICD-10-CM

## 2017-09-08 RX ORDER — ESOMEPRAZOLE MAGNESIUM 40 MG/1
40 CAPSULE, DELAYED RELEASE ORAL
Qty: 180 CAPSULE | Refills: 0 | Status: SHIPPED | OUTPATIENT
Start: 2017-09-08 | End: 2018-03-13 | Stop reason: SDUPTHER

## 2017-09-10 ENCOUNTER — TELEPHONE (OUTPATIENT)
Dept: GASTROENTEROLOGY | Facility: CLINIC | Age: 22
End: 2017-09-10

## 2017-09-11 DIAGNOSIS — J43.9 LUNG DISEASE, BULLOUS: Primary | ICD-10-CM

## 2017-09-11 NOTE — TELEPHONE ENCOUNTER
Attempted to contact patient without success.    Left message for patient to return call to the clinic.

## 2017-09-12 ENCOUNTER — TELEPHONE (OUTPATIENT)
Dept: GASTROENTEROLOGY | Facility: CLINIC | Age: 22
End: 2017-09-12

## 2017-09-12 NOTE — TELEPHONE ENCOUNTER
Spoke with patient. Pt states that chest pain and heartburn has not improved since he started the Nexium. Pt has follow-up in the clinic already scheduled.

## 2017-09-13 ENCOUNTER — OFFICE VISIT (OUTPATIENT)
Dept: INTERNAL MEDICINE | Facility: CLINIC | Age: 22
End: 2017-09-13
Payer: COMMERCIAL

## 2017-09-13 ENCOUNTER — LAB VISIT (OUTPATIENT)
Dept: LAB | Facility: HOSPITAL | Age: 22
End: 2017-09-13
Attending: INTERNAL MEDICINE
Payer: COMMERCIAL

## 2017-09-13 VITALS
WEIGHT: 137.13 LBS | BODY MASS INDEX: 18.17 KG/M2 | SYSTOLIC BLOOD PRESSURE: 132 MMHG | DIASTOLIC BLOOD PRESSURE: 82 MMHG | HEART RATE: 84 BPM | HEIGHT: 73 IN

## 2017-09-13 DIAGNOSIS — F41.9 ANXIETY: ICD-10-CM

## 2017-09-13 DIAGNOSIS — M25.50 ARTHRALGIA, UNSPECIFIED JOINT: ICD-10-CM

## 2017-09-13 DIAGNOSIS — R05.9 COUGH: ICD-10-CM

## 2017-09-13 DIAGNOSIS — J43.9 LUNG DISEASE, BULLOUS: Primary | ICD-10-CM

## 2017-09-13 LAB
CRP SERPL-MCNC: 0.2 MG/L
ERYTHROCYTE [SEDIMENTATION RATE] IN BLOOD BY WESTERGREN METHOD: 2 MM/HR

## 2017-09-13 PROCEDURE — 99214 OFFICE O/P EST MOD 30 MIN: CPT | Mod: S$GLB,,, | Performed by: INTERNAL MEDICINE

## 2017-09-13 PROCEDURE — 99999 PR PBB SHADOW E&M-EST. PATIENT-LVL III: CPT | Mod: PBBFAC,,, | Performed by: INTERNAL MEDICINE

## 2017-09-13 PROCEDURE — 85651 RBC SED RATE NONAUTOMATED: CPT

## 2017-09-13 PROCEDURE — 3008F BODY MASS INDEX DOCD: CPT | Mod: S$GLB,,, | Performed by: INTERNAL MEDICINE

## 2017-09-13 PROCEDURE — 86140 C-REACTIVE PROTEIN: CPT

## 2017-09-13 PROCEDURE — 86038 ANTINUCLEAR ANTIBODIES: CPT

## 2017-09-13 PROCEDURE — 36415 COLL VENOUS BLD VENIPUNCTURE: CPT

## 2017-09-15 LAB — ANA SER QL IF: NORMAL

## 2017-09-15 NOTE — PROGRESS NOTES
"Subjective:       Patient ID: Darci Vaughan Jr. is a 21 y.o. male.    Chief Complaint: Follow-up   this is a 21-year-old who presents today for follow-up he has been following with pulmonary has since last visit history of previous labs and having trouble with wheezing and cough and shortness of breath at times.  He was started on dulera for his symptoms.  He has also seen cardiology for further evaluation possibility of Marfan's but his echo and tests were normal she is continuing to undergo further evaluation with his pulmonologist.  He gets occasional musculoskeletal pain joint pain no swelling or discomfort currently but has had intermittent issues ankles and chest wall when he moves his upper body.  He has occasional chest discomfort and shortness of breath although his wheezing does seem improved with the use of his Dulera.  He gets anxious and has been undergoing therapy with a psychologist who also recommended psychiatry which he plans to do.    HPI  Review of Systems   Constitutional: Positive for fatigue.   Respiratory:        Wheezing at times  Discomfort    Musculoskeletal:        Occasional join pain ankles recently   Upper arms at times no swelling    Neurological: Negative for headaches.   Psychiatric/Behavioral:        Anxiety appetite loss        Objective:     Blood pressure 132/82, pulse 84, height 6' 1" (1.854 m), weight 62.2 kg (137 lb 2 oz).    Physical Exam   Constitutional: No distress.   Thin tall    HENT:   Head: Normocephalic.   Mouth/Throat: Oropharynx is clear and moist.   Eyes: No scleral icterus.   Neck: Neck supple.   Cardiovascular: Normal rate, regular rhythm and normal heart sounds.  Exam reveals no gallop and no friction rub.    No murmur heard.  Pulmonary/Chest: Effort normal and breath sounds normal. No respiratory distress.   Improved    Abdominal: Soft. Bowel sounds are normal. He exhibits no mass. There is no tenderness.   Musculoskeletal: He exhibits no edema. "   Neurological: He is alert.   Skin: No erythema.   Psychiatric:   Anxious at times    Vitals reviewed.      Assessment:       1. Lung disease, bullous    2. Anxiety    3. Arthralgia, unspecified joint    4. Cough        Plan:       Darci was seen today for follow-up.    Diagnoses and all orders for this visit:    Lung disease, bullous  Cough he is now using Dulera some improvement    Anxiety  Patient is following with psychology for counseling and was encouraged to continue to do so he will also follow-up with psychology dietary for treatment options as planned he will call to arrange for a number provided and he will discuss with his psychologist as well  -     Ambulatory consult to Psychiatry    Arthralgia, unspecified joint  Intermittent proceed with  -     DENISE; Future  -     Sedimentation rate, manual; Future  -     C-reactive protein; Future    He has seen gi for egd and had hatal hernia/gastritis he is taking ppi and will follow up with gi as recommended  He is no longer taking anti inflammatory medication currently

## 2017-09-26 ENCOUNTER — OFFICE VISIT (OUTPATIENT)
Dept: GASTROENTEROLOGY | Facility: CLINIC | Age: 22
End: 2017-09-26
Payer: COMMERCIAL

## 2017-09-26 VITALS
SYSTOLIC BLOOD PRESSURE: 133 MMHG | HEIGHT: 74 IN | WEIGHT: 138.88 LBS | DIASTOLIC BLOOD PRESSURE: 90 MMHG | HEART RATE: 84 BPM | BODY MASS INDEX: 17.82 KG/M2

## 2017-09-26 DIAGNOSIS — R13.19 ESOPHAGEAL DYSPHAGIA: ICD-10-CM

## 2017-09-26 DIAGNOSIS — R10.11 RUQ ABDOMINAL PAIN: ICD-10-CM

## 2017-09-26 DIAGNOSIS — K59.04 CHRONIC IDIOPATHIC CONSTIPATION: Primary | ICD-10-CM

## 2017-09-26 DIAGNOSIS — R07.89 ATYPICAL CHEST PAIN: ICD-10-CM

## 2017-09-26 DIAGNOSIS — K21.9 GASTROESOPHAGEAL REFLUX DISEASE, ESOPHAGITIS PRESENCE NOT SPECIFIED: ICD-10-CM

## 2017-09-26 PROCEDURE — 3008F BODY MASS INDEX DOCD: CPT | Mod: S$GLB,,, | Performed by: PHYSICIAN ASSISTANT

## 2017-09-26 PROCEDURE — 99999 PR PBB SHADOW E&M-EST. PATIENT-LVL III: CPT | Mod: PBBFAC,,, | Performed by: PHYSICIAN ASSISTANT

## 2017-09-26 PROCEDURE — 99214 OFFICE O/P EST MOD 30 MIN: CPT | Mod: S$GLB,,, | Performed by: PHYSICIAN ASSISTANT

## 2017-09-26 NOTE — PATIENT INSTRUCTIONS
Start taking miralax 17g, mix in 10 oz of water, take around 6pm. It akes about 12 hours to work.     Call me if this does not work    Start taking a probiotic daily- try culturelle

## 2017-09-26 NOTE — PROGRESS NOTES
[facility] Consultation Note    Reason for Consult:  The primary encounter diagnosis was Chronic idiopathic constipation. Diagnoses of Atypical chest pain, Esophageal dysphagia, RUQ abdominal pain, and Gastroesophageal reflux disease, esophagitis presence not specified were also pertinent to this visit.    PCP:   Anita Rodrigues       Referring MD:  No referring provider defined for this encounter.    HPI:  This is a 21 y.o. male here to follow up on his weight loss and GERD  GERD x few yrs  8/30/17 EGD- 1cm hiatal hernia, esophagus Bx normal, patchy chronic gastritis on Bx, H. pylori neg  8/2017 ABD US - no gallstones    He has been taking nexium 40mg daily for 6 week  In general feels about 50% better  Some improvement in dypshagia, but still occurring severall times a week.   5-10 episodes of chest pain a week after meals  Abdominal pain has somewhat improved, but still occuring including RUQ pain  His weight has been stable for about 2 months  + Poor appetite x few months   + early satiety    BM once or twice a week  Having LUQ and RUQ pain, sharp, 8/10, Last minutes, occurring once or twice a week  Worsened with meals    No relief with zantac and tums  No nausea or vomiting  No black stools or rectal bleeding    He admits that his anxiety and depression is not under control.   His lifestyle is also sedentary    ROS:  Constitutional: No fevers, chills, weight loss  ENT: No allergies  CV: No chest pain  Pulm: No cough, No shortness of breath  Ophtho: No vision changes  GI: see HPI  Derm: No rash  Heme: No lymphadenopathy, No bruising  MSK: No arthritis  : No dysuria, No hematuria  Endo: No hot or cold intolerance  Neuro: No syncope, No seizure  Psych: + anxiety, + depression    Medical History:  has a past medical history of Anxiety; Atelectasis; Chronic constipation; GERD (gastroesophageal reflux disease); and Spontaneous pneumothorax (10/2012).    Surgical History:  has a past  "surgical history that includes Chest tube insertion; BLEBECTOMY (Dec 2012); and Upper gastrointestinal endoscopy.    Family History: family history includes Allergies in his sister; Asthma in his father and mother; Cancer in his maternal grandmother; Emphysema in his maternal grandfather; Migraines in his father and sister; Seizures in his sister..     Social History:  reports that he is a non-smoker but has been exposed to tobacco smoke. He has quit using smokeless tobacco. He reports that he does not drink alcohol or use drugs.    Review of patient's allergies indicates:  No Known Allergies    Current Outpatient Prescriptions on File Prior to Visit   Medication Sig Dispense Refill    albuterol 90 mcg/actuation inhaler Inhale 1 puff into the lungs every 12 (twelve) hours. And as needed for symptoms of wheezing and sob 1 Inhaler 5    esomeprazole (NEXIUM) 40 MG capsule Take 1 capsule (40 mg total) by mouth 2 (two) times daily before meals. 180 capsule 0    mometasone-formoterol (DULERA) 100-5 mcg/actuation HFAA Inhale 2 puffs into the lungs every 12 (twelve) hours. Controller 1 Inhaler 3     No current facility-administered medications on file prior to visit.          Objective Findings:    Vital Signs:  BP (!) 133/90   Pulse 84   Ht 6' 2" (1.88 m)   Wt 63 kg (138 lb 14.2 oz)   BMI 17.83 kg/m²   Body mass index is 17.83 kg/m².    Physical Exam:  General Appearance: Well appearing in no acute distress  Head:   Normocephalic, without obvious abnormality  Eyes:    No scleral icterus  ENT: Neck supple, Lips, mucosa, and tongue normal  Lungs: CTA bilaterally in anterior and posterior fields, no wheezes, no crackles.  Heart:  Regular rate and rhythm, S1, S2 normal, no murmurs heard  Abdomen: Soft, epigastric tenderness, non distended with positive bowel sounds in all four quadrants.   Extremities: no edema  Skin: No rash  Neurologic: AAO x 3      Labs:  Lab Results   Component Value Date    WBC 5.24 07/13/2017    " HGB 15.5 2017    HCT 47.2 2017     2017    CHOL 183 2017    TRIG 41 2017    HDL 41 2017    ALT 15 2017    AST 15 2017     2017    K 3.8 2017     2017    CREATININE 1.1 2017    BUN 16 2017    CO2 27 2017    TSH 2.069 2017    INR 1.0 2014     Lab Results   Component Value Date    LIPASE 40 2017       Imagin2017 ABD US - no gallstones    Endoscopy:    17 EGD- 1cm hiatal hernia, esophagus Bx normal, patchy chronic gastritis on Bx, H. pylori neg    Assessment:  1. Chronic idiopathic constipation    2. Atypical chest pain    3. Esophageal dysphagia    4. RUQ abdominal pain    5. Gastroesophageal reflux disease, esophagitis presence not specified       22yo male with long Hx of GERD, hx of 10lb weight loss no stable, atypical chest pain, dysphagia, and epi pain after meals. His small hiatal hernia may be contributing. 50% improvement on Nexium 40mg    He also has chronic constipation which may be contributing to his poor appetite    Recommendations:  1. HIDA scan to rule out biliary dyskinesia    2. Schedule esophageal manometry to rule out esophageal dysmotility    3. Continue Nexium 40mg    4. Advised f/u with psych and increase exercise.     5. Start miralax 17g nightly    6. Start a probiotic    Return in about 3 months (around 2017). with MD      Order summary:  Orders Placed This Encounter    NM Hepatobiliary (HIDA) W Pharm and Ejection Fraction    Case request GI: MANOMETRY-ESOPHAGEAL         Thank you so much for allowing me to participate in the care of Darci Shah PA-C

## 2017-09-27 PROBLEM — R13.19 ESOPHAGEAL DYSPHAGIA: Status: ACTIVE | Noted: 2017-09-27

## 2017-09-27 PROBLEM — K59.04 CHRONIC IDIOPATHIC CONSTIPATION: Status: ACTIVE | Noted: 2017-09-27

## 2017-09-27 PROBLEM — K21.9 GASTROESOPHAGEAL REFLUX DISEASE: Status: ACTIVE | Noted: 2017-09-27

## 2017-10-10 ENCOUNTER — HOSPITAL ENCOUNTER (OUTPATIENT)
Dept: RADIOLOGY | Facility: HOSPITAL | Age: 22
Discharge: HOME | End: 2017-10-10
Attending: PHYSICIAN ASSISTANT
Payer: COMMERCIAL

## 2017-10-10 DIAGNOSIS — R10.11 RUQ ABDOMINAL PAIN: ICD-10-CM

## 2017-10-10 PROCEDURE — 78227 HEPATOBIL SYST IMAGE W/DRUG: CPT | Mod: TC

## 2017-10-10 PROCEDURE — 78227 HEPATOBIL SYST IMAGE W/DRUG: CPT | Mod: 26,,, | Performed by: RADIOLOGY

## 2017-10-10 PROCEDURE — B4155 EF INCOMPLETE/MODULAR: HCPCS

## 2017-10-12 ENCOUNTER — OFFICE VISIT (OUTPATIENT)
Dept: RHEUMATOLOGY | Facility: CLINIC | Age: 22
End: 2017-10-12
Payer: COMMERCIAL

## 2017-10-12 VITALS
WEIGHT: 136.56 LBS | DIASTOLIC BLOOD PRESSURE: 79 MMHG | SYSTOLIC BLOOD PRESSURE: 117 MMHG | HEART RATE: 89 BPM | HEIGHT: 74 IN | BODY MASS INDEX: 17.52 KG/M2

## 2017-10-12 DIAGNOSIS — R07.89 CHEST WALL PAIN: Primary | ICD-10-CM

## 2017-10-12 DIAGNOSIS — Z87.09 HX OF PNEUMOTHORAX: ICD-10-CM

## 2017-10-12 PROCEDURE — 99244 OFF/OP CNSLTJ NEW/EST MOD 40: CPT | Mod: S$GLB,,, | Performed by: INTERNAL MEDICINE

## 2017-10-12 PROCEDURE — 99999 PR PBB SHADOW E&M-EST. PATIENT-LVL III: CPT | Mod: PBBFAC,,, | Performed by: INTERNAL MEDICINE

## 2017-10-12 ASSESSMENT — ROUTINE ASSESSMENT OF PATIENT INDEX DATA (RAPID3)
PATIENT GLOBAL ASSESSMENT SCORE: 5
AM STIFFNESS SCORE: 1, YES
PAIN SCORE: 5.6
FATIGUE SCORE: 7
TOTAL RAPID3 SCORE: 6.86
MDHAQ FUNCTION SCORE: 3
PSYCHOLOGICAL DISTRESS SCORE: 3.3

## 2017-10-12 NOTE — LETTER
October 17, 2017      Yolis Mejia MD  [address]  [place] [facility] [address]           [facility]  [address]  [facility] [address]  Phone: [phone]  Fax: [phone]          Patient: Darci Vaughan Jr.   MR Number: [id number]   YOB: 1995   Date of Visit: 10/12/2017       Dear Dr. Yolis Mejia:    Thank you for referring Darci Vaughan to me for evaluation. Attached you will find relevant portions of my assessment and plan of care.    If you have questions, please do not hesitate to call me. I look forward to following Darci Vaughan along with you.    Sincerely,    Sylvester Mckenzie MD    Enclosure  CC:  No Recipients    If you would like to receive this communication electronically, please contact [email or web address] or [phone] to request more information on [employer] Link access.    For providers and/or their staff who would like to refer a patient to [facility], please contact us through our one-stop-shop provider referral line, [facility], at [phone].    If you feel you have received this communication in error or would no longer like to receive these types of communications, please e-mail [email or web address]

## 2017-10-14 ENCOUNTER — PATIENT MESSAGE (OUTPATIENT)
Dept: GASTROENTEROLOGY | Facility: CLINIC | Age: 22
End: 2017-10-14

## 2017-10-14 DIAGNOSIS — K21.9 DUODENOGASTRIC BILE REFLUX: Primary | ICD-10-CM

## 2017-10-14 RX ORDER — SUCRALFATE 1 G/1
1 TABLET ORAL 2 TIMES DAILY
Qty: 60 TABLET | Refills: 1 | Status: SHIPPED | OUTPATIENT
Start: 2017-10-14 | End: 2019-06-28

## 2017-10-17 NOTE — PROGRESS NOTES
History of present illness: 21-year-old gentleman is accompanied by his parents.  He has a history of depressive disorder.  He comes to see me because of anterior chest pain.  This began several months ago.  He noticed it after lifting.  The pain is worse when he is lying down.  It lasts 1 or 2 month minutes.  It occurs several times daily.  He also had 2 episodes of spontaneous pneumothorax.  Because of his body habitus, he was referred to rule out Marfan's syndrome.    His father side of the family is tall.  He has no rashes.  He has a history of migraine.  He has had no visual difficulty.  He has had normal eye exams in the past.  He has occasional dysphagia.  He is being evaluated for abdominal pain.  He has occasional paresthesias in his hands.  His grandmother has some type of arthritis.    Systems review:  Gen.: Weight has decreased 10 pounds  Respiratory: No chronic cough or sputum production.  Occasional shortness of breath.    Physical examination:  ENT: He has no conjunctival injection.  Throat exam reveals normal palate.  Chest: Clear to auscultation.  No chest wall tenderness.  Cardiac: No murmurs, gallops, rubs  Abdomen: No organomegaly or masses.  No tenderness to palpation  Extremities: He has long tapering fingers.  He has able to wrap his fingers around his wrist.  His thumb does not protrude from his closed fist.  Musculoskeletal: He has no evidence of joint hyperextensibility.  His arm length is equal to his body length.    Assessment:  1.  He does not have physical criteria for Marfan syndrome.  2.  He probably has chest wall pain    Discussion: No further workup is needed from a rheumatologic standpoint.

## 2017-10-20 ENCOUNTER — TELEPHONE (OUTPATIENT)
Dept: ENDOSCOPY | Facility: HOSPITAL | Age: 22
End: 2017-10-20

## 2017-11-13 ENCOUNTER — OFFICE VISIT (OUTPATIENT)
Dept: INTERNAL MEDICINE | Facility: CLINIC | Age: 22
End: 2017-11-13
Payer: COMMERCIAL

## 2017-11-13 VITALS
WEIGHT: 136 LBS | BODY MASS INDEX: 17.45 KG/M2 | DIASTOLIC BLOOD PRESSURE: 82 MMHG | HEART RATE: 72 BPM | SYSTOLIC BLOOD PRESSURE: 114 MMHG | HEIGHT: 74 IN

## 2017-11-13 DIAGNOSIS — K21.9 GASTROESOPHAGEAL REFLUX DISEASE, ESOPHAGITIS PRESENCE NOT SPECIFIED: Primary | ICD-10-CM

## 2017-11-13 DIAGNOSIS — J43.9 LUNG DISEASE, BULLOUS: ICD-10-CM

## 2017-11-13 DIAGNOSIS — F41.9 ANXIETY: ICD-10-CM

## 2017-11-13 PROCEDURE — 99999 PR PBB SHADOW E&M-EST. PATIENT-LVL III: CPT | Mod: PBBFAC,,, | Performed by: INTERNAL MEDICINE

## 2017-11-13 PROCEDURE — 99214 OFFICE O/P EST MOD 30 MIN: CPT | Mod: S$GLB,,, | Performed by: INTERNAL MEDICINE

## 2017-11-13 NOTE — PROGRESS NOTES
"Subjective:       Patient ID: Darci Vaughan Jr. is a 21 y.o. male.    Chief Complaint: Follow-up  21 year old presents for follow up. Pt reports has been a bit better . He denies sob or wheezing. He is using dulera which has helped.  He has followed with gi and taking nexium recently started on sucralfate. He had hiatal hernia and reflux noted on recent imaging. He also when to see rheumatology to evalute  For ctd /marfans which they felt he had neither. He still is not eating very regularly. He eats 1-2 meals daily. His mother brings him today and reports he lives with his grandmother currently.  He has some anxiety helped counseling helped but did not schedule follow up. He plans to do so.     HPI  Review of Systems   HENT:        Occasinal nasal bleeding    Respiratory: Negative for shortness of breath and wheezing.    Gastrointestinal:        Reflux following gi taking nexium  New medication has not started yet    Psychiatric/Behavioral:        Some anxiety   Did not follow up with psyhiatry plans to to so        Objective:     Blood pressure 114/82, pulse 72, height 6' 2" (1.88 m), weight 61.7 kg (136 lb 0.4 oz).    Physical Exam   Constitutional: No distress.   Thin    HENT:   Head: Normocephalic.   Mouth/Throat: Oropharynx is clear and moist.   No bleeding mild congestion    Eyes: No scleral icterus.   Neck: Neck supple.   Cardiovascular: Normal rate, regular rhythm and normal heart sounds.  Exam reveals no gallop and no friction rub.    No murmur heard.  Pulmonary/Chest: Effort normal and breath sounds normal. No respiratory distress.   Abdominal: Soft. Bowel sounds are normal. He exhibits no mass. There is no tenderness.   Musculoskeletal: He exhibits no edema.   Neurological: He is alert.   Skin: No erythema.   Vitals reviewed.      Assessment:       1. Gastroesophageal reflux disease, esophagitis presence not specified    2. Lung disease, bullous    3. Anxiety        Plan:       Darci was seen today " for follow-up.    Diagnoses and all orders for this visit:    Gastroesophageal reflux disease, esophagitis presence not specified  He is following with gi hiatal hernia and bilary dudoenogastric reflux  He plans to start additional medication and additional gi testing     Lung disease, bullous  He remains on dulera with improvement following with pulmonary   Hx prior pneumothorax asymptomatic currently     Anxiety  Follow up with psychology/psychiatry encouraged    Nasal bleeding discussed moistuize with vaseline  Discussed ent if persists or concern     Follow up 4 months

## 2017-11-28 ENCOUNTER — OFFICE VISIT (OUTPATIENT)
Dept: PULMONOLOGY | Facility: CLINIC | Age: 22
End: 2017-11-28
Payer: COMMERCIAL

## 2017-11-28 VITALS
WEIGHT: 143.94 LBS | SYSTOLIC BLOOD PRESSURE: 118 MMHG | HEART RATE: 119 BPM | HEIGHT: 74 IN | OXYGEN SATURATION: 99 % | DIASTOLIC BLOOD PRESSURE: 70 MMHG | BODY MASS INDEX: 18.47 KG/M2

## 2017-11-28 DIAGNOSIS — J45.909 ASTHMA, UNSPECIFIED ASTHMA SEVERITY, UNSPECIFIED WHETHER COMPLICATED, UNSPECIFIED WHETHER PERSISTENT: Primary | ICD-10-CM

## 2017-11-28 PROCEDURE — 99213 OFFICE O/P EST LOW 20 MIN: CPT | Mod: S$GLB,,, | Performed by: INTERNAL MEDICINE

## 2017-11-28 PROCEDURE — 99999 PR PBB SHADOW E&M-EST. PATIENT-LVL III: CPT | Mod: PBBFAC,,, | Performed by: INTERNAL MEDICINE

## 2017-11-28 RX ORDER — ALBUTEROL SULFATE 90 UG/1
1 AEROSOL, METERED RESPIRATORY (INHALATION) EVERY 12 HOURS
Qty: 1 INHALER | Refills: 5 | Status: SHIPPED | OUTPATIENT
Start: 2017-11-28 | End: 2019-06-28

## 2017-11-28 NOTE — PROGRESS NOTES
Subjective:       Patient ID: Darci Vaughan Jr. is a 21 y.o. male.    Chief Complaint: No chief complaint on file.    HPI:   Darci Vaughan Jr. is a 21 y.o. male who is followed for history of recurrent pneumothorax and concern for possible asthma.      At last visit, the patient was started on dulera.  He felt that he was back to normal while taking the dulera twice a day. Chest pain resolved while on the inhaler.  He stopped taking it for the last week or so and reports difficulty breathing (shortness of breath with exertion) and his pleuritic chest pain has returned.  He admits non-compliance lately but cannot state exactly what the issue is.  He does not wish to switch to a once daily inhaler.  No ED or Urgent care visits since last visit.   Additionally, he reports difficulty with anxiety.  He has been referred to psychiatry but hasn't called for an appointment.        Patient History:    CT chest 8/2/17: Relatively unremarkable.  Evidence of a left apical bullectomy.  No cystic lung disease.  No interstitial fibrosis.  No mediastinal or pneumoperitoneum.  As per radiology read no evidence of total pleurodesis.  Additionally, the patient was started on albuterol twice a day to decrease hyperinflation.  He took the albuterol a few times, but complained of chest pain with inhalation and discontinued it.  He has stopped working all together because he has pain in his chest when lifting anything 'heavy'.  Reports pleuritic chest pain with deep inspiration.  Now reporting SMITH when walking up an incline.  Denies associated wheezing.  He has quit playing basketball entirely.  He denies depression, but his affect is subdued.      Initial History:   In 2012, the patient was taking a bath when he noted a sharp pain on the left side of his chest that radiated upwards towards the shoulder.  He suddenly became short of breath.  He then was taken to the ED at [facility].  Imaging showed you had a pneumothorax.  He received a  small bore chest tube was place and he was admitted to the hospital.  His lung re-expanded, the tube was removed and he was discharged from the hospital.     Approximately 1 month later, he was at school- studying, not in any sports like activity- and similar symptoms recurred.  He was again taken to the ED, found to have a left sided pnx and chest tube was placed and he underwent VATS apical resection, blebectomy, and pleurodesis.      He has had no pneumothorax since that time, but reports that he has had similar symptoms that he has chosen to wait out.   Pain is mostly on the left side, but occasionally on the right.  Describes the pain sharp, stabbing.  Associated with wheezing.  No exacerbating factors.  No associated cough.  Lasts 30-60 sec and then self resolved.  Somewhat pleuritic in nature.      Review of Systems   Constitutional: Negative for fever, chills and activity change.   HENT: Negative for postnasal drip, rhinorrhea, sinus pressure and congestion.    Respiratory: Positive for chest tightness, wheezing and dyspnea on extertion. Negative for cough, hemoptysis and shortness of breath.    Cardiovascular: Negative for chest pain and leg swelling.   Genitourinary: Negative for difficulty urinating.   Endocrine: Negative for cold intolerance and heat intolerance.    Musculoskeletal: Negative for joint swelling and myalgias.   Gastrointestinal: Negative for nausea, vomiting and abdominal pain.   Neurological: Negative for headaches.   Hematological: Negative for adenopathy. No excessive bruising.   Psychiatric/Behavioral: Negative for confusion and sleep disturbance.         Social History   Substance Use Topics    Smoking status: Passive Smoke Exposure - Never Smoker    Smokeless tobacco: Former User    Alcohol use No       Review of patient's allergies indicates:  No Known Allergies  Past Medical History:   Diagnosis Date    Anxiety     Atelectasis     Chronic constipation     GERD  (gastroesophageal reflux disease)     Spontaneous pneumothorax 10/2012     Past Surgical History:   Procedure Laterality Date    BLEBECTOMY  Dec 2012    for recurrent spontaneous L pneumothorax    CHEST TUBE INSERTION      UPPER GASTROINTESTINAL ENDOSCOPY       Current Outpatient Prescriptions on File Prior to Visit   Medication Sig    albuterol 90 mcg/actuation inhaler Inhale 1 puff into the lungs every 12 (twelve) hours. And as needed for symptoms of wheezing and sob    esomeprazole (NEXIUM) 40 MG capsule Take 1 capsule (40 mg total) by mouth 2 (two) times daily before meals.    mometasone-formoterol (DULERA) 100-5 mcg/actuation HFAA Inhale 2 puffs into the lungs every 12 (twelve) hours. Controller    sucralfate (CARAFATE) 1 gram tablet Take 1 tablet (1 g total) by mouth 2 (two) times daily. Separate from other medicines by 1 hr.     No current facility-administered medications on file prior to visit.        Objective:      Physical Exam   Constitutional: He is oriented to person, place, and time. He appears well-developed. No distress.   Very thin   HENT:   Head: Normocephalic.   Neck: Normal range of motion.   Cardiovascular: Normal rate, regular rhythm and normal heart sounds.    No murmur heard.  Pulmonary/Chest: Hyperinflation, effort normal and breath sounds normal. He has no wheezes. He has no rhonchi. He has no rales. He exhibits no tenderness.   Abdominal: Soft. Bowel sounds are normal. He exhibits no distension. There is no tenderness.   Musculoskeletal: Normal range of motion. He exhibits no edema.   Neurological: He is alert and oriented to person, place, and time.   Skin: Skin is warm and dry. He is not diaphoretic. No cyanosis. Nails show no clubbing.   Psychiatric: He has a normal mood and affect.   Vitals reviewed.    Personal Diagnostic Review  PFTs: 7/25/17: no obstruction.  Evidence of hyperinflation.  Normal DLCO        Assessment:       1. Asthma, unspecified asthma severity,  unspecified whether complicated, unspecified whether persistent        Plan:       Recommend continued treatment with dulera q12h.   Offered to switch to once daily breo- but patient declined on two occasions.   Reminded him to gargle after taking the medication.   Continue albuterol prn.    Encouraged patient to be compliant with medication and to attempt to play basketball and other physical  activities he used to enjoy but were previously limited by respiratory symptoms.  Encouraged patient to seek  with psychiatry as he is finding his anxiety debilitating.    Follow up in 3 months or sooner if needed.

## 2017-12-01 ENCOUNTER — TELEPHONE (OUTPATIENT)
Dept: ENDOSCOPY | Facility: HOSPITAL | Age: 22
End: 2017-12-01

## 2017-12-01 ENCOUNTER — OFFICE VISIT (OUTPATIENT)
Dept: GASTROENTEROLOGY | Facility: CLINIC | Age: 22
End: 2017-12-01
Payer: COMMERCIAL

## 2017-12-01 VITALS
SYSTOLIC BLOOD PRESSURE: 126 MMHG | BODY MASS INDEX: 17.86 KG/M2 | HEART RATE: 115 BPM | DIASTOLIC BLOOD PRESSURE: 81 MMHG | WEIGHT: 139.13 LBS | HEIGHT: 74 IN

## 2017-12-01 DIAGNOSIS — R07.89 ATYPICAL CHEST PAIN: Primary | ICD-10-CM

## 2017-12-01 DIAGNOSIS — R10.13 EPIGASTRIC PAIN: ICD-10-CM

## 2017-12-01 DIAGNOSIS — R13.19 ESOPHAGEAL DYSPHAGIA: ICD-10-CM

## 2017-12-01 PROCEDURE — 99213 OFFICE O/P EST LOW 20 MIN: CPT | Mod: S$GLB,,, | Performed by: INTERNAL MEDICINE

## 2017-12-01 PROCEDURE — 99999 PR PBB SHADOW E&M-EST. PATIENT-LVL III: CPT | Mod: PBBFAC,,, | Performed by: INTERNAL MEDICINE

## 2017-12-01 RX ORDER — AMITRIPTYLINE HYDROCHLORIDE 10 MG/1
10 TABLET, FILM COATED ORAL NIGHTLY
Qty: 30 TABLET | Refills: 3 | Status: SHIPPED | OUTPATIENT
Start: 2017-12-01 | End: 2017-12-11 | Stop reason: SDUPTHER

## 2017-12-01 NOTE — PROGRESS NOTES
[facility] Consultation Note    Reason for Consult:  The primary encounter diagnosis was Atypical chest pain. Diagnoses of Esophageal dysphagia and Epigastric pain were also pertinent to this visit.    PCP:   Anita Rodrigues       Referring MD:  No referring provider defined for this encounter.    HPI:  This is a 21 y.o. male referred by Dr. Darlin Fabian for evaluation of weight loss    Has lost 10lbs in 2 months  Taking nexium 3 weeks, helped at first but  No longer  GERD x few yrs  Chest pain with meals  Frequent dysphagia of foods and pills  Poor appetite x few months    BM once or twice a week  Having LUQ and RUQ pain, sharp, 8/10, Last minutes, occurring once or twice a week  Worsened with meals    No relief with zantac and tums  No nausea or vomiting  No black stools or rectal bleeding    Interval history:  Epigastric pain 3x a week, sometimes after eating, sometimes not related to food.   He states he did not  carafate prescription, does not like taking those kinds of pills    ROS:  Constitutional: No fevers, chills, + weight loss  ENT: No allergies  CV: No chest pain  Pulm: No cough, No shortness of breath  Ophtho: No vision changes  GI: see HPI  Derm: No rash  Heme: No lymphadenopathy, No bruising  MSK: No arthritis  : No dysuria, No hematuria  Endo: No hot or cold intolerance  Neuro: No syncope, No seizure  Psych: No anxiety, No depression    Medical History:  has a past medical history of Anxiety; Atelectasis; Chronic constipation; GERD (gastroesophageal reflux disease); and Spontaneous pneumothorax (10/2012).    Surgical History:  has a past surgical history that includes Chest tube insertion; BLEBECTOMY (Dec 2012); and Upper gastrointestinal endoscopy.    Family History: family history includes Allergies in his sister; Asthma in his father and mother; Cancer in his maternal grandmother; Emphysema in his maternal grandfather; Migraines in his father and sister;  "Seizures in his sister..     Social History:  reports that he is a non-smoker but has been exposed to tobacco smoke. He has quit using smokeless tobacco. He reports that he does not drink alcohol or use drugs.    Review of patient's allergies indicates:  No Known Allergies    Current Outpatient Prescriptions on File Prior to Visit   Medication Sig Dispense Refill    albuterol 90 mcg/actuation inhaler Inhale 1 puff into the lungs every 12 (twelve) hours. And as needed for symptoms of wheezing and sob 1 Inhaler 5    esomeprazole (NEXIUM) 40 MG capsule Take 1 capsule (40 mg total) by mouth 2 (two) times daily before meals. 180 capsule 0    mometasone-formoterol (DULERA) 100-5 mcg/actuation HFAA Inhale 2 puffs into the lungs every 12 (twelve) hours. Controller 1 Inhaler 3    sucralfate (CARAFATE) 1 gram tablet Take 1 tablet (1 g total) by mouth 2 (two) times daily. Separate from other medicines by 1 hr. 60 tablet 1     No current facility-administered medications on file prior to visit.          Objective Findings:    Vital Signs:  /81   Pulse (!) 115   Ht 6' 2" (1.88 m)   Wt 63.1 kg (139 lb 1.8 oz)   BMI 17.86 kg/m²   Body mass index is 17.86 kg/m².    Physical Exam:  General Appearance: Well appearing in no acute distress  Head:   Normocephalic, without obvious abnormality  Eyes:    No scleral icterus  ENT: Neck supple, Lips, mucosa, and tongue normal  Lungs: CTA bilaterally in anterior and posterior fields, no wheezes, no crackles.  Heart:  Regular rate and rhythm, S1, S2 normal, no murmurs heard  Abdomen: Soft, epigastric tenderness, non distended with positive bowel sounds in all four quadrants.   Extremities: no edema  Skin: No rash  Neurologic: AAO x 3      Labs:  Lab Results   Component Value Date    WBC 5.24 07/13/2017    HGB 15.5 07/13/2017    HCT 47.2 07/13/2017     07/13/2017    CHOL 183 07/08/2017    TRIG 41 07/08/2017    HDL 41 07/08/2017    ALT 15 07/17/2017    AST 15 07/17/2017    NA " 139 07/17/2017    K 3.8 07/17/2017     07/17/2017    CREATININE 1.1 07/17/2017    BUN 16 07/17/2017    CO2 27 07/17/2017    TSH 2.069 07/08/2017    INR 1.0 01/05/2014     Lab Results   Component Value Date    LIPASE 40 07/17/2017     H pylori - negative iGG  Celiac - negative  EKG Qtc - 423    Imaging:  HIDA - wnl  Ultrasound - wnl    Endoscopy:    EGD 2017 - wnl, 1cm hiatal hernia    Assessment:  1. Atypical chest pain    2. Esophageal dysphagia    3. Epigastric pain       22yo male with long Hx of GERD, abdominal pain. His workup thus far has been negative, he still has a manometry test pending. Suspicious for functional abdominal pain.    Recommendations:  1. Labs to rule out h. Pylori and celiac - negative    2. abd us to rule out gallstones - normal    3. Increase nexium to 40mg daily    4. Schedule EGD  - unremarkable    5. Still needs to schedule motility - will do today    6. Trial of elavil for functional abdominal pain - explained to him the rationale for trying this. Would stay away from remeron given his age and risk for worsening depression.    Return in about 3 months (around 3/1/2018).      Order summary:  Orders Placed This Encounter    amitriptyline (ELAVIL) 10 MG tablet         Thank you so much for allowing me to participate in the care of Darci Woodward MD

## 2017-12-11 RX ORDER — AMITRIPTYLINE HYDROCHLORIDE 10 MG/1
10 TABLET, FILM COATED ORAL NIGHTLY
Qty: 30 TABLET | Refills: 3 | Status: SHIPPED | OUTPATIENT
Start: 2017-12-11 | End: 2018-03-07 | Stop reason: SDUPTHER

## 2017-12-27 ENCOUNTER — HOSPITAL ENCOUNTER (OUTPATIENT)
Facility: HOSPITAL | Age: 22
Discharge: HOME | End: 2017-12-27
Attending: INTERNAL MEDICINE | Admitting: INTERNAL MEDICINE
Payer: COMMERCIAL

## 2017-12-27 ENCOUNTER — SURGERY (OUTPATIENT)
Age: 22
End: 2017-12-27

## 2017-12-27 VITALS
HEART RATE: 86 BPM | RESPIRATION RATE: 16 BRPM | BODY MASS INDEX: 17.23 KG/M2 | DIASTOLIC BLOOD PRESSURE: 86 MMHG | WEIGHT: 130 LBS | OXYGEN SATURATION: 98 % | HEIGHT: 73 IN | SYSTOLIC BLOOD PRESSURE: 128 MMHG | TEMPERATURE: 99 F

## 2017-12-27 DIAGNOSIS — K21.9 GERD (GASTROESOPHAGEAL REFLUX DISEASE): ICD-10-CM

## 2017-12-27 PROCEDURE — 25000003 PHARM REV CODE 250: Performed by: INTERNAL MEDICINE

## 2017-12-27 PROCEDURE — 91037 ESOPH IMPED FUNCTION TEST: CPT | Performed by: INTERNAL MEDICINE

## 2017-12-27 PROCEDURE — 91037 ESOPH IMPED FUNCTION TEST: CPT | Mod: 26,,, | Performed by: INTERNAL MEDICINE

## 2017-12-27 PROCEDURE — 91010 ESOPHAGUS MOTILITY STUDY: CPT | Performed by: INTERNAL MEDICINE

## 2017-12-27 PROCEDURE — 91010 ESOPHAGUS MOTILITY STUDY: CPT | Mod: 26,,, | Performed by: INTERNAL MEDICINE

## 2017-12-27 RX ORDER — LIDOCAINE HYDROCHLORIDE 20 MG/ML
JELLY TOPICAL ONCE
Status: COMPLETED | OUTPATIENT
Start: 2017-12-27 | End: 2017-12-27

## 2017-12-27 RX ADMIN — LIDOCAINE HYDROCHLORIDE 10 ML: 20 JELLY TOPICAL at 10:12

## 2017-12-27 NOTE — DISCHARGE INSTRUCTIONS
Esophageal Manometry     A catheter measures pressure along the esophagus.     Esophageal manometry is a test to measure the strength and function of the esophagus (the food pipe). Results can help identify causes of heartburn, swallowing problems, or chest pain. The test can also help plan surgery and determine the success of previous surgery.  Preparing for the test  Be sure to talk to your healthcare provider about any medicines you take. Some medicines can affect the test results. Also ask any questions you have about the risks of the test. These include irritation to the nose and throat. Be sure not to smoke, eat, or drink for up to 12 hours before the test.  During the test  Manometry takes about an hour. Usually you lie down during the test. Your nose and throat are numbed. Then a soft, thin tube is placed through the nose and down the esophagus. At first you may notice a gagging feeling. You will be asked to swallow several times. Holes along the tube measure the pressure while you swallow. Measurements are printed out as tracings, much like a heart test tracing. After the test, another catheter may be left in the esophagus for up to 24 hours to measure acid (pH) levels.  After esophageal manometry  Youll probably discuss the results of the test with your healthcare provider at another appointment. This is because time is needed to review the tracings. You may have a mild sore throat for a short time. As soon as the numbness in your throat is gone, you can return to eating and your normal activities.  Date Last Reviewed: 6/1/2016  © 1464-9847 [employer]. [address] [place] [address], [place] [address]. All rights reserved. This information is not intended as a substitute for professional medical care. Always follow your healthcare professional's instructions.

## 2017-12-28 ENCOUNTER — PATIENT MESSAGE (OUTPATIENT)
Dept: GASTROENTEROLOGY | Facility: CLINIC | Age: 22
End: 2017-12-28

## 2017-12-28 ENCOUNTER — TELEPHONE (OUTPATIENT)
Dept: GASTROENTEROLOGY | Facility: CLINIC | Age: 22
End: 2017-12-28

## 2018-01-03 ENCOUNTER — TELEPHONE (OUTPATIENT)
Dept: ENDOSCOPY | Facility: HOSPITAL | Age: 23
End: 2018-01-03

## 2018-01-05 ENCOUNTER — TELEPHONE (OUTPATIENT)
Dept: GASTROENTEROLOGY | Facility: CLINIC | Age: 23
End: 2018-01-05

## 2018-01-05 NOTE — TELEPHONE ENCOUNTER
----- Message from Robel Valentine MD sent at 1/2/2018 11:05 AM CST -----  Darci your High resolution esophageal manometry red by Dr. Woodward and ordered by Esteban Shah was read as follows and recommend you schedule GI clinic apt with Dr. Woodward to discuss and to further evaluated.    Impression:   - Manometry indicating ineffective esophageal motility.

## 2018-02-09 ENCOUNTER — OFFICE VISIT (OUTPATIENT)
Dept: INTERNAL MEDICINE | Facility: CLINIC | Age: 23
End: 2018-02-09
Payer: COMMERCIAL

## 2018-02-09 VITALS
BODY MASS INDEX: 17.2 KG/M2 | WEIGHT: 134 LBS | DIASTOLIC BLOOD PRESSURE: 72 MMHG | HEART RATE: 123 BPM | SYSTOLIC BLOOD PRESSURE: 98 MMHG | OXYGEN SATURATION: 96 % | HEIGHT: 74 IN

## 2018-02-09 DIAGNOSIS — K21.9 GASTROESOPHAGEAL REFLUX DISEASE, ESOPHAGITIS PRESENCE NOT SPECIFIED: Primary | ICD-10-CM

## 2018-02-09 DIAGNOSIS — R10.13 EPIGASTRIC ABDOMINAL PAIN: ICD-10-CM

## 2018-02-09 PROCEDURE — 99999 PR PBB SHADOW E&M-EST. PATIENT-LVL III: CPT | Mod: PBBFAC,,, | Performed by: NURSE PRACTITIONER

## 2018-02-09 PROCEDURE — 99213 OFFICE O/P EST LOW 20 MIN: CPT | Mod: S$GLB,,, | Performed by: NURSE PRACTITIONER

## 2018-02-09 PROCEDURE — 3008F BODY MASS INDEX DOCD: CPT | Mod: S$GLB,,, | Performed by: NURSE PRACTITIONER

## 2018-02-09 NOTE — PROGRESS NOTES
"Subjective:       Patient ID: Darci Vaughan Jr. is a 22 y.o. male.    Chief Complaint: Abdominal Pain    HPI:  21 yo male that presents to clinic today for GERD and abdominal pain x 2 days.    21 yo old with long history of GERD and abdominal pain.    A 1 cm hiatal hernia was present on EGD in 2017    High resolution esphogeal manometry December 2017  Showed ineffective esophageal motility.    Patient states that pain is located in his upper abdomen and states that he had one episode of vomiting yesterday.  Pain location is consistent with previous episodes of abdominal pain per patient.  States that he is still taking elavil and Carafate but is no longer taking Nexium anymore "because it doesn't work."  States that the stomach pain usually comes on after eating a meal.  Reports that he does eat large sized meals.      Denies any fever, chest pain or SOB.  Denies any black stools.    Review of Systems   Constitutional: Negative for activity change, appetite change and fatigue.   Respiratory: Negative for apnea, cough, shortness of breath and wheezing.    Cardiovascular: Negative for chest pain, palpitations and leg swelling.   Gastrointestinal: Positive for abdominal pain, nausea and vomiting. Negative for abdominal distention, constipation and diarrhea.   Musculoskeletal: Negative for arthralgias, back pain, gait problem, neck pain and neck stiffness.   Neurological: Negative for dizziness, light-headedness, numbness and headaches.   Psychiatric/Behavioral: Negative for behavioral problems.       Objective:      Physical Exam   Constitutional: He is oriented to person, place, and time. He appears well-developed and well-nourished. No distress.   Cardiovascular: Normal rate, regular rhythm, normal heart sounds and intact distal pulses.    No murmur heard.  Pulmonary/Chest: Effort normal and breath sounds normal. No respiratory distress. He has no wheezes. He has no rales.   Abdominal: Soft. Bowel sounds are " normal. He exhibits no distension and no mass. There is tenderness (with palpation to epigastric area.  No rebound.). There is no rebound and no guarding. No hernia.   Neurological: He is alert and oriented to person, place, and time. No sensory deficit.   Psychiatric: His behavior is normal.       Assessment:       1. Gastroesophageal reflux disease, esophagitis presence not specified    2. Epigastric abdominal pain        Plan:             1. Gastroesophageal reflux disease, esophagitis presence not specified   -Continue to take Carafate as prescribed.  -Follow up with gastroenterology as directed.    2. Epigastric abdominal pain   -Restart nexium 40mg po daily.  -Continue to take daily elavil.  -Decrease meal portion size and avoid eating large portioned meals as this may be contributing to stomach pain.

## 2018-02-09 NOTE — PATIENT INSTRUCTIONS
Resume nexium 40mg once a day.    Decrease meal portion size.  Avoid large portions and eating late at night.

## 2018-03-07 RX ORDER — AMITRIPTYLINE HYDROCHLORIDE 10 MG/1
10 TABLET, FILM COATED ORAL NIGHTLY
Qty: 30 TABLET | Refills: 3 | Status: SHIPPED | OUTPATIENT
Start: 2018-03-07 | End: 2018-04-02 | Stop reason: SDUPTHER

## 2018-03-13 ENCOUNTER — OFFICE VISIT (OUTPATIENT)
Dept: INTERNAL MEDICINE | Facility: CLINIC | Age: 23
End: 2018-03-13
Payer: COMMERCIAL

## 2018-03-13 ENCOUNTER — LAB VISIT (OUTPATIENT)
Dept: LAB | Facility: HOSPITAL | Age: 23
End: 2018-03-13
Attending: INTERNAL MEDICINE
Payer: COMMERCIAL

## 2018-03-13 DIAGNOSIS — F41.9 ANXIETY: ICD-10-CM

## 2018-03-13 DIAGNOSIS — K21.9 GASTROESOPHAGEAL REFLUX DISEASE WITHOUT ESOPHAGITIS: ICD-10-CM

## 2018-03-13 DIAGNOSIS — J43.9 LUNG DISEASE, BULLOUS: ICD-10-CM

## 2018-03-13 DIAGNOSIS — R13.19 ESOPHAGEAL DYSPHAGIA: Primary | ICD-10-CM

## 2018-03-13 DIAGNOSIS — J45.20 MILD INTERMITTENT ASTHMA WITHOUT COMPLICATION: ICD-10-CM

## 2018-03-13 LAB
ANION GAP SERPL CALC-SCNC: 6 MMOL/L
BASOPHILS # BLD AUTO: 0.06 K/UL
BASOPHILS NFR BLD: 0.9 %
BUN SERPL-MCNC: 5 MG/DL
CALCIUM SERPL-MCNC: 9.9 MG/DL
CHLORIDE SERPL-SCNC: 103 MMOL/L
CO2 SERPL-SCNC: 31 MMOL/L
CREAT SERPL-MCNC: 0.9 MG/DL
DIFFERENTIAL METHOD: NORMAL
EOSINOPHIL # BLD AUTO: 0.1 K/UL
EOSINOPHIL NFR BLD: 2 %
ERYTHROCYTE [DISTWIDTH] IN BLOOD BY AUTOMATED COUNT: 14 %
EST. GFR  (AFRICAN AMERICAN): >60 ML/MIN/1.73 M^2
EST. GFR  (NON AFRICAN AMERICAN): >60 ML/MIN/1.73 M^2
GLUCOSE SERPL-MCNC: 83 MG/DL
HCT VFR BLD AUTO: 44.6 %
HGB BLD-MCNC: 14.7 G/DL
LYMPHOCYTES # BLD AUTO: 2.2 K/UL
LYMPHOCYTES NFR BLD: 32.4 %
MCH RBC QN AUTO: 28.1 PG
MCHC RBC AUTO-ENTMCNC: 33 G/DL
MCV RBC AUTO: 85 FL
MONOCYTES # BLD AUTO: 0.8 K/UL
MONOCYTES NFR BLD: 11.4 %
NEUTROPHILS # BLD AUTO: 3.7 K/UL
NEUTROPHILS NFR BLD: 53 %
NRBC BLD-RTO: 0 /100 WBC
PLATELET # BLD AUTO: 261 K/UL
PMV BLD AUTO: 9.4 FL
POTASSIUM SERPL-SCNC: 4.1 MMOL/L
RBC # BLD AUTO: 5.24 M/UL
SODIUM SERPL-SCNC: 140 MMOL/L
TSH SERPL DL<=0.005 MIU/L-ACNC: 1.72 UIU/ML
WBC # BLD AUTO: 6.91 K/UL

## 2018-03-13 PROCEDURE — 84443 ASSAY THYROID STIM HORMONE: CPT

## 2018-03-13 PROCEDURE — 80048 BASIC METABOLIC PNL TOTAL CA: CPT

## 2018-03-13 PROCEDURE — 80076 HEPATIC FUNCTION PANEL: CPT

## 2018-03-13 PROCEDURE — 85025 COMPLETE CBC W/AUTO DIFF WBC: CPT

## 2018-03-13 PROCEDURE — 99999 PR PBB SHADOW E&M-EST. PATIENT-LVL IV: CPT | Mod: PBBFAC,,, | Performed by: INTERNAL MEDICINE

## 2018-03-13 PROCEDURE — 36415 COLL VENOUS BLD VENIPUNCTURE: CPT

## 2018-03-13 PROCEDURE — 99214 OFFICE O/P EST MOD 30 MIN: CPT | Mod: S$GLB,,, | Performed by: INTERNAL MEDICINE

## 2018-03-13 RX ORDER — ESOMEPRAZOLE MAGNESIUM 40 MG/1
40 CAPSULE, DELAYED RELEASE ORAL
Qty: 180 CAPSULE | Refills: 0
Start: 2018-03-13 | End: 2019-06-28

## 2018-03-14 VITALS
WEIGHT: 140.19 LBS | HEART RATE: 88 BPM | HEIGHT: 74 IN | SYSTOLIC BLOOD PRESSURE: 122 MMHG | BODY MASS INDEX: 17.99 KG/M2 | DIASTOLIC BLOOD PRESSURE: 78 MMHG

## 2018-03-14 PROBLEM — J45.20 MILD INTERMITTENT ASTHMA WITHOUT COMPLICATION: Status: ACTIVE | Noted: 2018-03-14

## 2018-03-14 LAB
ALBUMIN SERPL BCP-MCNC: 4.3 G/DL
ALP SERPL-CCNC: 73 U/L
ALT SERPL W/O P-5'-P-CCNC: 18 U/L
AST SERPL-CCNC: 20 U/L
BILIRUB DIRECT SERPL-MCNC: 0.2 MG/DL
BILIRUB SERPL-MCNC: 0.5 MG/DL
PROT SERPL-MCNC: 7.5 G/DL

## 2018-03-14 NOTE — PROGRESS NOTES
"Subjective:       Patient ID: Darci Vaughan Jr. is a 22 y.o. male.    Chief Complaint: Follow-up   this is a 22-year-old who presents today for follow-up.  He continues to have ongoing GI issues and following with gastroenterology he has not been taking his Nexium regularly but has been avoiding anti-inflammatory medications.  Patient has been recently started on Elavil for his symptoms and had some dysmotility noted on his manometry as well.  He has follow-up with GI later this week.  He has been uses his inhalers and that seems to be doing well for him he denies current shortness of breath or cough.  He continues to have difficulty with anxiety did not follow-up with psychology or psychiatry as discussed but will consider scheduling mother will encourage him to do so as well.     HPI  Review of Systems   Constitutional: Negative for chills, fever and unexpected weight change.   Respiratory: Negative for chest tightness and shortness of breath.    Cardiovascular: Negative for chest pain and palpitations.   Gastrointestinal: Negative for abdominal pain, nausea and vomiting.        Difficulty reflux at time  Burning and stomach pain intermittant   No nausea or vomiting   Neurological: Negative for weakness.   Psychiatric/Behavioral:        Anxious at times has not followed up with his  Psychology or psychiatry as recommended        Objective:     Blood pressure 124/82, pulse 88, height 6' 2" (1.88 m), weight 63.6 kg (140 lb 3.4 oz).    Physical Exam   Constitutional: No distress.   Thin    HENT:   Head: Normocephalic.   Mouth/Throat: Oropharynx is clear and moist.   Eyes: No scleral icterus.   Neck: Neck supple.   Cardiovascular: Normal rate, regular rhythm and normal heart sounds.  Exam reveals no gallop and no friction rub.    No murmur heard.  Pulmonary/Chest: Effort normal and breath sounds normal. No respiratory distress.   Abdominal: Soft. Bowel sounds are normal. He exhibits no mass. There is no tenderness. "   Musculoskeletal: He exhibits no edema.   Neurological: He is alert.   Skin: No erythema.   Vitals reviewed.      Assessment:       1. Esophageal dysphagia    2. Gastroesophageal reflux disease without esophagitis    3. Lung disease, bullous    4. Anxiety    5. Mild intermittent asthma without complication        Plan:       Darci was seen today for follow-up.    Diagnoses and all orders for this visit:    Esophageal dysphagia  Gastroesophageal reflux disease without esophagitis   discussed resuming  He was started on ellavil   -     esomeprazole (NEXIUM) 40 MG capsule; Take 1 capsule (40 mg total) by mouth before breakfast.  -     Basic metabolic panel; Future  -     CBC auto differential; Future  -     TSH; Future  -     Hepatic function panel; Future    Lung disease, bullous  Asthma he  Dulera using and albuterol with impromvent   He continues to follow with pulmonary some improvement overall.     Anxiety  Psychiatry/pscyology he was encouraged to schedule a follow up recommended   For counseling and additional evaluation     Update labs and review

## 2018-03-23 ENCOUNTER — OFFICE VISIT (OUTPATIENT)
Dept: GASTROENTEROLOGY | Facility: CLINIC | Age: 23
End: 2018-03-23
Payer: COMMERCIAL

## 2018-03-23 VITALS
BODY MASS INDEX: 17.77 KG/M2 | SYSTOLIC BLOOD PRESSURE: 119 MMHG | WEIGHT: 138.44 LBS | DIASTOLIC BLOOD PRESSURE: 78 MMHG | HEART RATE: 111 BPM | HEIGHT: 74 IN

## 2018-03-23 DIAGNOSIS — R10.84 GENERALIZED ABDOMINAL PAIN: ICD-10-CM

## 2018-03-23 PROCEDURE — 99999 PR PBB SHADOW E&M-EST. PATIENT-LVL III: CPT | Mod: PBBFAC,,, | Performed by: INTERNAL MEDICINE

## 2018-03-23 PROCEDURE — 99213 OFFICE O/P EST LOW 20 MIN: CPT | Mod: S$GLB,,, | Performed by: INTERNAL MEDICINE

## 2018-03-23 NOTE — PROGRESS NOTES
[facility] Consultation Note    Reason for Consult:  The encounter diagnosis was Generalized abdominal pain.    PCP:   Anita Rodrigues       Referring MD:  No referring provider defined for this encounter.    HPI:  This is a 22 y.o. male referred by Dr. Darlin Fabian for evaluation of weight loss    Has lost 10lbs in 2 months  Taking nexium 3 weeks, helped at first but  No longer  GERD x few yrs  Chest pain with meals  Frequent dysphagia of foods and pills  Poor appetite x few months    BM once or twice a week  Having LUQ and RUQ pain, sharp, 8/10, Last minutes, occurring once or twice a week  Worsened with meals    No relief with zantac and tums  No nausea or vomiting  No black stools or rectal bleeding    Interval history:  Feels 50% better on elavil 10 mg  Still feeling tight in abdomen when he eats  Mother worried because he only eats once a day, but states that his anxiety and mood may have something to do with it    ROS:  Constitutional: No fevers, chills, + weight has been stable last few visits  ENT: No allergies  CV: No chest pain  Pulm: No cough, No shortness of breath  Ophtho: No vision changes  GI: see HPI  Derm: No rash  Heme: No lymphadenopathy, No bruising  MSK: No arthritis  : No dysuria, No hematuria  Endo: No hot or cold intolerance  Neuro: No syncope, No seizure  Psych: No anxiety, No depression    Medical History:  has a past medical history of Anxiety; Asthma; Atelectasis; Chronic constipation; GERD (gastroesophageal reflux disease); and Spontaneous pneumothorax (10/2012).    Surgical History:  has a past surgical history that includes Chest tube insertion; BLEBECTOMY (Dec 2012); and Upper gastrointestinal endoscopy.    Family History: family history includes Allergies in his sister; Asthma in his father and mother; Cancer in his maternal grandmother; Emphysema in his maternal grandfather; Migraines in his father and sister; Seizures in his sister..     Social  "History:  reports that he is a non-smoker but has been exposed to tobacco smoke. He has quit using smokeless tobacco. He reports that he does not drink alcohol or use drugs.    Review of patient's allergies indicates:  No Known Allergies    Current Outpatient Prescriptions on File Prior to Visit   Medication Sig Dispense Refill    albuterol 90 mcg/actuation inhaler Inhale 1 puff into the lungs every 12 (twelve) hours. And as needed for symptoms of wheezing and sob 1 Inhaler 5    amitriptyline (ELAVIL) 10 MG tablet Take 1 tablet (10 mg total) by mouth every evening. 30 tablet 3    esomeprazole (NEXIUM) 40 MG capsule Take 1 capsule (40 mg total) by mouth before breakfast. 180 capsule 0    mometasone-formoterol (DULERA) 100-5 mcg/actuation HFAA Inhale 2 puffs into the lungs every 12 (twelve) hours. Controller 3 Inhaler 3    sucralfate (CARAFATE) 1 gram tablet Take 1 tablet (1 g total) by mouth 2 (two) times daily. Separate from other medicines by 1 hr. 60 tablet 1     No current facility-administered medications on file prior to visit.          Objective Findings:    Vital Signs:  /78   Pulse (!) 111   Ht 6' 2" (1.88 m)   Wt 62.8 kg (138 lb 7.2 oz)   BMI 17.78 kg/m²   Body mass index is 17.78 kg/m².    Physical Exam:  General Appearance: Well appearing in no acute distress  Head:   Normocephalic, without obvious abnormality  Eyes:    No scleral icterus  ENT: Neck supple, Lips, mucosa, and tongue normal  Lungs: CTA bilaterally in anterior and posterior fields, no wheezes, no crackles.  Heart:  Regular rate and rhythm, S1, S2 normal, no murmurs heard  Abdomen: Soft, epigastric tenderness, non distended with positive bowel sounds in all four quadrants.   Extremities: no edema  Skin: No rash  Neurologic: AAO x 3      Labs:  Lab Results   Component Value Date    WBC 6.91 03/13/2018    HGB 14.7 03/13/2018    HCT 44.6 03/13/2018     03/13/2018    CHOL 183 07/08/2017    TRIG 41 07/08/2017    HDL 41 " 07/08/2017    ALT 18 03/13/2018    AST 20 03/13/2018     03/13/2018    K 4.1 03/13/2018     03/13/2018    CREATININE 0.9 03/13/2018    BUN 5 (L) 03/13/2018    CO2 31 (H) 03/13/2018    TSH 1.721 03/13/2018    INR 1.0 01/05/2014     Lab Results   Component Value Date    LIPASE 40 07/17/2017     H pylori - negative iGG  Celiac - negative  EKG Qtc - 423    Imaging:  HIDA - wnl  Ultrasound - wnl    Endoscopy:    EGD 2017 - wnl, 1cm hiatal hernia    Assessment:  1. Generalized abdominal pain       22yo male with long Hx of GERD, abdominal pain. His workup thus far has been negative, he still has a manometry test pending. Suspicious for functional abdominal pain.    Recommendations:  1. Will get CT scan to r/o other causes of abdominal pain  2. Increase elavil to 25mg as he has had some improvement at 10mg    Follow-up in about 6 months (around 9/23/2018).      Order summary:  Orders Placed This Encounter    CT Abdomen Pelvis With Contrast         Thank you so much for allowing me to participate in the care of Darci Woodward MD

## 2018-04-03 RX ORDER — AMITRIPTYLINE HYDROCHLORIDE 25 MG/1
25 TABLET, FILM COATED ORAL NIGHTLY
Qty: 30 TABLET | Refills: 6 | Status: SHIPPED | OUTPATIENT
Start: 2018-04-03 | End: 2019-06-28

## 2018-08-20 ENCOUNTER — TELEPHONE (OUTPATIENT)
Dept: PULMONOLOGY | Facility: CLINIC | Age: 23
End: 2018-08-20

## 2018-08-20 NOTE — TELEPHONE ENCOUNTER
I left the pt a voicemail to let him know rx was refilled. However, pt needs f/u for asthma with Dr Mejia, pt to called back to schedule.

## 2018-08-30 RX ORDER — BUDESONIDE AND FORMOTEROL FUMARATE DIHYDRATE 160; 4.5 UG/1; UG/1
2 AEROSOL RESPIRATORY (INHALATION) EVERY 12 HOURS
Qty: 1 INHALER | Refills: 0 | Status: SHIPPED | OUTPATIENT
Start: 2018-08-30 | End: 2019-06-28

## 2018-09-18 ENCOUNTER — OFFICE VISIT (OUTPATIENT)
Dept: GASTROENTEROLOGY | Facility: CLINIC | Age: 23
End: 2018-09-18
Payer: COMMERCIAL

## 2018-09-18 VITALS
DIASTOLIC BLOOD PRESSURE: 78 MMHG | SYSTOLIC BLOOD PRESSURE: 121 MMHG | HEIGHT: 74 IN | HEART RATE: 95 BPM | BODY MASS INDEX: 18.95 KG/M2 | WEIGHT: 147.69 LBS

## 2018-09-18 DIAGNOSIS — R10.84 GENERALIZED ABDOMINAL PAIN: Primary | ICD-10-CM

## 2018-09-18 PROCEDURE — 99999 PR PBB SHADOW E&M-EST. PATIENT-LVL III: CPT | Mod: PBBFAC,,, | Performed by: INTERNAL MEDICINE

## 2018-09-18 PROCEDURE — 99213 OFFICE O/P EST LOW 20 MIN: CPT | Mod: S$GLB,,, | Performed by: INTERNAL MEDICINE

## 2018-09-18 PROCEDURE — 3008F BODY MASS INDEX DOCD: CPT | Mod: CPTII,S$GLB,, | Performed by: INTERNAL MEDICINE

## 2018-09-18 NOTE — PROGRESS NOTES
[facility] Consultation Note    Reason for Consult:  The encounter diagnosis was Generalized abdominal pain.    PCP:   Anita Rodrigues       Referring MD:  No referring provider defined for this encounter.    HPI:  This is a 22 y.o. male referred by Dr. Darlin Fabian for evaluation of weight loss    Has lost 10lbs in 2 months  Taking nexium 3 weeks, helped at first but  No longer  GERD x few yrs  Chest pain with meals  Frequent dysphagia of foods and pills  Poor appetite x few months    BM once or twice a week  Having LUQ and RUQ pain, sharp, 8/10, Last minutes, occurring once or twice a week  Worsened with meals    No relief with zantac and tums  No nausea or vomiting  No black stools or rectal bleeding    Interval history:  Still having some abdominal pain, worse at work  Starts after eating  Stopped elavil after a month    ROS:  Constitutional: No fevers, chills, + weight gain of 10 lbs  ENT: No allergies  CV: No chest pain  Pulm: No cough, No shortness of breath  Ophtho: No vision changes  GI: see HPI  Derm: No rash  Heme: No lymphadenopathy, No bruising  MSK: No arthritis  : No dysuria, No hematuria  Endo: No hot or cold intolerance  Neuro: No syncope, No seizure  Psych: No anxiety, No depression    Medical History:  has a past medical history of Anxiety, Asthma, Atelectasis, Chronic constipation, GERD (gastroesophageal reflux disease), and Spontaneous pneumothorax (10/2012).    Surgical History:  has a past surgical history that includes Chest tube insertion; BLEBECTOMY (Dec 2012); Upper gastrointestinal endoscopy; MANOMETRY-ESOPHAGEAL-WITH IMPEDANCE (N/A, 12/27/2017); ESOPHAGOGASTRODUODENOSCOPY (EGD) (N/A, 8/30/2017); and VIDEO-ASSISTED THORACOSCOPIC SURGERY (VATS) (Left, 12/17/2012).    Review of patient's allergies indicates:  No Known Allergies    Current Outpatient Medications on File Prior to Visit   Medication Sig Dispense Refill    albuterol 90 mcg/actuation inhaler  "Inhale 1 puff into the lungs every 12 (twelve) hours. And as needed for symptoms of wheezing and sob 1 Inhaler 5    budesonide-formoterol 160-4.5 mcg (SYMBICORT) 160-4.5 mcg/actuation HFAA Inhale 2 puffs into the lungs every 12 (twelve) hours. Controller 1 Inhaler 0    amitriptyline (ELAVIL) 25 MG tablet Take 1 tablet (25 mg total) by mouth every evening. 30 tablet 6    esomeprazole (NEXIUM) 40 MG capsule Take 1 capsule (40 mg total) by mouth before breakfast. 180 capsule 0    sucralfate (CARAFATE) 1 gram tablet Take 1 tablet (1 g total) by mouth 2 (two) times daily. Separate from other medicines by 1 hr. 60 tablet 1     No current facility-administered medications on file prior to visit.          Objective Findings:    Vital Signs:  /78   Pulse 95   Ht 6' 2" (1.88 m)   Wt 67 kg (147 lb 11.3 oz)   BMI 18.96 kg/m²   Body mass index is 18.96 kg/m².    Physical Exam:  General Appearance: Well appearing in no acute distress  Head:   Normocephalic, without obvious abnormality  Eyes:    No scleral icterus  ENT: Neck supple, Lips, mucosa, and tongue normal  Lungs: CTA bilaterally in anterior and posterior fields, no wheezes, no crackles.  Heart:  Regular rate and rhythm, S1, S2 normal, no murmurs heard  Abdomen: Soft, epigastric tenderness, non distended with positive bowel sounds in all four quadrants.   Extremities: no edema  Skin: No rash  Neurologic: AAO x 3      Labs:  Lab Results   Component Value Date    WBC 6.91 03/13/2018    HGB 14.7 03/13/2018    HCT 44.6 03/13/2018     03/13/2018    CHOL 183 07/08/2017    TRIG 41 07/08/2017    HDL 41 07/08/2017    ALT 18 03/13/2018    AST 20 03/13/2018     03/13/2018    K 4.1 03/13/2018     03/13/2018    CREATININE 0.9 03/13/2018    BUN 5 (L) 03/13/2018    CO2 31 (H) 03/13/2018    TSH 1.721 03/13/2018    INR 1.0 01/05/2014     Lab Results   Component Value Date    LIPASE 40 07/17/2017     H pylori - negative iGG  Celiac - negative  EKG Qtc - " 423    Imaging:  HIDA - wnl  Ultrasound - wnl    Endoscopy:    EGD 2017 - wnl, 1cm hiatal hernia    Manometry - ineffective motility    Assessment:  1. Generalized abdominal pain          22yo male with long Hx of GERD, abdominal pain. His workup thus far has been negative, he still has a manometry test pending. Suspicious for functional abdominal pain.    Recommendations:  1. Will reschedule CT scan to r/o other causes of abdominal pain.  2. MIralax for constipation  3. Not really compliant with meds so will stop any new prescriptions at this time    No Follow-up on file.      Order summary:         Thank you so much for allowing me to participate in the care of Darci Woodward MD

## 2018-09-22 ENCOUNTER — HOSPITAL ENCOUNTER (OUTPATIENT)
Dept: RADIOLOGY | Facility: HOSPITAL | Age: 23
Discharge: HOME | End: 2018-09-22
Attending: INTERNAL MEDICINE
Payer: COMMERCIAL

## 2018-09-22 DIAGNOSIS — R10.84 GENERALIZED ABDOMINAL PAIN: ICD-10-CM

## 2018-09-22 PROCEDURE — 74177 CT ABD & PELVIS W/CONTRAST: CPT | Mod: 26,,, | Performed by: RADIOLOGY

## 2018-09-22 PROCEDURE — 74177 CT ABD & PELVIS W/CONTRAST: CPT | Mod: TC

## 2018-09-22 PROCEDURE — 25500020 PHARM REV CODE 255: Performed by: INTERNAL MEDICINE

## 2018-09-22 RX ADMIN — IOHEXOL 75 ML: 350 INJECTION, SOLUTION INTRAVENOUS at 04:09

## 2018-09-22 RX ADMIN — IOHEXOL 15 ML: 350 INJECTION, SOLUTION INTRAVENOUS at 03:09

## 2018-09-24 ENCOUNTER — TELEPHONE (OUTPATIENT)
Dept: GASTROENTEROLOGY | Facility: CLINIC | Age: 23
End: 2018-09-24

## 2018-09-24 NOTE — TELEPHONE ENCOUNTER
Ma contact pt regarding ct scan. No answer left voicemail with contact info where can be reach [phone].    ----- Message from Gómez Woodward MD sent at 9/23/2018  9:17 PM CDT -----  Please let him know his ct scan came back and everything looks normal

## 2018-12-14 ENCOUNTER — TELEPHONE (OUTPATIENT)
Dept: PULMONOLOGY | Facility: CLINIC | Age: 23
End: 2018-12-14

## 2018-12-14 NOTE — TELEPHONE ENCOUNTER
Left patient a voicemail at 4:08p that currently Dr. Mejia does not have any available appointments at this moment and that I will place him on the wait list in case Dr. Mejia have any cancellations.

## 2019-06-27 ENCOUNTER — NURSE TRIAGE (OUTPATIENT)
Dept: ADMINISTRATIVE | Facility: CLINIC | Age: 24
End: 2019-06-27

## 2019-06-28 ENCOUNTER — OFFICE VISIT (OUTPATIENT)
Dept: INTERNAL MEDICINE | Facility: CLINIC | Age: 24
End: 2019-06-28
Payer: COMMERCIAL

## 2019-06-28 VITALS
OXYGEN SATURATION: 97 % | HEIGHT: 74 IN | WEIGHT: 134.25 LBS | BODY MASS INDEX: 17.23 KG/M2 | HEART RATE: 88 BPM | SYSTOLIC BLOOD PRESSURE: 110 MMHG | DIASTOLIC BLOOD PRESSURE: 80 MMHG

## 2019-06-28 DIAGNOSIS — R53.83 FATIGUE, UNSPECIFIED TYPE: Primary | ICD-10-CM

## 2019-06-28 PROBLEM — R13.19 ESOPHAGEAL DYSPHAGIA: Status: RESOLVED | Noted: 2017-09-27 | Resolved: 2019-06-28

## 2019-06-28 PROBLEM — R07.2 PRECORDIAL PAIN: Status: RESOLVED | Noted: 2017-07-20 | Resolved: 2019-06-28

## 2019-06-28 PROCEDURE — 99999 PR PBB SHADOW E&M-EST. PATIENT-LVL III: CPT | Mod: PBBFAC,,, | Performed by: INTERNAL MEDICINE

## 2019-06-28 PROCEDURE — 99213 PR OFFICE/OUTPT VISIT, EST, LEVL III, 20-29 MIN: ICD-10-PCS | Mod: S$GLB,,, | Performed by: INTERNAL MEDICINE

## 2019-06-28 PROCEDURE — 99999 PR PBB SHADOW E&M-EST. PATIENT-LVL III: ICD-10-PCS | Mod: PBBFAC,,, | Performed by: INTERNAL MEDICINE

## 2019-06-28 PROCEDURE — 3008F PR BODY MASS INDEX (BMI) DOCUMENTED: ICD-10-PCS | Mod: CPTII,S$GLB,, | Performed by: INTERNAL MEDICINE

## 2019-06-28 PROCEDURE — 99213 OFFICE O/P EST LOW 20 MIN: CPT | Mod: S$GLB,,, | Performed by: INTERNAL MEDICINE

## 2019-06-28 PROCEDURE — 3008F BODY MASS INDEX DOCD: CPT | Mod: CPTII,S$GLB,, | Performed by: INTERNAL MEDICINE

## 2019-06-28 RX ORDER — HYDROXYZINE HYDROCHLORIDE 50 MG/1
TABLET, FILM COATED ORAL
COMMUNITY
End: 2019-06-28

## 2019-06-28 NOTE — PROGRESS NOTES
Subjective:       Patient ID: Darci Vaughan Jr. is a 23 y.o. male.    Chief Complaint: Fatigue    HPI - Woke up yesterday feeling generally weak.  Arms and legs were weak.  Trouble getting out of bed.  However, he went to work.  Feels better today.  Some nausea.  No vomiting, diarrhea, fever, chills.  No other symptoms.  Notes that he drinks upwards of a gallon of water every day.    Pmh/meds:  Reviewed and reconciled in EPIC with patient during visit today.    Review of Systems   Constitutional: Negative for fever.   HENT: Negative for congestion.    Respiratory: Negative for cough.    Cardiovascular: Negative for chest pain.   Gastrointestinal: Positive for nausea. Negative for abdominal pain.   Genitourinary: Negative for difficulty urinating.   Musculoskeletal: Negative for arthralgias.   Skin: Negative for rash.   Neurological: Positive for weakness.   Psychiatric/Behavioral: Negative for sleep disturbance.       Objective:      Physical Exam   Constitutional: He is oriented to person, place, and time. He appears well-developed and well-nourished. No distress.   Tall underweight young man in no distress   HENT:   Head: Normocephalic and atraumatic.   Cardiovascular: Normal rate, regular rhythm and normal heart sounds. Exam reveals no gallop and no friction rub.   No murmur heard.  Pulmonary/Chest: No respiratory distress. He has no wheezes. He has no rales. He exhibits no tenderness.   Musculoskeletal:   Strength 4-5/5, equal bilaterally all groups upper and lower extremities   Neurological: He is alert and oriented to person, place, and time.   Skin: Skin is warm. He is not diaphoretic. No erythema.   Psychiatric: He has a normal mood and affect. Thought content normal.   Nursing note and vitals reviewed.      Assessment:       1. Fatigue, unspecified type        Plan:       Darci was seen today for fatigue.    Diagnoses and all orders for this visit:    Fatigue, unspecified type - unclear etiology.   Provided reassurance.  Suggested less water intake; only drink when thirsty  -     Comprehensive metabolic panel; Future  -     TSH; Future  -     CBC auto differential; Future    Will call with lab results.  rtc kathia Ngo MD MPH  Staff Internist

## 2019-07-05 ENCOUNTER — TELEPHONE (OUTPATIENT)
Dept: INTERNAL MEDICINE | Facility: CLINIC | Age: 24
End: 2019-07-05

## 2019-07-05 DIAGNOSIS — Z00.00 ANNUAL PHYSICAL EXAM: Primary | ICD-10-CM

## 2019-07-05 NOTE — TELEPHONE ENCOUNTER
----- Message from Katy Lanier sent at 7/5/2019  3:47 PM CDT -----  Contact: self/[phone]  What orders is pt asking for?: annual lab order    Is there a future appointment with the provider?: yes    When?: 07/15/19    Comments?:

## 2019-07-15 ENCOUNTER — OFFICE VISIT (OUTPATIENT)
Dept: INTERNAL MEDICINE | Facility: CLINIC | Age: 24
End: 2019-07-15
Payer: COMMERCIAL

## 2019-07-15 ENCOUNTER — HOSPITAL ENCOUNTER (OUTPATIENT)
Dept: RADIOLOGY | Facility: HOSPITAL | Age: 24
Discharge: HOME | End: 2019-07-15
Attending: INTERNAL MEDICINE
Payer: COMMERCIAL

## 2019-07-15 VITALS
BODY MASS INDEX: 17.28 KG/M2 | DIASTOLIC BLOOD PRESSURE: 62 MMHG | TEMPERATURE: 98 F | HEART RATE: 83 BPM | SYSTOLIC BLOOD PRESSURE: 118 MMHG | OXYGEN SATURATION: 99 % | WEIGHT: 134.69 LBS | HEIGHT: 74 IN

## 2019-07-15 DIAGNOSIS — J45.20 MILD INTERMITTENT ASTHMA WITHOUT COMPLICATION: ICD-10-CM

## 2019-07-15 DIAGNOSIS — J43.9 LUNG DISEASE, BULLOUS: ICD-10-CM

## 2019-07-15 DIAGNOSIS — Z00.00 ANNUAL PHYSICAL EXAM: ICD-10-CM

## 2019-07-15 DIAGNOSIS — K21.9 GASTROESOPHAGEAL REFLUX DISEASE, ESOPHAGITIS PRESENCE NOT SPECIFIED: ICD-10-CM

## 2019-07-15 DIAGNOSIS — Z87.09 HX OF PNEUMOTHORAX: ICD-10-CM

## 2019-07-15 DIAGNOSIS — Z00.00 ANNUAL PHYSICAL EXAM: Primary | ICD-10-CM

## 2019-07-15 PROCEDURE — 99395 PR PREVENTIVE VISIT,EST,18-39: ICD-10-PCS | Mod: S$GLB,,, | Performed by: INTERNAL MEDICINE

## 2019-07-15 PROCEDURE — 71046 X-RAY EXAM CHEST 2 VIEWS: CPT | Mod: 26,,, | Performed by: RADIOLOGY

## 2019-07-15 PROCEDURE — 99999 PR PBB SHADOW E&M-EST. PATIENT-LVL IV: ICD-10-PCS | Mod: PBBFAC,,, | Performed by: INTERNAL MEDICINE

## 2019-07-15 PROCEDURE — 99999 PR PBB SHADOW E&M-EST. PATIENT-LVL IV: CPT | Mod: PBBFAC,,, | Performed by: INTERNAL MEDICINE

## 2019-07-15 PROCEDURE — 71046 X-RAY EXAM CHEST 2 VIEWS: CPT | Mod: TC

## 2019-07-15 PROCEDURE — 99395 PREV VISIT EST AGE 18-39: CPT | Mod: S$GLB,,, | Performed by: INTERNAL MEDICINE

## 2019-07-15 PROCEDURE — 71046 XR CHEST PA AND LATERAL: ICD-10-PCS | Mod: 26,,, | Performed by: RADIOLOGY

## 2019-07-15 RX ORDER — ALBUTEROL SULFATE 90 UG/1
1 AEROSOL, METERED RESPIRATORY (INHALATION) EVERY 12 HOURS
Qty: 1 INHALER | Refills: 5 | Status: SHIPPED | OUTPATIENT
Start: 2019-07-15 | End: 2020-10-12 | Stop reason: SDUPTHER

## 2019-07-15 NOTE — PROGRESS NOTES
"Subjective:       Patient ID: Darci Vaughan Jr. is a 23 y.o. male.    Chief Complaint: Annual Exam   This is a 23-year-old who presents today for physical.  Patient reports he had been feeling tired but has been feeling a bit better recently .  He continues to watch his diet and reports is now more of a vegetarian.  Found that when his stomach bothers him he cut back on eating which made a difference more so than any of the medicines he took so he had previously seen GI and was on Nexium and Elavil but all those medicines he stopped did not find a made much difference.  His tried to modify his diet  He denies dysphagia. He gets ball sensation in stomach at times especially when he is working . He had previous history of bullous lung disease and intermittent asthma for which he has been following with Pulmonary he put reports he also stopped his inhalers in the past was on Dulera felt like he did really needed regularly he does want a refill of his albuterol to have on hand for flares he will occasionally get wheezing or chest discomfort with symptoms.  He denies significant depression or increased anxiety and did not wish to follow back up with Psychology or need for medication.      HPI  Review of Systems   Respiratory:        Occasional wheezing, chest discomfort   No increased sob    Musculoskeletal:        Occaional shoulder discomfort when lifting   No neck pain or radiculopathy    Psychiatric/Behavioral:        Denies depression  Feels anxiety better   Went to counseling once did not follow up        Objective:     Blood pressure 118/62, pulse 83, temperature 98 °F (36.7 °C), temperature source Oral, height 6' 2" (1.88 m), weight 61.1 kg (134 lb 11.2 oz), SpO2 99 %.  pox 99   Physical Exam   Constitutional: No distress.   Thin    HENT:   Head: Normocephalic.   Mouth/Throat: Oropharynx is clear and moist.   Eyes: No scleral icterus.   Neck: Neck supple.   Cardiovascular: Normal rate, regular rhythm and normal " heart sounds. Exam reveals no gallop and no friction rub.   No murmur heard.  Pulmonary/Chest: Effort normal and breath sounds normal. No respiratory distress.   Abdominal: Soft. Bowel sounds are normal. He exhibits no mass. There is no tenderness.   Musculoskeletal: He exhibits no edema.   Normal rom shoulder  Neck supple    Neurological: He is alert.   Skin: No erythema.   Vitals reviewed.      Assessment:       1. Annual physical exam    2. Lung disease, bullous    3. Mild intermittent asthma without complication    4. Hx of pneumothorax    5. Gastroesophageal reflux disease, esophagitis presence not specified        Plan:       Darci was seen today for annual exam.    Diagnoses and all orders for this visit:    Annual physical exam  -     X-Ray Chest PA And Lateral; Future    Lung disease, bullous  Mild intermittent asthma without complication  Hx pneumothorax   -     Ambulatory consult to Pulmonology  -     X-Ray Chest PA And Lateral; Future      Gastroesophageal reflux disease, esophagitis presence not specified  Hiatal hernia   Discussed with pt   He stopped his medications   Reinforced dietary measrues   disscussed consider resume ppi he will consider  otc discussed continue to avoid anti inflammatory medications     Refill provided   -     albuterol (PROVENTIL/VENTOLIN HFA) 90 mcg/actuation inhaler; Inhale 1 puff into the lungs every 12 (twelve) hours. And as needed for symptoms of wheezing and sob    Encouraged increased meal intake smaller meals throughout the day  And maintain dietary protein     Consider psychology/psychiatry  follow up discussed  for counseling he declines at this time   He declines immunizations

## 2019-08-06 ENCOUNTER — TELEPHONE (OUTPATIENT)
Dept: PULMONOLOGY | Facility: CLINIC | Age: 24
End: 2019-08-06

## 2019-08-06 DIAGNOSIS — J45.909 ASTHMA, UNSPECIFIED ASTHMA SEVERITY, UNSPECIFIED WHETHER COMPLICATED, UNSPECIFIED WHETHER PERSISTENT: Primary | ICD-10-CM

## 2019-08-14 ENCOUNTER — OFFICE VISIT (OUTPATIENT)
Dept: PULMONOLOGY | Facility: CLINIC | Age: 24
End: 2019-08-14
Payer: COMMERCIAL

## 2019-08-14 ENCOUNTER — HOSPITAL ENCOUNTER (OUTPATIENT)
Dept: PULMONOLOGY | Facility: CLINIC | Age: 24
Discharge: HOME | End: 2019-08-14
Payer: COMMERCIAL

## 2019-08-14 VITALS
SYSTOLIC BLOOD PRESSURE: 116 MMHG | HEART RATE: 90 BPM | WEIGHT: 131 LBS | OXYGEN SATURATION: 99 % | BODY MASS INDEX: 17.74 KG/M2 | HEIGHT: 72 IN | DIASTOLIC BLOOD PRESSURE: 82 MMHG

## 2019-08-14 DIAGNOSIS — J45.20 MILD INTERMITTENT ASTHMA WITHOUT COMPLICATION: Primary | ICD-10-CM

## 2019-08-14 DIAGNOSIS — J45.909 ASTHMA, UNSPECIFIED ASTHMA SEVERITY, UNSPECIFIED WHETHER COMPLICATED, UNSPECIFIED WHETHER PERSISTENT: ICD-10-CM

## 2019-08-14 LAB
PRE FEV1 FVC: 98
PRE FEV1: 4.51
PRE FVC: 4.62
PREDICTED FEV1 FVC: 85
PREDICTED FEV1: 4.45
PREDICTED FVC: 5.16

## 2019-08-14 PROCEDURE — 94727 PR PULM FUNCTION TEST BY GAS: ICD-10-PCS | Mod: S$GLB,,, | Performed by: INTERNAL MEDICINE

## 2019-08-14 PROCEDURE — 94010 BREATHING CAPACITY TEST: ICD-10-PCS | Mod: S$GLB,,, | Performed by: INTERNAL MEDICINE

## 2019-08-14 PROCEDURE — 94727 GAS DIL/WSHOT DETER LNG VOL: CPT | Mod: S$GLB,,, | Performed by: INTERNAL MEDICINE

## 2019-08-14 PROCEDURE — 99999 PR PBB SHADOW E&M-EST. PATIENT-LVL III: ICD-10-PCS | Mod: PBBFAC,,, | Performed by: INTERNAL MEDICINE

## 2019-08-14 PROCEDURE — 99999 PR PBB SHADOW E&M-EST. PATIENT-LVL III: CPT | Mod: PBBFAC,,, | Performed by: INTERNAL MEDICINE

## 2019-08-14 PROCEDURE — 94729 DIFFUSING CAPACITY: CPT | Mod: S$GLB,,, | Performed by: INTERNAL MEDICINE

## 2019-08-14 PROCEDURE — 3008F BODY MASS INDEX DOCD: CPT | Mod: CPTII,S$GLB,, | Performed by: INTERNAL MEDICINE

## 2019-08-14 PROCEDURE — 99213 OFFICE O/P EST LOW 20 MIN: CPT | Mod: S$GLB,,, | Performed by: INTERNAL MEDICINE

## 2019-08-14 PROCEDURE — 99213 PR OFFICE/OUTPT VISIT, EST, LEVL III, 20-29 MIN: ICD-10-PCS | Mod: S$GLB,,, | Performed by: INTERNAL MEDICINE

## 2019-08-14 PROCEDURE — 94010 BREATHING CAPACITY TEST: CPT | Mod: S$GLB,,, | Performed by: INTERNAL MEDICINE

## 2019-08-14 PROCEDURE — 3008F PR BODY MASS INDEX (BMI) DOCUMENTED: ICD-10-PCS | Mod: CPTII,S$GLB,, | Performed by: INTERNAL MEDICINE

## 2019-08-14 PROCEDURE — 94729 PR C02/MEMBANE DIFFUSE CAPACITY: ICD-10-PCS | Mod: S$GLB,,, | Performed by: INTERNAL MEDICINE

## 2019-08-14 NOTE — LETTER
August 14, 2019      Anita Rodrigues MD  [address]  [place] [address]           [facility]  [address]  [facility] [address]  Phone: [phone]          Patient: Darci Vaughan Jr.   MR Number: [id number]   YOB: 1995   Date of Visit: 8/14/2019       Dear Dr. Anita Rodrigues:    Thank you for referring Darci Vaughan to me for evaluation. Attached you will find relevant portions of my assessment and plan of care.    If you have questions, please do not hesitate to call me. I look forward to following Darci Vaughan along with you.    Sincerely,    Yolis Mejia MD    Enclosure  CC:  No Recipients    If you would like to receive this communication electronically, please contact [email or web address] or [phone] to request more information on [employer] access.    For providers and/or their staff who would like to refer a patient to [facility], please contact us through our one-stop-shop provider referral line, [facility] , at [phone].    If you feel you have received this communication in error or would no longer like to receive these types of communications, please e-mail [email or web address]

## 2019-08-14 NOTE — PROGRESS NOTES
Subjective:       Patient ID: Darci Vaughan Jr. is a 23 y.o. male.    Chief Complaint: Asthma    HPI:   Darci Vaughan Jr. is a 23 y.o. male who is followed for history of recurrent pneumothorax and mild intermittent asthma.    No ED or UC visits in the last year.   He has been without his albuterol inhaler for the last few months.   Wheezing or chest tightness noted 3-4 times a week.   Resistant to taking a controller medication (prescribed Dulera in the past, but did not take it because he does not like taking those kinds of medications).     Not exercising regularly.  Current working in retail at [employer].       Patient History:    CT chest 8/2/17: Relatively unremarkable.  Evidence of a left apical bullectomy.  No cystic lung disease.  No interstitial fibrosis.  No mediastinal or pneumoperitoneum.  As per radiology read no evidence of total pleurodesis.  Additionally, the patient was started on albuterol twice a day to decrease hyperinflation.  He took the albuterol a few times, but complained of chest pain with inhalation and discontinued it.  He has stopped working all together because he has pain in his chest when lifting anything 'heavy'.  Reports pleuritic chest pain with deep inspiration.  Now reporting SMITH when walking up an incline.  Denies associated wheezing.  He has quit playing basketball entirely.  He denies depression, but his affect is subdued.      Initial History:   In 2012, the patient was taking a bath when he noted a sharp pain on the left side of his chest that radiated upwards towards the shoulder.  He suddenly became short of breath.  [place] [facility] [place]aging showed you had a pneumothorax.  He received a small bore chest tube was place and he was admitted to the hospital.  His lung re-expanded, the tube was removed and he was discharged from the hospital.     Approximately 1 month later, he was at school- studying, not in any sports like activity- and similar symptoms  recurred.  He was again taken to the ED, found to have a left sided pnx and chest tube was placed and he underwent VATS apical resection, blebectomy, and pleurodesis.      He has had no pneumothorax since that time, but reports that he has had similar symptoms that he has chosen to wait out.   Pain is mostly on the left side, but occasionally on the right.  Describes the pain sharp, stabbing.  Associated with wheezing.  No exacerbating factors.  No associated cough.  Lasts 30-60 sec and then self resolved.  Somewhat pleuritic in nature.      Review of Systems   Constitutional: Negative for fever, chills and activity change.   HENT: Negative for postnasal drip, rhinorrhea, sinus pressure and congestion.    Respiratory: Positive for chest tightness and wheezing (ocassionally). Negative for cough, hemoptysis, shortness of breath and dyspnea on extertion.    Cardiovascular: Negative for chest pain and leg swelling.   Genitourinary: Negative for difficulty urinating.   Endocrine: Negative for cold intolerance and heat intolerance.    Musculoskeletal: Negative for joint swelling and myalgias.   Gastrointestinal: Negative for nausea, vomiting and abdominal pain.   Neurological: Negative for headaches.   Hematological: Negative for adenopathy. No excessive bruising.   Psychiatric/Behavioral: Negative for confusion and sleep disturbance.         Social History     Tobacco Use    Smoking status: Passive Smoke Exposure - Never Smoker    Smokeless tobacco: Former User   Substance Use Topics    Alcohol use: No       Review of patient's allergies indicates:  No Known Allergies  Past Medical History:   Diagnosis Date    Anxiety     Asthma     Atelectasis     Chronic constipation     GERD (gastroesophageal reflux disease)     Spontaneous pneumothorax 10/2012     Past Surgical History:   Procedure Laterality Date    BLEBECTOMY  Dec 2012    for recurrent spontaneous L pneumothorax    CHEST TUBE INSERTION       ESOPHAGOGASTRODUODENOSCOPY (EGD) N/A 8/30/2017    Performed by Gómez Woodward MD at [facility] ENDO (4TH FLR)    MANOMETRY-ESOPHAGEAL-WITH IMPEDANCE N/A 12/27/2017    Performed by Gómez Woodward [facility] at [facility] ENDO (4TH FLR)    UPPER GASTROINTESTINAL ENDOSCOPY      VATS (VIDEO-ASSISTED THORACOSCOPIC SURGERY) Left 12/17/2012    Performed by Ben Huggins MD at [facility] OR 2ND FLR     Current Outpatient Medications on File Prior to Visit   Medication Sig    albuterol (PROVENTIL/VENTOLIN HFA) 90 mcg/actuation inhaler Inhale 1 puff into the lungs every 12 (twelve) hours. And as needed for symptoms of wheezing and sob     No current facility-administered medications on file prior to visit.        Objective:      Physical Exam   Constitutional: He is oriented to person, place, and time. He appears well-developed. No distress.   Very thin   HENT:   Head: Normocephalic.   Mouth/Throat: Mallampati Score: IV.   Neck: Normal range of motion.   Cardiovascular: Normal rate, regular rhythm and normal heart sounds.   No murmur heard.  Pulmonary/Chest: Hyperinflation, effort normal and breath sounds normal. He has no wheezes. He has no rhonchi. He has no rales. He exhibits no tenderness.   Abdominal: Soft. Bowel sounds are normal. He exhibits no distension. There is no tenderness.   Musculoskeletal: Normal range of motion. He exhibits no edema.   Neurological: He is alert and oriented to person, place, and time.   Skin: Skin is warm and dry. He is not diaphoretic. No cyanosis. Nails show no clubbing.   Psychiatric: He has a normal mood and affect. His behavior is normal. Judgment and thought content normal.   Vitals reviewed.    Personal Diagnostic Review  PFTs: 7/25/17: no obstruction.  Evidence of hyperinflation.  Normal DLCO  Pulmonary Function tests:  08/14/2019:  No evidence of obstruction, no restriction.  Supra normal DLCO.  Chest x-ray:  07/15/2019:  No acute pulmonary disease noted. Borderline hyperinflation.      Assessment:       1.  Mild intermittent asthma without complication        Plan:     Problem List Items Addressed This Visit        Pulmonary    Mild intermittent asthma without complication - Primary    Overview     Stable PFTs.  Continue albuterol prn.  No need for routine pulmonary follow-up.           Current Assessment & Plan     · Continue albuterol p.r.n..  · Advised patient that he may progress to needing a controller medication in the future and should be open to the idea.  Requested he monitor how frequently he is requiring his rescue inhaler.  Should he require his inhaler every day, several times a day he will most certainly need adjustments of his medication and he was directed to contact the office.  · Recommend that he increase physical activity to maintain fitness.

## 2019-08-14 NOTE — ASSESSMENT & PLAN NOTE
· Continue albuterol p.r.n..  · Advised patient that he may progress to needing a controller medication in the future and should be open to the idea.  Requested he monitor how frequently he is requiring his rescue inhaler.  Should he require his inhaler every day, several times a day he will most certainly need adjustments of his medication and he was directed to contact the office.  · Recommend that he increase physical activity to maintain fitness.

## 2020-10-12 ENCOUNTER — OFFICE VISIT (OUTPATIENT)
Dept: INTERNAL MEDICINE | Facility: CLINIC | Age: 25
End: 2020-10-12
Payer: COMMERCIAL

## 2020-10-12 VITALS
HEIGHT: 74 IN | SYSTOLIC BLOOD PRESSURE: 116 MMHG | BODY MASS INDEX: 16.86 KG/M2 | WEIGHT: 131.38 LBS | HEART RATE: 64 BPM | OXYGEN SATURATION: 100 % | DIASTOLIC BLOOD PRESSURE: 80 MMHG

## 2020-10-12 DIAGNOSIS — Z11.4 SCREENING FOR HIV (HUMAN IMMUNODEFICIENCY VIRUS): ICD-10-CM

## 2020-10-12 DIAGNOSIS — J45.20 MILD INTERMITTENT ASTHMA WITHOUT COMPLICATION: ICD-10-CM

## 2020-10-12 DIAGNOSIS — Z11.59 NEED FOR HEPATITIS C SCREENING TEST: ICD-10-CM

## 2020-10-12 DIAGNOSIS — Z00.00 ANNUAL PHYSICAL EXAM: Primary | ICD-10-CM

## 2020-10-12 PROCEDURE — 99395 PR PREVENTIVE VISIT,EST,18-39: ICD-10-PCS | Mod: S$GLB,,, | Performed by: INTERNAL MEDICINE

## 2020-10-12 PROCEDURE — 99999 PR PBB SHADOW E&M-EST. PATIENT-LVL III: ICD-10-PCS | Mod: PBBFAC,,, | Performed by: INTERNAL MEDICINE

## 2020-10-12 PROCEDURE — 99395 PREV VISIT EST AGE 18-39: CPT | Mod: S$GLB,,, | Performed by: INTERNAL MEDICINE

## 2020-10-12 PROCEDURE — 99999 PR PBB SHADOW E&M-EST. PATIENT-LVL III: CPT | Mod: PBBFAC,,, | Performed by: INTERNAL MEDICINE

## 2020-10-12 RX ORDER — ALBUTEROL SULFATE 90 UG/1
1 AEROSOL, METERED RESPIRATORY (INHALATION) EVERY 12 HOURS
Qty: 6.7 G | Refills: 11 | Status: SHIPPED | OUTPATIENT
Start: 2020-10-12 | End: 2021-05-25 | Stop reason: SDUPTHER

## 2020-10-12 NOTE — PROGRESS NOTES
Answers for HPI/ROS submitted by the patient on 10/9/2020   activity change: No  unexpected weight change: No  neck pain: Yes  hearing loss: No  rhinorrhea: No  trouble swallowing: No  eye discharge: No  visual disturbance: No  chest tightness: No  wheezing: No  chest pain: Yes  palpitations: No  blood in stool: No  constipation: No  vomiting: No  diarrhea: No  polydipsia: No  polyuria: No  difficulty urinating: No  urgency: No  hematuria: No  joint swelling: No  arthralgias: Yes  headaches: No  weakness: Yes  confusion: Yes  dysphoric mood: Yes  Subjective:       Patient ID: Darci Vaughan Jr. is a 24 y.o. male.    Chief Complaint: Annual Exam   This is a 24-year-old who presents today follow-up.  Patient reports that he has been doing fairly well his has been controlled he uses albuterol as needed gets occasional flares the summer and a few weeks ago had to use it but not on a regular basis.  He has modified his diet tries to eat less meat and more vegetables and has done better his GI symptoms he denies abdominal pain or difficulty swallowing.  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  His he has issues with stress and anxiety in the past seen Psychology but did not wish to follow-up at this time does feel the need for medication.    HPI  Review of Systems   Constitutional: Negative for activity change and unexpected weight change.   HENT: Negative for hearing loss, rhinorrhea and trouble swallowing.    Eyes: Negative for discharge and visual disturbance.   Respiratory: Negative for chest tightness and wheezing.    Cardiovascular: Positive for chest pain. Negative for palpitations.   Gastrointestinal: Negative for blood in stool, constipation, diarrhea and vomiting.   Endocrine: Negative for polydipsia and polyuria.    Genitourinary: Negative for difficulty urinating, hematuria and urgency.   Musculoskeletal: Positive for arthralgias and neck pain. Negative for joint swelling.   Neurological: Positive for weakness. Negative for headaches.   Psychiatric/Behavioral: Positive for confusion and dysphoric mood.       Objective:      Physical Exam  Constitutional:       General: He is not in acute distress.     Comments: Thin    HENT:      Head: Normocephalic.   Eyes:      General: No scleral icterus.  Neck:      Musculoskeletal: Neck supple.   Cardiovascular:      Rate and Rhythm: Normal rate and regular rhythm.      Heart sounds: Normal heart sounds. No murmur. No friction rub. No gallop.    Pulmonary:      Effort: Pulmonary effort is normal. No respiratory distress.      Breath sounds: Normal breath sounds.      Comments: Lungs clear   Abdominal:      General: Bowel sounds are normal.      Palpations: Abdomen is soft. There is no mass.      Tenderness: There is no abdominal tenderness.   Genitourinary:     Comments: No henia   Skin:     Findings: No erythema.   Neurological:      Mental Status: He is alert.         Assessment:       1. Annual physical exam    2. Need for hepatitis C screening test    3. Screening for HIV (human immunodeficiency virus)    4. Mild intermittent asthma without complication        Plan:       Darci was seen today for annual exam.    Diagnoses and all orders for this visit:    Annual physical exam  -     CBC auto differential; Future  -     Comprehensive Metabolic Panel; Future  -     Hepatic Function Panel; Future  -     Lipid Panel; Future  -     TSH; Future    Need for hepatitis C screening test  -     Hepatitis C Antibody; Future    Screening for HIV (human immunodeficiency virus)  -     HIV 1/2 Ag/Ab (4th Gen); Future    Mild intermittent asthma without complication  History of currently doing well with just albuterol on rare occasion if needed can consider follow-up pulmonary if increasing  concerns develop  -     albuterol (PROVENTIL/VENTOLIN HFA) 90 mcg/actuation inhaler; Inhale 1 puff into the lungs every 12 (twelve) hours. And as needed for symptoms of wheezing and sob    Hx hiatal hernia gerd asymptomatic currently     We discussed lifting precautions he is currently asymptomatic    We discussed healthy dietary measures  Situational stress with history of anxiety declines pscyhology follow up will call if he feels the need to do so  He declines immunizations reports his up-to-date on tetanus vaccine

## 2021-04-26 ENCOUNTER — LAB VISIT (OUTPATIENT)
Dept: LAB | Facility: HOSPITAL | Age: 26
End: 2021-04-26
Attending: INTERNAL MEDICINE
Payer: COMMERCIAL

## 2021-04-26 ENCOUNTER — OFFICE VISIT (OUTPATIENT)
Dept: INTERNAL MEDICINE | Facility: CLINIC | Age: 26
End: 2021-04-26
Payer: COMMERCIAL

## 2021-04-26 VITALS
DIASTOLIC BLOOD PRESSURE: 80 MMHG | SYSTOLIC BLOOD PRESSURE: 118 MMHG | HEIGHT: 74 IN | BODY MASS INDEX: 16.24 KG/M2 | WEIGHT: 126.56 LBS | OXYGEN SATURATION: 100 % | HEART RATE: 89 BPM

## 2021-04-26 DIAGNOSIS — R10.9 ABDOMINAL PAIN, UNSPECIFIED ABDOMINAL LOCATION: Primary | ICD-10-CM

## 2021-04-26 DIAGNOSIS — R10.9 ABDOMINAL PAIN, UNSPECIFIED ABDOMINAL LOCATION: ICD-10-CM

## 2021-04-26 DIAGNOSIS — K21.9 GASTROESOPHAGEAL REFLUX DISEASE, UNSPECIFIED WHETHER ESOPHAGITIS PRESENT: ICD-10-CM

## 2021-04-26 LAB
ALBUMIN SERPL BCP-MCNC: 4.5 G/DL (ref 3.5–5.2)
ALP SERPL-CCNC: 208 U/L (ref 55–135)
ALT SERPL W/O P-5'-P-CCNC: 12 U/L (ref 10–44)
ANION GAP SERPL CALC-SCNC: 8 MMOL/L (ref 8–16)
AST SERPL-CCNC: 19 U/L (ref 10–40)
BASOPHILS # BLD AUTO: 0.04 K/UL (ref 0–0.2)
BASOPHILS NFR BLD: 0.7 % (ref 0–1.9)
BILIRUB DIRECT SERPL-MCNC: 0.4 MG/DL (ref 0.1–0.3)
BILIRUB SERPL-MCNC: 0.8 MG/DL (ref 0.1–1)
BUN SERPL-MCNC: 3 MG/DL (ref 6–20)
CALCIUM SERPL-MCNC: 7.6 MG/DL (ref 8.7–10.5)
CHLORIDE SERPL-SCNC: 103 MMOL/L (ref 95–110)
CO2 SERPL-SCNC: 28 MMOL/L (ref 23–29)
CREAT SERPL-MCNC: 0.7 MG/DL (ref 0.5–1.4)
DIFFERENTIAL METHOD: NORMAL
EOSINOPHIL # BLD AUTO: 0.1 K/UL (ref 0–0.5)
EOSINOPHIL NFR BLD: 1.4 % (ref 0–8)
ERYTHROCYTE [DISTWIDTH] IN BLOOD BY AUTOMATED COUNT: 13.5 % (ref 11.5–14.5)
EST. GFR  (AFRICAN AMERICAN): >60 ML/MIN/1.73 M^2
EST. GFR  (NON AFRICAN AMERICAN): >60 ML/MIN/1.73 M^2
GLUCOSE SERPL-MCNC: 84 MG/DL (ref 70–110)
HCT VFR BLD AUTO: 47.9 % (ref 40–54)
HGB BLD-MCNC: 15.5 G/DL (ref 14–18)
IMM GRANULOCYTES # BLD AUTO: 0.01 K/UL (ref 0–0.04)
IMM GRANULOCYTES NFR BLD AUTO: 0.2 % (ref 0–0.5)
LYMPHOCYTES # BLD AUTO: 2 K/UL (ref 1–4.8)
LYMPHOCYTES NFR BLD: 36.4 % (ref 18–48)
MCH RBC QN AUTO: 29 PG (ref 27–31)
MCHC RBC AUTO-ENTMCNC: 32.4 G/DL (ref 32–36)
MCV RBC AUTO: 90 FL (ref 82–98)
MONOCYTES # BLD AUTO: 0.8 K/UL (ref 0.3–1)
MONOCYTES NFR BLD: 13.6 % (ref 4–15)
NEUTROPHILS # BLD AUTO: 2.7 K/UL (ref 1.8–7.7)
NEUTROPHILS NFR BLD: 47.7 % (ref 38–73)
NRBC BLD-RTO: 0 /100 WBC
PLATELET # BLD AUTO: 257 K/UL (ref 150–450)
PMV BLD AUTO: 10.8 FL (ref 9.2–12.9)
POTASSIUM SERPL-SCNC: 4.1 MMOL/L (ref 3.5–5.1)
PROT SERPL-MCNC: 7.5 G/DL (ref 6–8.4)
RBC # BLD AUTO: 5.34 M/UL (ref 4.6–6.2)
SODIUM SERPL-SCNC: 139 MMOL/L (ref 136–145)
WBC # BLD AUTO: 5.57 K/UL (ref 3.9–12.7)

## 2021-04-26 PROCEDURE — 85025 COMPLETE CBC W/AUTO DIFF WBC: CPT | Performed by: INTERNAL MEDICINE

## 2021-04-26 PROCEDURE — 99999 PR PBB SHADOW E&M-EST. PATIENT-LVL III: CPT | Mod: PBBFAC,,, | Performed by: INTERNAL MEDICINE

## 2021-04-26 PROCEDURE — 99213 OFFICE O/P EST LOW 20 MIN: CPT | Mod: S$GLB,,, | Performed by: INTERNAL MEDICINE

## 2021-04-26 PROCEDURE — 80076 HEPATIC FUNCTION PANEL: CPT | Performed by: INTERNAL MEDICINE

## 2021-04-26 PROCEDURE — 3008F BODY MASS INDEX DOCD: CPT | Mod: CPTII,S$GLB,, | Performed by: INTERNAL MEDICINE

## 2021-04-26 PROCEDURE — 80048 BASIC METABOLIC PNL TOTAL CA: CPT | Performed by: INTERNAL MEDICINE

## 2021-04-26 PROCEDURE — 36415 COLL VENOUS BLD VENIPUNCTURE: CPT | Performed by: INTERNAL MEDICINE

## 2021-04-26 PROCEDURE — 99213 PR OFFICE/OUTPT VISIT, EST, LEVL III, 20-29 MIN: ICD-10-PCS | Mod: S$GLB,,, | Performed by: INTERNAL MEDICINE

## 2021-04-26 PROCEDURE — 3008F PR BODY MASS INDEX (BMI) DOCUMENTED: ICD-10-PCS | Mod: CPTII,S$GLB,, | Performed by: INTERNAL MEDICINE

## 2021-04-26 PROCEDURE — 99999 PR PBB SHADOW E&M-EST. PATIENT-LVL III: ICD-10-PCS | Mod: PBBFAC,,, | Performed by: INTERNAL MEDICINE

## 2021-04-26 RX ORDER — FAMOTIDINE 20 MG/1
20 TABLET, FILM COATED ORAL 2 TIMES DAILY PRN
Qty: 60 TABLET | Refills: 1 | Status: SHIPPED | OUTPATIENT
Start: 2021-04-26 | End: 2022-08-24

## 2021-04-27 ENCOUNTER — TELEPHONE (OUTPATIENT)
Dept: INTERNAL MEDICINE | Facility: CLINIC | Age: 26
End: 2021-04-27

## 2021-04-27 DIAGNOSIS — E83.51 HYPOCALCEMIA: Primary | ICD-10-CM

## 2021-04-30 ENCOUNTER — HOSPITAL ENCOUNTER (INPATIENT)
Facility: HOSPITAL | Age: 26
LOS: 11 days | Discharge: HOME | DRG: 200 | End: 2021-05-11
Attending: EMERGENCY MEDICINE | Admitting: THORACIC SURGERY (CARDIOTHORACIC VASCULAR SURGERY)
Payer: COMMERCIAL

## 2021-04-30 ENCOUNTER — PATIENT MESSAGE (OUTPATIENT)
Dept: INTERNAL MEDICINE | Facility: CLINIC | Age: 26
End: 2021-04-30

## 2021-04-30 DIAGNOSIS — R06.02 SHORTNESS OF BREATH: ICD-10-CM

## 2021-04-30 DIAGNOSIS — R06.02 SOB (SHORTNESS OF BREATH): ICD-10-CM

## 2021-04-30 DIAGNOSIS — J93.9 PNEUMOTHORAX, LEFT: Primary | ICD-10-CM

## 2021-04-30 DIAGNOSIS — J43.9 LUNG BLEBS: ICD-10-CM

## 2021-04-30 LAB
ANION GAP SERPL CALC-SCNC: 12 MMOL/L (ref 8–16)
BASOPHILS # BLD AUTO: 0.05 K/UL (ref 0–0.2)
BASOPHILS NFR BLD: 1 % (ref 0–1.9)
BUN SERPL-MCNC: 2 MG/DL (ref 6–20)
CALCIUM SERPL-MCNC: 8.1 MG/DL (ref 8.7–10.5)
CHLORIDE SERPL-SCNC: 99 MMOL/L (ref 95–110)
CO2 SERPL-SCNC: 23 MMOL/L (ref 23–29)
CREAT SERPL-MCNC: 0.8 MG/DL (ref 0.5–1.4)
CTP QC/QA: YES
DIFFERENTIAL METHOD: NORMAL
EOSINOPHIL # BLD AUTO: 0.1 K/UL (ref 0–0.5)
EOSINOPHIL NFR BLD: 1.8 % (ref 0–8)
ERYTHROCYTE [DISTWIDTH] IN BLOOD BY AUTOMATED COUNT: 13.2 % (ref 11.5–14.5)
EST. GFR  (AFRICAN AMERICAN): >60 ML/MIN/1.73 M^2
EST. GFR  (NON AFRICAN AMERICAN): >60 ML/MIN/1.73 M^2
GLUCOSE SERPL-MCNC: 87 MG/DL (ref 70–110)
HCT VFR BLD AUTO: 48.1 % (ref 40–54)
HGB BLD-MCNC: 16 G/DL (ref 14–18)
IMM GRANULOCYTES # BLD AUTO: 0.01 K/UL (ref 0–0.04)
IMM GRANULOCYTES NFR BLD AUTO: 0.2 % (ref 0–0.5)
INR PPP: 1.1 (ref 0.8–1.2)
LYMPHOCYTES # BLD AUTO: 1.7 K/UL (ref 1–4.8)
LYMPHOCYTES NFR BLD: 32.9 % (ref 18–48)
MCH RBC QN AUTO: 29.3 PG (ref 27–31)
MCHC RBC AUTO-ENTMCNC: 33.3 G/DL (ref 32–36)
MCV RBC AUTO: 88 FL (ref 82–98)
MONOCYTES # BLD AUTO: 0.8 K/UL (ref 0.3–1)
MONOCYTES NFR BLD: 14.7 % (ref 4–15)
NEUTROPHILS # BLD AUTO: 2.5 K/UL (ref 1.8–7.7)
NEUTROPHILS NFR BLD: 49.4 % (ref 38–73)
NRBC BLD-RTO: 0 /100 WBC
PLATELET # BLD AUTO: 245 K/UL (ref 150–450)
PMV BLD AUTO: 9.9 FL (ref 9.2–12.9)
POTASSIUM SERPL-SCNC: 4 MMOL/L (ref 3.5–5.1)
PROTHROMBIN TIME: 12.4 SEC (ref 9–12.5)
RBC # BLD AUTO: 5.46 M/UL (ref 4.6–6.2)
SARS-COV-2 RDRP RESP QL NAA+PROBE: NEGATIVE
SODIUM SERPL-SCNC: 134 MMOL/L (ref 136–145)
TROPONIN I SERPL DL<=0.01 NG/ML-MCNC: <0.006 NG/ML (ref 0–0.03)
WBC # BLD AUTO: 5.11 K/UL (ref 3.9–12.7)

## 2021-04-30 PROCEDURE — 99285 EMERGENCY DEPT VISIT HI MDM: CPT | Mod: CS,,, | Performed by: EMERGENCY MEDICINE

## 2021-04-30 PROCEDURE — 63600175 PHARM REV CODE 636 W HCPCS: Performed by: PHYSICIAN ASSISTANT

## 2021-04-30 PROCEDURE — 25000003 PHARM REV CODE 250: Performed by: PHYSICIAN ASSISTANT

## 2021-04-30 PROCEDURE — 99285 EMERGENCY DEPT VISIT HI MDM: CPT | Mod: 25

## 2021-04-30 PROCEDURE — 99223 PR INITIAL HOSPITAL CARE,LEVL III: ICD-10-PCS | Mod: ,,, | Performed by: THORACIC SURGERY (CARDIOTHORACIC VASCULAR SURGERY)

## 2021-04-30 PROCEDURE — 85025 COMPLETE CBC W/AUTO DIFF WBC: CPT | Performed by: EMERGENCY MEDICINE

## 2021-04-30 PROCEDURE — 85610 PROTHROMBIN TIME: CPT | Performed by: THORACIC SURGERY (CARDIOTHORACIC VASCULAR SURGERY)

## 2021-04-30 PROCEDURE — 99223 1ST HOSP IP/OBS HIGH 75: CPT | Mod: ,,, | Performed by: THORACIC SURGERY (CARDIOTHORACIC VASCULAR SURGERY)

## 2021-04-30 PROCEDURE — 99285 PR EMERGENCY DEPT VISIT,LEVEL V: ICD-10-PCS | Mod: CS,,, | Performed by: EMERGENCY MEDICINE

## 2021-04-30 PROCEDURE — 93005 ELECTROCARDIOGRAM TRACING: CPT

## 2021-04-30 PROCEDURE — 25000003 PHARM REV CODE 250: Performed by: STUDENT IN AN ORGANIZED HEALTH CARE EDUCATION/TRAINING PROGRAM

## 2021-04-30 PROCEDURE — 63600175 PHARM REV CODE 636 W HCPCS: Performed by: STUDENT IN AN ORGANIZED HEALTH CARE EDUCATION/TRAINING PROGRAM

## 2021-04-30 PROCEDURE — 99900035 HC TECH TIME PER 15 MIN (STAT)

## 2021-04-30 PROCEDURE — 84484 ASSAY OF TROPONIN QUANT: CPT | Performed by: EMERGENCY MEDICINE

## 2021-04-30 PROCEDURE — 94761 N-INVAS EAR/PLS OXIMETRY MLT: CPT

## 2021-04-30 PROCEDURE — 93010 ELECTROCARDIOGRAM REPORT: CPT | Mod: ,,, | Performed by: INTERNAL MEDICINE

## 2021-04-30 PROCEDURE — U0002 COVID-19 LAB TEST NON-CDC: HCPCS | Performed by: EMERGENCY MEDICINE

## 2021-04-30 PROCEDURE — 20600001 HC STEP DOWN PRIVATE ROOM

## 2021-04-30 PROCEDURE — 36415 COLL VENOUS BLD VENIPUNCTURE: CPT | Performed by: THORACIC SURGERY (CARDIOTHORACIC VASCULAR SURGERY)

## 2021-04-30 PROCEDURE — 93010 EKG 12-LEAD: ICD-10-PCS | Mod: ,,, | Performed by: INTERNAL MEDICINE

## 2021-04-30 PROCEDURE — 80048 BASIC METABOLIC PNL TOTAL CA: CPT | Performed by: EMERGENCY MEDICINE

## 2021-04-30 RX ORDER — MIDAZOLAM HYDROCHLORIDE 1 MG/ML
INJECTION INTRAMUSCULAR; INTRAVENOUS CODE/TRAUMA/SEDATION MEDICATION
Status: COMPLETED | OUTPATIENT
Start: 2021-04-30 | End: 2021-04-30

## 2021-04-30 RX ORDER — HYDROCODONE BITARTRATE AND ACETAMINOPHEN 5; 325 MG/1; MG/1
1 TABLET ORAL EVERY 4 HOURS PRN
Status: DISCONTINUED | OUTPATIENT
Start: 2021-04-30 | End: 2021-05-04

## 2021-04-30 RX ORDER — SODIUM CHLORIDE, SODIUM LACTATE, POTASSIUM CHLORIDE, CALCIUM CHLORIDE 600; 310; 30; 20 MG/100ML; MG/100ML; MG/100ML; MG/100ML
INJECTION, SOLUTION INTRAVENOUS CONTINUOUS
Status: DISCONTINUED | OUTPATIENT
Start: 2021-04-30 | End: 2021-04-30

## 2021-04-30 RX ORDER — SODIUM CHLORIDE 0.9 % (FLUSH) 0.9 %
10 SYRINGE (ML) INJECTION
Status: DISCONTINUED | OUTPATIENT
Start: 2021-04-30 | End: 2021-05-11 | Stop reason: HOSPADM

## 2021-04-30 RX ORDER — HYDROCODONE BITARTRATE AND ACETAMINOPHEN 10; 325 MG/1; MG/1
1 TABLET ORAL EVERY 4 HOURS PRN
Status: DISCONTINUED | OUTPATIENT
Start: 2021-04-30 | End: 2021-05-04

## 2021-04-30 RX ORDER — HYDROMORPHONE HYDROCHLORIDE 1 MG/ML
0.5 INJECTION, SOLUTION INTRAMUSCULAR; INTRAVENOUS; SUBCUTANEOUS ONCE
Status: COMPLETED | OUTPATIENT
Start: 2021-04-30 | End: 2021-04-30

## 2021-04-30 RX ORDER — ONDANSETRON 8 MG/1
8 TABLET, ORALLY DISINTEGRATING ORAL EVERY 8 HOURS PRN
Status: DISCONTINUED | OUTPATIENT
Start: 2021-04-30 | End: 2021-05-11 | Stop reason: HOSPADM

## 2021-04-30 RX ORDER — ACETAMINOPHEN 325 MG/1
650 TABLET ORAL EVERY 8 HOURS PRN
Status: DISCONTINUED | OUTPATIENT
Start: 2021-04-30 | End: 2021-05-11 | Stop reason: HOSPADM

## 2021-04-30 RX ORDER — TALC
6 POWDER (GRAM) TOPICAL NIGHTLY PRN
Status: DISCONTINUED | OUTPATIENT
Start: 2021-04-30 | End: 2021-05-11 | Stop reason: HOSPADM

## 2021-04-30 RX ORDER — FENTANYL CITRATE 50 UG/ML
INJECTION, SOLUTION INTRAMUSCULAR; INTRAVENOUS CODE/TRAUMA/SEDATION MEDICATION
Status: COMPLETED | OUTPATIENT
Start: 2021-04-30 | End: 2021-04-30

## 2021-04-30 RX ORDER — HYDROMORPHONE HYDROCHLORIDE 1 MG/ML
INJECTION, SOLUTION INTRAMUSCULAR; INTRAVENOUS; SUBCUTANEOUS CODE/TRAUMA/SEDATION MEDICATION
Status: COMPLETED | OUTPATIENT
Start: 2021-04-30 | End: 2021-04-30

## 2021-04-30 RX ORDER — HYDROMORPHONE HYDROCHLORIDE 1 MG/ML
0.5 INJECTION, SOLUTION INTRAMUSCULAR; INTRAVENOUS; SUBCUTANEOUS EVERY 4 HOURS PRN
Status: DISCONTINUED | OUTPATIENT
Start: 2021-04-30 | End: 2021-05-05

## 2021-04-30 RX ADMIN — MIDAZOLAM HYDROCHLORIDE 1 MG: 1 INJECTION, SOLUTION INTRAMUSCULAR; INTRAVENOUS at 03:04

## 2021-04-30 RX ADMIN — HYDROCODONE BITARTRATE AND ACETAMINOPHEN 1 TABLET: 10; 325 TABLET ORAL at 09:04

## 2021-04-30 RX ADMIN — HYDROMORPHONE HYDROCHLORIDE 0.5 MG: 1 INJECTION, SOLUTION INTRAMUSCULAR; INTRAVENOUS; SUBCUTANEOUS at 05:04

## 2021-04-30 RX ADMIN — HYDROMORPHONE HYDROCHLORIDE 1 MG: 1 INJECTION, SOLUTION INTRAMUSCULAR; INTRAVENOUS; SUBCUTANEOUS at 03:04

## 2021-04-30 RX ADMIN — FENTANYL CITRATE 50 MCG: 50 INJECTION, SOLUTION INTRAMUSCULAR; INTRAVENOUS at 03:04

## 2021-04-30 RX ADMIN — HYDROCODONE BITARTRATE AND ACETAMINOPHEN 1 TABLET: 10; 325 TABLET ORAL at 05:04

## 2021-04-30 RX ADMIN — DOXYCYCLINE 500 MG: 100 INJECTION, POWDER, LYOPHILIZED, FOR SOLUTION INTRAVENOUS at 03:04

## 2021-04-30 RX ADMIN — HYDROCODONE BITARTRATE AND ACETAMINOPHEN 1 TABLET: 10; 325 TABLET ORAL at 11:04

## 2021-04-30 RX ADMIN — HYDROMORPHONE HYDROCHLORIDE 0.5 MG: 1 INJECTION, SOLUTION INTRAMUSCULAR; INTRAVENOUS; SUBCUTANEOUS at 07:04

## 2021-04-30 RX ADMIN — SODIUM CHLORIDE, SODIUM LACTATE, POTASSIUM CHLORIDE, AND CALCIUM CHLORIDE: .6; .31; .03; .02 INJECTION, SOLUTION INTRAVENOUS at 09:04

## 2021-05-01 PROCEDURE — 20600001 HC STEP DOWN PRIVATE ROOM

## 2021-05-01 PROCEDURE — 25000003 PHARM REV CODE 250: Performed by: STUDENT IN AN ORGANIZED HEALTH CARE EDUCATION/TRAINING PROGRAM

## 2021-05-01 PROCEDURE — 99232 PR SUBSEQUENT HOSPITAL CARE,LEVL II: ICD-10-PCS | Mod: ,,, | Performed by: THORACIC SURGERY (CARDIOTHORACIC VASCULAR SURGERY)

## 2021-05-01 PROCEDURE — 99232 SBSQ HOSP IP/OBS MODERATE 35: CPT | Mod: ,,, | Performed by: THORACIC SURGERY (CARDIOTHORACIC VASCULAR SURGERY)

## 2021-05-01 RX ADMIN — HYDROCODONE BITARTRATE AND ACETAMINOPHEN 1 TABLET: 10; 325 TABLET ORAL at 03:05

## 2021-05-01 RX ADMIN — HYDROCODONE BITARTRATE AND ACETAMINOPHEN 1 TABLET: 10; 325 TABLET ORAL at 09:05

## 2021-05-02 PROCEDURE — 99231 SBSQ HOSP IP/OBS SF/LOW 25: CPT | Mod: ,,, | Performed by: THORACIC SURGERY (CARDIOTHORACIC VASCULAR SURGERY)

## 2021-05-02 PROCEDURE — 20600001 HC STEP DOWN PRIVATE ROOM

## 2021-05-02 PROCEDURE — 99231 PR SUBSEQUENT HOSPITAL CARE,LEVL I: ICD-10-PCS | Mod: ,,, | Performed by: THORACIC SURGERY (CARDIOTHORACIC VASCULAR SURGERY)

## 2021-05-02 PROCEDURE — 25000003 PHARM REV CODE 250: Performed by: STUDENT IN AN ORGANIZED HEALTH CARE EDUCATION/TRAINING PROGRAM

## 2021-05-02 RX ADMIN — HYDROCODONE BITARTRATE AND ACETAMINOPHEN 1 TABLET: 10; 325 TABLET ORAL at 08:05

## 2021-05-03 PROCEDURE — 99231 PR SUBSEQUENT HOSPITAL CARE,LEVL I: ICD-10-PCS | Mod: ,,, | Performed by: THORACIC SURGERY (CARDIOTHORACIC VASCULAR SURGERY)

## 2021-05-03 PROCEDURE — 94761 N-INVAS EAR/PLS OXIMETRY MLT: CPT

## 2021-05-03 PROCEDURE — 20600001 HC STEP DOWN PRIVATE ROOM

## 2021-05-03 PROCEDURE — 99231 SBSQ HOSP IP/OBS SF/LOW 25: CPT | Mod: ,,, | Performed by: THORACIC SURGERY (CARDIOTHORACIC VASCULAR SURGERY)

## 2021-05-04 LAB — SARS-COV-2 RDRP RESP QL NAA+PROBE: NEGATIVE

## 2021-05-04 PROCEDURE — 20600001 HC STEP DOWN PRIVATE ROOM

## 2021-05-04 PROCEDURE — 25000003 PHARM REV CODE 250

## 2021-05-04 PROCEDURE — 94761 N-INVAS EAR/PLS OXIMETRY MLT: CPT

## 2021-05-04 PROCEDURE — 99232 PR SUBSEQUENT HOSPITAL CARE,LEVL II: ICD-10-PCS | Mod: ,,, | Performed by: THORACIC SURGERY (CARDIOTHORACIC VASCULAR SURGERY)

## 2021-05-04 PROCEDURE — 27000646 HC AEROBIKA DEVICE

## 2021-05-04 PROCEDURE — 99900035 HC TECH TIME PER 15 MIN (STAT)

## 2021-05-04 PROCEDURE — U0002 COVID-19 LAB TEST NON-CDC: HCPCS | Performed by: PHYSICIAN ASSISTANT

## 2021-05-04 PROCEDURE — 94799 UNLISTED PULMONARY SVC/PX: CPT

## 2021-05-04 PROCEDURE — 94664 DEMO&/EVAL PT USE INHALER: CPT

## 2021-05-04 PROCEDURE — 99232 SBSQ HOSP IP/OBS MODERATE 35: CPT | Mod: ,,, | Performed by: THORACIC SURGERY (CARDIOTHORACIC VASCULAR SURGERY)

## 2021-05-04 RX ORDER — AMOXICILLIN 250 MG
2 CAPSULE ORAL 2 TIMES DAILY
Status: DISCONTINUED | OUTPATIENT
Start: 2021-05-04 | End: 2021-05-11 | Stop reason: HOSPADM

## 2021-05-04 RX ORDER — HYDROCODONE BITARTRATE AND ACETAMINOPHEN 5; 325 MG/1; MG/1
1 TABLET ORAL EVERY 4 HOURS PRN
Status: DISCONTINUED | OUTPATIENT
Start: 2021-05-04 | End: 2021-05-11 | Stop reason: HOSPADM

## 2021-05-04 RX ORDER — ALBUTEROL SULFATE 2.5 MG/.5ML
2.5 SOLUTION RESPIRATORY (INHALATION) EVERY 4 HOURS PRN
Status: DISCONTINUED | OUTPATIENT
Start: 2021-05-04 | End: 2021-05-11 | Stop reason: HOSPADM

## 2021-05-04 RX ORDER — POLYETHYLENE GLYCOL 3350 17 G/17G
17 POWDER, FOR SOLUTION ORAL 2 TIMES DAILY
Status: DISCONTINUED | OUTPATIENT
Start: 2021-05-04 | End: 2021-05-11 | Stop reason: HOSPADM

## 2021-05-04 RX ORDER — MAG HYDROX/ALUMINUM HYD/SIMETH 200-200-20
30 SUSPENSION, ORAL (FINAL DOSE FORM) ORAL
Status: DISCONTINUED | OUTPATIENT
Start: 2021-05-04 | End: 2021-05-11 | Stop reason: HOSPADM

## 2021-05-04 RX ORDER — SUCRALFATE 1 G/10ML
1 SUSPENSION ORAL EVERY 6 HOURS
Status: DISCONTINUED | OUTPATIENT
Start: 2021-05-04 | End: 2021-05-11 | Stop reason: HOSPADM

## 2021-05-04 RX ADMIN — DOCUSATE SODIUM 50MG AND SENNOSIDES 8.6MG 2 TABLET: 8.6; 5 TABLET, FILM COATED ORAL at 09:05

## 2021-05-04 RX ADMIN — ALUMINUM HYDROXIDE, MAGNESIUM HYDROXIDE, AND SIMETHICONE 30 ML: 200; 200; 20 SUSPENSION ORAL at 05:05

## 2021-05-04 RX ADMIN — SUCRALFATE 1 G: 1 SUSPENSION ORAL at 11:05

## 2021-05-04 RX ADMIN — DOCUSATE SODIUM 50MG AND SENNOSIDES 8.6MG 2 TABLET: 8.6; 5 TABLET, FILM COATED ORAL at 11:05

## 2021-05-04 RX ADMIN — POLYETHYLENE GLYCOL 3350 17 G: 17 POWDER, FOR SOLUTION ORAL at 09:05

## 2021-05-05 ENCOUNTER — ANESTHESIA (OUTPATIENT)
Dept: INTERVENTIONAL RADIOLOGY/VASCULAR | Facility: HOSPITAL | Age: 26
DRG: 200 | End: 2021-05-05
Payer: COMMERCIAL

## 2021-05-05 PROCEDURE — 63600175 PHARM REV CODE 636 W HCPCS: Performed by: NURSE ANESTHETIST, CERTIFIED REGISTERED

## 2021-05-05 PROCEDURE — D9220A PRA ANESTHESIA: ICD-10-PCS | Mod: ,,, | Performed by: ANESTHESIOLOGY

## 2021-05-05 PROCEDURE — D9220A PRA ANESTHESIA: Mod: ,,, | Performed by: NURSE ANESTHETIST, CERTIFIED REGISTERED

## 2021-05-05 PROCEDURE — 99231 PR SUBSEQUENT HOSPITAL CARE,LEVL I: ICD-10-PCS | Mod: ,,, | Performed by: THORACIC SURGERY (CARDIOTHORACIC VASCULAR SURGERY)

## 2021-05-05 PROCEDURE — 25000003 PHARM REV CODE 250: Performed by: NURSE ANESTHETIST, CERTIFIED REGISTERED

## 2021-05-05 PROCEDURE — 99231 SBSQ HOSP IP/OBS SF/LOW 25: CPT | Mod: ,,, | Performed by: THORACIC SURGERY (CARDIOTHORACIC VASCULAR SURGERY)

## 2021-05-05 PROCEDURE — 63600175 PHARM REV CODE 636 W HCPCS

## 2021-05-05 PROCEDURE — 63600175 PHARM REV CODE 636 W HCPCS: Performed by: ANESTHESIOLOGY

## 2021-05-05 PROCEDURE — 20600001 HC STEP DOWN PRIVATE ROOM

## 2021-05-05 PROCEDURE — 94761 N-INVAS EAR/PLS OXIMETRY MLT: CPT

## 2021-05-05 PROCEDURE — D9220A PRA ANESTHESIA: Mod: ,,, | Performed by: ANESTHESIOLOGY

## 2021-05-05 PROCEDURE — 25000003 PHARM REV CODE 250

## 2021-05-05 PROCEDURE — D9220A PRA ANESTHESIA: ICD-10-PCS | Mod: ,,, | Performed by: NURSE ANESTHETIST, CERTIFIED REGISTERED

## 2021-05-05 RX ORDER — PROPOFOL 10 MG/ML
VIAL (ML) INTRAVENOUS
Status: DISCONTINUED | OUTPATIENT
Start: 2021-05-05 | End: 2021-05-05

## 2021-05-05 RX ORDER — LIDOCAINE HYDROCHLORIDE 20 MG/ML
INJECTION INTRAVENOUS
Status: DISCONTINUED | OUTPATIENT
Start: 2021-05-05 | End: 2021-05-05

## 2021-05-05 RX ORDER — KETAMINE HCL IN 0.9 % NACL 50 MG/5 ML
SYRINGE (ML) INTRAVENOUS
Status: DISCONTINUED | OUTPATIENT
Start: 2021-05-05 | End: 2021-05-05

## 2021-05-05 RX ORDER — HYDROMORPHONE HYDROCHLORIDE 1 MG/ML
1 INJECTION, SOLUTION INTRAMUSCULAR; INTRAVENOUS; SUBCUTANEOUS EVERY 4 HOURS PRN
Status: DISCONTINUED | OUTPATIENT
Start: 2021-05-05 | End: 2021-05-08

## 2021-05-05 RX ORDER — HYDROMORPHONE HYDROCHLORIDE 1 MG/ML
0.2 INJECTION, SOLUTION INTRAMUSCULAR; INTRAVENOUS; SUBCUTANEOUS EVERY 5 MIN PRN
Status: DISCONTINUED | OUTPATIENT
Start: 2021-05-05 | End: 2021-05-06

## 2021-05-05 RX ORDER — PROPOFOL 10 MG/ML
VIAL (ML) INTRAVENOUS CONTINUOUS PRN
Status: DISCONTINUED | OUTPATIENT
Start: 2021-05-05 | End: 2021-05-05

## 2021-05-05 RX ORDER — FENTANYL CITRATE 50 UG/ML
INJECTION, SOLUTION INTRAMUSCULAR; INTRAVENOUS
Status: DISCONTINUED | OUTPATIENT
Start: 2021-05-05 | End: 2021-05-05

## 2021-05-05 RX ORDER — HYDROMORPHONE HYDROCHLORIDE 1 MG/ML
INJECTION, SOLUTION INTRAMUSCULAR; INTRAVENOUS; SUBCUTANEOUS
Status: COMPLETED
Start: 2021-05-05 | End: 2021-05-05

## 2021-05-05 RX ORDER — MIDAZOLAM HYDROCHLORIDE 1 MG/ML
INJECTION, SOLUTION INTRAMUSCULAR; INTRAVENOUS
Status: DISCONTINUED | OUTPATIENT
Start: 2021-05-05 | End: 2021-05-05

## 2021-05-05 RX ADMIN — Medication 10 MG: at 05:05

## 2021-05-05 RX ADMIN — HYDROMORPHONE HYDROCHLORIDE 0.2 MG: 1 INJECTION, SOLUTION INTRAMUSCULAR; INTRAVENOUS; SUBCUTANEOUS at 06:05

## 2021-05-05 RX ADMIN — FENTANYL CITRATE 25 MCG: 50 INJECTION, SOLUTION INTRAMUSCULAR; INTRAVENOUS at 06:05

## 2021-05-05 RX ADMIN — LIDOCAINE HYDROCHLORIDE 25 MG: 20 INJECTION, SOLUTION INTRAVENOUS at 05:05

## 2021-05-05 RX ADMIN — PROPOFOL 20 MG: 10 INJECTION, EMULSION INTRAVENOUS at 05:05

## 2021-05-05 RX ADMIN — FENTANYL CITRATE 50 MCG: 50 INJECTION, SOLUTION INTRAMUSCULAR; INTRAVENOUS at 06:05

## 2021-05-05 RX ADMIN — PROPOFOL 200 MCG/KG/MIN: 10 INJECTION, EMULSION INTRAVENOUS at 05:05

## 2021-05-05 RX ADMIN — LIDOCAINE HYDROCHLORIDE 40 MG: 20 INJECTION, SOLUTION INTRAVENOUS at 05:05

## 2021-05-05 RX ADMIN — HYDROCODONE BITARTRATE AND ACETAMINOPHEN 1 TABLET: 5; 325 TABLET ORAL at 06:05

## 2021-05-05 RX ADMIN — SODIUM CHLORIDE: 9 INJECTION, SOLUTION INTRAVENOUS at 04:05

## 2021-05-05 RX ADMIN — MIDAZOLAM HYDROCHLORIDE 2 MG: 1 INJECTION, SOLUTION INTRAMUSCULAR; INTRAVENOUS at 05:05

## 2021-05-05 RX ADMIN — FENTANYL CITRATE 25 MCG: 50 INJECTION, SOLUTION INTRAMUSCULAR; INTRAVENOUS at 05:05

## 2021-05-06 PROCEDURE — 32560 PR CHEMICAL PLEURODESIS FOR PERSISTENT PNEUMOTHORAX: ICD-10-PCS | Mod: ,,, | Performed by: PHYSICIAN ASSISTANT

## 2021-05-06 PROCEDURE — 99231 PR SUBSEQUENT HOSPITAL CARE,LEVL I: ICD-10-PCS | Mod: ,,, | Performed by: PHYSICIAN ASSISTANT

## 2021-05-06 PROCEDURE — 99900035 HC TECH TIME PER 15 MIN (STAT)

## 2021-05-06 PROCEDURE — 25000003 PHARM REV CODE 250

## 2021-05-06 PROCEDURE — 20600001 HC STEP DOWN PRIVATE ROOM

## 2021-05-06 PROCEDURE — 94761 N-INVAS EAR/PLS OXIMETRY MLT: CPT

## 2021-05-06 PROCEDURE — 63600175 PHARM REV CODE 636 W HCPCS: Performed by: PHYSICIAN ASSISTANT

## 2021-05-06 PROCEDURE — 99231 SBSQ HOSP IP/OBS SF/LOW 25: CPT | Mod: ,,, | Performed by: PHYSICIAN ASSISTANT

## 2021-05-06 PROCEDURE — 32560 TREAT PLEURODESIS W/AGENT: CPT | Mod: ,,, | Performed by: PHYSICIAN ASSISTANT

## 2021-05-06 RX ADMIN — HYDROCODONE BITARTRATE AND ACETAMINOPHEN 1 TABLET: 5; 325 TABLET ORAL at 04:05

## 2021-05-06 RX ADMIN — HYDROCODONE BITARTRATE AND ACETAMINOPHEN 1 TABLET: 5; 325 TABLET ORAL at 12:05

## 2021-05-06 RX ADMIN — SUCRALFATE 1 G: 1 SUSPENSION ORAL at 12:05

## 2021-05-06 RX ADMIN — HYDROMORPHONE HYDROCHLORIDE 1 MG: 1 INJECTION, SOLUTION INTRAMUSCULAR; INTRAVENOUS; SUBCUTANEOUS at 04:05

## 2021-05-06 RX ADMIN — ALUMINUM HYDROXIDE, MAGNESIUM HYDROXIDE, AND SIMETHICONE 30 ML: 200; 200; 20 SUSPENSION ORAL at 09:05

## 2021-05-06 RX ADMIN — POLYETHYLENE GLYCOL 3350 17 G: 17 POWDER, FOR SOLUTION ORAL at 08:05

## 2021-05-06 RX ADMIN — HYDROMORPHONE HYDROCHLORIDE 1 MG: 1 INJECTION, SOLUTION INTRAMUSCULAR; INTRAVENOUS; SUBCUTANEOUS at 12:05

## 2021-05-07 PROCEDURE — 94799 UNLISTED PULMONARY SVC/PX: CPT

## 2021-05-07 PROCEDURE — 20600001 HC STEP DOWN PRIVATE ROOM

## 2021-05-07 PROCEDURE — 99900035 HC TECH TIME PER 15 MIN (STAT)

## 2021-05-07 PROCEDURE — 99231 PR SUBSEQUENT HOSPITAL CARE,LEVL I: ICD-10-PCS | Mod: ,,, | Performed by: THORACIC SURGERY (CARDIOTHORACIC VASCULAR SURGERY)

## 2021-05-07 PROCEDURE — 27000646 HC AEROBIKA DEVICE

## 2021-05-07 PROCEDURE — 99231 SBSQ HOSP IP/OBS SF/LOW 25: CPT | Mod: ,,, | Performed by: THORACIC SURGERY (CARDIOTHORACIC VASCULAR SURGERY)

## 2021-05-07 PROCEDURE — 25000003 PHARM REV CODE 250

## 2021-05-07 PROCEDURE — 94761 N-INVAS EAR/PLS OXIMETRY MLT: CPT

## 2021-05-07 PROCEDURE — 94664 DEMO&/EVAL PT USE INHALER: CPT

## 2021-05-07 RX ADMIN — ALUMINUM HYDROXIDE, MAGNESIUM HYDROXIDE, AND SIMETHICONE 30 ML: 200; 200; 20 SUSPENSION ORAL at 04:05

## 2021-05-07 RX ADMIN — ALUMINUM HYDROXIDE, MAGNESIUM HYDROXIDE, AND SIMETHICONE 30 ML: 200; 200; 20 SUSPENSION ORAL at 12:05

## 2021-05-07 RX ADMIN — SUCRALFATE 1 G: 1 SUSPENSION ORAL at 06:05

## 2021-05-07 RX ADMIN — HYDROCODONE BITARTRATE AND ACETAMINOPHEN 1 TABLET: 5; 325 TABLET ORAL at 09:05

## 2021-05-07 RX ADMIN — DOCUSATE SODIUM 50MG AND SENNOSIDES 8.6MG 2 TABLET: 8.6; 5 TABLET, FILM COATED ORAL at 09:05

## 2021-05-08 PROCEDURE — 20600001 HC STEP DOWN PRIVATE ROOM

## 2021-05-08 PROCEDURE — 94761 N-INVAS EAR/PLS OXIMETRY MLT: CPT

## 2021-05-08 PROCEDURE — 94664 DEMO&/EVAL PT USE INHALER: CPT

## 2021-05-08 PROCEDURE — 27000646 HC AEROBIKA DEVICE

## 2021-05-08 PROCEDURE — 99900035 HC TECH TIME PER 15 MIN (STAT)

## 2021-05-09 PROCEDURE — 94664 DEMO&/EVAL PT USE INHALER: CPT

## 2021-05-09 PROCEDURE — 20600001 HC STEP DOWN PRIVATE ROOM

## 2021-05-09 PROCEDURE — 99900035 HC TECH TIME PER 15 MIN (STAT)

## 2021-05-09 PROCEDURE — 94761 N-INVAS EAR/PLS OXIMETRY MLT: CPT

## 2021-05-09 PROCEDURE — 27000646 HC AEROBIKA DEVICE

## 2021-05-10 PROCEDURE — 99231 PR SUBSEQUENT HOSPITAL CARE,LEVL I: ICD-10-PCS | Mod: ,,, | Performed by: THORACIC SURGERY (CARDIOTHORACIC VASCULAR SURGERY)

## 2021-05-10 PROCEDURE — 99231 SBSQ HOSP IP/OBS SF/LOW 25: CPT | Mod: ,,, | Performed by: THORACIC SURGERY (CARDIOTHORACIC VASCULAR SURGERY)

## 2021-05-10 PROCEDURE — 94664 DEMO&/EVAL PT USE INHALER: CPT

## 2021-05-10 PROCEDURE — 94761 N-INVAS EAR/PLS OXIMETRY MLT: CPT

## 2021-05-10 PROCEDURE — 20600001 HC STEP DOWN PRIVATE ROOM

## 2021-05-10 PROCEDURE — 94799 UNLISTED PULMONARY SVC/PX: CPT

## 2021-05-11 VITALS
RESPIRATION RATE: 18 BRPM | HEIGHT: 74 IN | HEART RATE: 73 BPM | WEIGHT: 119.06 LBS | TEMPERATURE: 98 F | DIASTOLIC BLOOD PRESSURE: 68 MMHG | OXYGEN SATURATION: 98 % | SYSTOLIC BLOOD PRESSURE: 105 MMHG | BODY MASS INDEX: 15.28 KG/M2

## 2021-05-11 PROCEDURE — 99900035 HC TECH TIME PER 15 MIN (STAT)

## 2021-05-11 PROCEDURE — 99239 PR HOSPITAL DISCHARGE DAY,>30 MIN: ICD-10-PCS | Mod: ,,, | Performed by: THORACIC SURGERY (CARDIOTHORACIC VASCULAR SURGERY)

## 2021-05-11 PROCEDURE — 27000646 HC AEROBIKA DEVICE

## 2021-05-11 PROCEDURE — 99239 HOSP IP/OBS DSCHRG MGMT >30: CPT | Mod: ,,, | Performed by: THORACIC SURGERY (CARDIOTHORACIC VASCULAR SURGERY)

## 2021-05-11 PROCEDURE — 94761 N-INVAS EAR/PLS OXIMETRY MLT: CPT

## 2021-05-11 PROCEDURE — 94664 DEMO&/EVAL PT USE INHALER: CPT

## 2021-05-11 RX ORDER — HYDROCODONE BITARTRATE AND ACETAMINOPHEN 5; 325 MG/1; MG/1
1 TABLET ORAL EVERY 4 HOURS PRN
Qty: 14 TABLET | Refills: 0 | Status: SHIPPED | OUTPATIENT
Start: 2021-05-11 | End: 2021-05-25

## 2021-05-25 ENCOUNTER — HOSPITAL ENCOUNTER (OUTPATIENT)
Dept: RADIOLOGY | Facility: HOSPITAL | Age: 26
Discharge: HOME | End: 2021-05-25
Attending: INTERNAL MEDICINE
Payer: COMMERCIAL

## 2021-05-25 ENCOUNTER — OFFICE VISIT (OUTPATIENT)
Dept: INTERNAL MEDICINE | Facility: CLINIC | Age: 26
End: 2021-05-25
Payer: COMMERCIAL

## 2021-05-25 ENCOUNTER — TELEPHONE (OUTPATIENT)
Dept: CARDIOTHORACIC SURGERY | Facility: CLINIC | Age: 26
End: 2021-05-25

## 2021-05-25 ENCOUNTER — TELEPHONE (OUTPATIENT)
Dept: PULMONOLOGY | Facility: CLINIC | Age: 26
End: 2021-05-25

## 2021-05-25 VITALS
WEIGHT: 121.5 LBS | BODY MASS INDEX: 15.59 KG/M2 | HEART RATE: 89 BPM | OXYGEN SATURATION: 99 % | SYSTOLIC BLOOD PRESSURE: 100 MMHG | HEIGHT: 74 IN | DIASTOLIC BLOOD PRESSURE: 68 MMHG

## 2021-05-25 DIAGNOSIS — J93.9 PNEUMOTHORAX, LEFT: ICD-10-CM

## 2021-05-25 DIAGNOSIS — J93.9 PNEUMOTHORAX, LEFT: Primary | ICD-10-CM

## 2021-05-25 DIAGNOSIS — J45.20 MILD INTERMITTENT ASTHMA WITHOUT COMPLICATION: ICD-10-CM

## 2021-05-25 DIAGNOSIS — K21.9 GASTROESOPHAGEAL REFLUX DISEASE, UNSPECIFIED WHETHER ESOPHAGITIS PRESENT: ICD-10-CM

## 2021-05-25 DIAGNOSIS — J43.9 LUNG BLEBS: ICD-10-CM

## 2021-05-25 PROCEDURE — 99214 OFFICE O/P EST MOD 30 MIN: CPT | Mod: S$GLB,,, | Performed by: INTERNAL MEDICINE

## 2021-05-25 PROCEDURE — 3008F PR BODY MASS INDEX (BMI) DOCUMENTED: ICD-10-PCS | Mod: CPTII,S$GLB,, | Performed by: INTERNAL MEDICINE

## 2021-05-25 PROCEDURE — 99999 PR PBB SHADOW E&M-EST. PATIENT-LVL V: CPT | Mod: PBBFAC,,, | Performed by: INTERNAL MEDICINE

## 2021-05-25 PROCEDURE — 1126F AMNT PAIN NOTED NONE PRSNT: CPT | Mod: S$GLB,,, | Performed by: INTERNAL MEDICINE

## 2021-05-25 PROCEDURE — 99999 PR PBB SHADOW E&M-EST. PATIENT-LVL V: ICD-10-PCS | Mod: PBBFAC,,, | Performed by: INTERNAL MEDICINE

## 2021-05-25 PROCEDURE — 1126F PR PAIN SEVERITY QUANTIFIED, NO PAIN PRESENT: ICD-10-PCS | Mod: S$GLB,,, | Performed by: INTERNAL MEDICINE

## 2021-05-25 PROCEDURE — 3008F BODY MASS INDEX DOCD: CPT | Mod: CPTII,S$GLB,, | Performed by: INTERNAL MEDICINE

## 2021-05-25 PROCEDURE — 71046 X-RAY EXAM CHEST 2 VIEWS: CPT | Mod: TC

## 2021-05-25 PROCEDURE — 71046 X-RAY EXAM CHEST 2 VIEWS: CPT | Mod: 26,,, | Performed by: RADIOLOGY

## 2021-05-25 PROCEDURE — 71046 XR CHEST PA AND LATERAL: ICD-10-PCS | Mod: 26,,, | Performed by: RADIOLOGY

## 2021-05-25 PROCEDURE — 99214 PR OFFICE/OUTPT VISIT, EST, LEVL IV, 30-39 MIN: ICD-10-PCS | Mod: S$GLB,,, | Performed by: INTERNAL MEDICINE

## 2021-05-25 RX ORDER — ALBUTEROL SULFATE 90 UG/1
1 AEROSOL, METERED RESPIRATORY (INHALATION) EVERY 12 HOURS
Qty: 6.7 G | Refills: 11 | Status: SHIPPED | OUTPATIENT
Start: 2021-05-25 | End: 2022-01-28 | Stop reason: SDUPTHER

## 2021-06-04 ENCOUNTER — HOSPITAL ENCOUNTER (INPATIENT)
Facility: HOSPITAL | Age: 26
LOS: 6 days | Discharge: HOME | DRG: 165 | End: 2021-06-11
Attending: EMERGENCY MEDICINE | Admitting: THORACIC SURGERY (CARDIOTHORACIC VASCULAR SURGERY)
Payer: COMMERCIAL

## 2021-06-04 DIAGNOSIS — R07.9 CHEST PAIN: ICD-10-CM

## 2021-06-04 DIAGNOSIS — J93.9 PNEUMOTHORAX, UNSPECIFIED TYPE: Primary | ICD-10-CM

## 2021-06-04 PROCEDURE — 99285 EMERGENCY DEPT VISIT HI MDM: CPT | Mod: 25,CR,CS, | Performed by: EMERGENCY MEDICINE

## 2021-06-04 PROCEDURE — 32551 INSERTION OF CHEST TUBE: CPT | Mod: ,,, | Performed by: EMERGENCY MEDICINE

## 2021-06-04 PROCEDURE — 93010 EKG 12-LEAD: ICD-10-PCS | Mod: ,,, | Performed by: INTERNAL MEDICINE

## 2021-06-04 PROCEDURE — 25000003 PHARM REV CODE 250

## 2021-06-04 PROCEDURE — 32551 PR TUBE THORACOSTOMY INCLUDES WATER SEAL: ICD-10-PCS | Mod: ,,, | Performed by: EMERGENCY MEDICINE

## 2021-06-04 PROCEDURE — 32551 INSERTION OF CHEST TUBE: CPT

## 2021-06-04 PROCEDURE — 99285 PR EMERGENCY DEPT VISIT,LEVEL V: ICD-10-PCS | Mod: 25,CR,CS, | Performed by: EMERGENCY MEDICINE

## 2021-06-04 PROCEDURE — 93010 ELECTROCARDIOGRAM REPORT: CPT | Mod: ,,, | Performed by: INTERNAL MEDICINE

## 2021-06-04 PROCEDURE — 93005 ELECTROCARDIOGRAM TRACING: CPT

## 2021-06-04 PROCEDURE — 99285 EMERGENCY DEPT VISIT HI MDM: CPT | Mod: 25

## 2021-06-04 RX ORDER — LIDOCAINE HYDROCHLORIDE AND EPINEPHRINE 10; 10 MG/ML; UG/ML
20 INJECTION, SOLUTION INFILTRATION; PERINEURAL ONCE
Status: COMPLETED | OUTPATIENT
Start: 2021-06-05 | End: 2021-06-05

## 2021-06-04 RX ORDER — LIDOCAINE HYDROCHLORIDE 10 MG/ML
INJECTION INFILTRATION; PERINEURAL
Status: COMPLETED
Start: 2021-06-04 | End: 2021-06-04

## 2021-06-04 RX ADMIN — LIDOCAINE HYDROCHLORIDE: 10 INJECTION, SOLUTION INFILTRATION; PERINEURAL at 10:06

## 2021-06-05 PROBLEM — J93.9 PNEUMOTHORAX: Status: ACTIVE | Noted: 2021-06-05

## 2021-06-05 LAB
ALBUMIN SERPL BCP-MCNC: 4 G/DL (ref 3.5–5.2)
ALP SERPL-CCNC: 194 U/L (ref 55–135)
ALT SERPL W/O P-5'-P-CCNC: 9 U/L (ref 10–44)
ANION GAP SERPL CALC-SCNC: 12 MMOL/L (ref 8–16)
AST SERPL-CCNC: 22 U/L (ref 10–40)
BASOPHILS # BLD AUTO: 0.04 K/UL (ref 0–0.2)
BASOPHILS NFR BLD: 0.7 % (ref 0–1.9)
BILIRUB SERPL-MCNC: 0.2 MG/DL (ref 0.1–1)
BUN SERPL-MCNC: 8 MG/DL (ref 6–20)
CALCIUM SERPL-MCNC: 8.1 MG/DL (ref 8.7–10.5)
CHLORIDE SERPL-SCNC: 107 MMOL/L (ref 95–110)
CO2 SERPL-SCNC: 20 MMOL/L (ref 23–29)
CREAT SERPL-MCNC: 0.6 MG/DL (ref 0.5–1.4)
CTP QC/QA: YES
DIFFERENTIAL METHOD: ABNORMAL
EOSINOPHIL # BLD AUTO: 0.1 K/UL (ref 0–0.5)
EOSINOPHIL NFR BLD: 1.4 % (ref 0–8)
ERYTHROCYTE [DISTWIDTH] IN BLOOD BY AUTOMATED COUNT: 14.4 % (ref 11.5–14.5)
EST. GFR  (AFRICAN AMERICAN): >60 ML/MIN/1.73 M^2
EST. GFR  (NON AFRICAN AMERICAN): >60 ML/MIN/1.73 M^2
GLUCOSE SERPL-MCNC: 100 MG/DL (ref 70–110)
HCT VFR BLD AUTO: 45.3 % (ref 40–54)
HGB BLD-MCNC: 14.4 G/DL (ref 14–18)
IMM GRANULOCYTES # BLD AUTO: 0.01 K/UL (ref 0–0.04)
IMM GRANULOCYTES NFR BLD AUTO: 0.2 % (ref 0–0.5)
LYMPHOCYTES # BLD AUTO: 1.6 K/UL (ref 1–4.8)
LYMPHOCYTES NFR BLD: 28.7 % (ref 18–48)
MCH RBC QN AUTO: 28.6 PG (ref 27–31)
MCHC RBC AUTO-ENTMCNC: 31.8 G/DL (ref 32–36)
MCV RBC AUTO: 90 FL (ref 82–98)
MONOCYTES # BLD AUTO: 0.5 K/UL (ref 0.3–1)
MONOCYTES NFR BLD: 9.1 % (ref 4–15)
NEUTROPHILS # BLD AUTO: 3.4 K/UL (ref 1.8–7.7)
NEUTROPHILS NFR BLD: 59.9 % (ref 38–73)
NRBC BLD-RTO: 0 /100 WBC
PLATELET # BLD AUTO: 217 K/UL (ref 150–450)
PMV BLD AUTO: 10.7 FL (ref 9.2–12.9)
POTASSIUM SERPL-SCNC: 5.2 MMOL/L (ref 3.5–5.1)
PROT SERPL-MCNC: 7.4 G/DL (ref 6–8.4)
RBC # BLD AUTO: 5.04 M/UL (ref 4.6–6.2)
SARS-COV-2 RDRP RESP QL NAA+PROBE: NEGATIVE
SODIUM SERPL-SCNC: 139 MMOL/L (ref 136–145)
WBC # BLD AUTO: 5.61 K/UL (ref 3.9–12.7)

## 2021-06-05 PROCEDURE — 25000242 PHARM REV CODE 250 ALT 637 W/ HCPCS: Performed by: STUDENT IN AN ORGANIZED HEALTH CARE EDUCATION/TRAINING PROGRAM

## 2021-06-05 PROCEDURE — 25000003 PHARM REV CODE 250: Performed by: STUDENT IN AN ORGANIZED HEALTH CARE EDUCATION/TRAINING PROGRAM

## 2021-06-05 PROCEDURE — 85025 COMPLETE CBC W/AUTO DIFF WBC: CPT | Performed by: STUDENT IN AN ORGANIZED HEALTH CARE EDUCATION/TRAINING PROGRAM

## 2021-06-05 PROCEDURE — 20600001 HC STEP DOWN PRIVATE ROOM

## 2021-06-05 PROCEDURE — U0002 COVID-19 LAB TEST NON-CDC: HCPCS | Performed by: STUDENT IN AN ORGANIZED HEALTH CARE EDUCATION/TRAINING PROGRAM

## 2021-06-05 PROCEDURE — 80053 COMPREHEN METABOLIC PANEL: CPT | Performed by: STUDENT IN AN ORGANIZED HEALTH CARE EDUCATION/TRAINING PROGRAM

## 2021-06-05 RX ORDER — ONDANSETRON 8 MG/1
8 TABLET, ORALLY DISINTEGRATING ORAL EVERY 8 HOURS PRN
Status: DISCONTINUED | OUTPATIENT
Start: 2021-06-05 | End: 2021-06-11 | Stop reason: HOSPADM

## 2021-06-05 RX ORDER — ALBUTEROL SULFATE 90 UG/1
1 AEROSOL, METERED RESPIRATORY (INHALATION) EVERY 12 HOURS
Status: DISCONTINUED | OUTPATIENT
Start: 2021-06-05 | End: 2021-06-11 | Stop reason: HOSPADM

## 2021-06-05 RX ORDER — TALC
6 POWDER (GRAM) TOPICAL NIGHTLY PRN
Status: DISCONTINUED | OUTPATIENT
Start: 2021-06-05 | End: 2021-06-11 | Stop reason: HOSPADM

## 2021-06-05 RX ORDER — ACETAMINOPHEN 325 MG/1
650 TABLET ORAL EVERY 8 HOURS PRN
Status: DISCONTINUED | OUTPATIENT
Start: 2021-06-05 | End: 2021-06-09

## 2021-06-05 RX ORDER — FAMOTIDINE 20 MG/1
20 TABLET, FILM COATED ORAL DAILY
Status: DISCONTINUED | OUTPATIENT
Start: 2021-06-05 | End: 2021-06-09

## 2021-06-05 RX ORDER — HYDROCODONE BITARTRATE AND ACETAMINOPHEN 5; 325 MG/1; MG/1
1 TABLET ORAL EVERY 4 HOURS PRN
Status: DISCONTINUED | OUTPATIENT
Start: 2021-06-05 | End: 2021-06-08

## 2021-06-05 RX ORDER — SODIUM CHLORIDE 0.9 % (FLUSH) 0.9 %
10 SYRINGE (ML) INJECTION
Status: DISCONTINUED | OUTPATIENT
Start: 2021-06-05 | End: 2021-06-11 | Stop reason: HOSPADM

## 2021-06-05 RX ADMIN — ALBUTEROL SULFATE 1 PUFF: 108 INHALANT RESPIRATORY (INHALATION) at 10:06

## 2021-06-05 RX ADMIN — LIDOCAINE HYDROCHLORIDE AND EPINEPHRINE 20 ML: 10; 10 INJECTION, SOLUTION INFILTRATION; PERINEURAL at 12:06

## 2021-06-05 RX ADMIN — ALBUTEROL SULFATE 1 PUFF: 108 INHALANT RESPIRATORY (INHALATION) at 08:06

## 2021-06-05 RX ADMIN — FAMOTIDINE 20 MG: 20 TABLET ORAL at 08:06

## 2021-06-05 RX ADMIN — HYDROCODONE BITARTRATE AND ACETAMINOPHEN 1 TABLET: 5; 325 TABLET ORAL at 10:06

## 2021-06-05 RX ADMIN — HYDROCODONE BITARTRATE AND ACETAMINOPHEN 1 TABLET: 5; 325 TABLET ORAL at 06:06

## 2021-06-06 PROCEDURE — 25000003 PHARM REV CODE 250: Performed by: STUDENT IN AN ORGANIZED HEALTH CARE EDUCATION/TRAINING PROGRAM

## 2021-06-06 PROCEDURE — 99900035 HC TECH TIME PER 15 MIN (STAT)

## 2021-06-06 PROCEDURE — 25000242 PHARM REV CODE 250 ALT 637 W/ HCPCS: Performed by: STUDENT IN AN ORGANIZED HEALTH CARE EDUCATION/TRAINING PROGRAM

## 2021-06-06 PROCEDURE — 20600001 HC STEP DOWN PRIVATE ROOM

## 2021-06-06 PROCEDURE — 94761 N-INVAS EAR/PLS OXIMETRY MLT: CPT

## 2021-06-06 PROCEDURE — 94640 AIRWAY INHALATION TREATMENT: CPT

## 2021-06-06 PROCEDURE — 63600175 PHARM REV CODE 636 W HCPCS: Performed by: SURGERY

## 2021-06-06 RX ORDER — ENOXAPARIN SODIUM 100 MG/ML
40 INJECTION SUBCUTANEOUS DAILY
Status: DISCONTINUED | OUTPATIENT
Start: 2021-06-06 | End: 2021-06-11 | Stop reason: HOSPADM

## 2021-06-06 RX ADMIN — ALBUTEROL SULFATE 1 PUFF: 108 INHALANT RESPIRATORY (INHALATION) at 09:06

## 2021-06-06 RX ADMIN — ALBUTEROL SULFATE 1 PUFF: 108 INHALANT RESPIRATORY (INHALATION) at 08:06

## 2021-06-06 RX ADMIN — FAMOTIDINE 20 MG: 20 TABLET ORAL at 08:06

## 2021-06-06 RX ADMIN — ENOXAPARIN SODIUM 40 MG: 40 INJECTION SUBCUTANEOUS at 08:06

## 2021-06-06 RX ADMIN — HYDROCODONE BITARTRATE AND ACETAMINOPHEN 1 TABLET: 5; 325 TABLET ORAL at 03:06

## 2021-06-07 ENCOUNTER — ANESTHESIA EVENT (OUTPATIENT)
Dept: SURGERY | Facility: HOSPITAL | Age: 26
DRG: 165 | End: 2021-06-07
Payer: COMMERCIAL

## 2021-06-07 LAB — SARS-COV-2 RDRP RESP QL NAA+PROBE: NEGATIVE

## 2021-06-07 PROCEDURE — 63600175 PHARM REV CODE 636 W HCPCS: Performed by: SURGERY

## 2021-06-07 PROCEDURE — 25000003 PHARM REV CODE 250

## 2021-06-07 PROCEDURE — 94760 N-INVAS EAR/PLS OXIMETRY 1: CPT

## 2021-06-07 PROCEDURE — 94761 N-INVAS EAR/PLS OXIMETRY MLT: CPT

## 2021-06-07 PROCEDURE — 20600001 HC STEP DOWN PRIVATE ROOM

## 2021-06-07 PROCEDURE — 94640 AIRWAY INHALATION TREATMENT: CPT

## 2021-06-07 PROCEDURE — 25000003 PHARM REV CODE 250: Performed by: STUDENT IN AN ORGANIZED HEALTH CARE EDUCATION/TRAINING PROGRAM

## 2021-06-07 PROCEDURE — U0002 COVID-19 LAB TEST NON-CDC: HCPCS | Performed by: SURGERY

## 2021-06-07 RX ORDER — CHLORHEXIDINE GLUCONATE ORAL RINSE 1.2 MG/ML
15 SOLUTION DENTAL 2 TIMES DAILY
Status: DISCONTINUED | OUTPATIENT
Start: 2021-06-07 | End: 2021-06-11 | Stop reason: HOSPADM

## 2021-06-07 RX ORDER — LIDOCAINE 50 MG/G
2 PATCH TOPICAL
Status: DISCONTINUED | OUTPATIENT
Start: 2021-06-07 | End: 2021-06-11 | Stop reason: HOSPADM

## 2021-06-07 RX ADMIN — LIDOCAINE 2 PATCH: 50 PATCH TOPICAL at 09:06

## 2021-06-07 RX ADMIN — FAMOTIDINE 20 MG: 20 TABLET ORAL at 09:06

## 2021-06-07 RX ADMIN — ALBUTEROL SULFATE 1 PUFF: 108 INHALANT RESPIRATORY (INHALATION) at 09:06

## 2021-06-07 RX ADMIN — ENOXAPARIN SODIUM 40 MG: 40 INJECTION SUBCUTANEOUS at 09:06

## 2021-06-07 RX ADMIN — CHLORHEXIDINE GLUCONATE 0.12% ORAL RINSE 15 ML: 1.2 LIQUID ORAL at 09:06

## 2021-06-07 RX ADMIN — ALBUTEROL SULFATE 1 PUFF: 108 INHALANT RESPIRATORY (INHALATION) at 07:06

## 2021-06-08 ENCOUNTER — ANESTHESIA (OUTPATIENT)
Dept: SURGERY | Facility: HOSPITAL | Age: 26
DRG: 165 | End: 2021-06-08
Payer: COMMERCIAL

## 2021-06-08 PROCEDURE — 25000003 PHARM REV CODE 250

## 2021-06-08 PROCEDURE — 25000003 PHARM REV CODE 250: Performed by: STUDENT IN AN ORGANIZED HEALTH CARE EDUCATION/TRAINING PROGRAM

## 2021-06-08 PROCEDURE — 88307 TISSUE EXAM BY PATHOLOGIST: CPT | Mod: 26,,, | Performed by: PATHOLOGY

## 2021-06-08 PROCEDURE — 36000711: Performed by: THORACIC SURGERY (CARDIOTHORACIC VASCULAR SURGERY)

## 2021-06-08 PROCEDURE — 63600175 PHARM REV CODE 636 W HCPCS: Performed by: ANESTHESIOLOGY

## 2021-06-08 PROCEDURE — 20600001 HC STEP DOWN PRIVATE ROOM

## 2021-06-08 PROCEDURE — 63600175 PHARM REV CODE 636 W HCPCS: Performed by: STUDENT IN AN ORGANIZED HEALTH CARE EDUCATION/TRAINING PROGRAM

## 2021-06-08 PROCEDURE — 94761 N-INVAS EAR/PLS OXIMETRY MLT: CPT

## 2021-06-08 PROCEDURE — C1729 CATH, DRAINAGE: HCPCS | Performed by: THORACIC SURGERY (CARDIOTHORACIC VASCULAR SURGERY)

## 2021-06-08 PROCEDURE — 71000015 HC POSTOP RECOV 1ST HR: Performed by: THORACIC SURGERY (CARDIOTHORACIC VASCULAR SURGERY)

## 2021-06-08 PROCEDURE — D9220A PRA ANESTHESIA: Mod: ,,, | Performed by: STUDENT IN AN ORGANIZED HEALTH CARE EDUCATION/TRAINING PROGRAM

## 2021-06-08 PROCEDURE — 32655 PR THORACOSCOPY SURG EXCIS BULAE: ICD-10-PCS | Mod: LT,,, | Performed by: THORACIC SURGERY (CARDIOTHORACIC VASCULAR SURGERY)

## 2021-06-08 PROCEDURE — 37000009 HC ANESTHESIA EA ADD 15 MINS: Performed by: THORACIC SURGERY (CARDIOTHORACIC VASCULAR SURGERY)

## 2021-06-08 PROCEDURE — 88307 PR  SURG PATH,LEVEL V: ICD-10-PCS | Mod: 26,,, | Performed by: PATHOLOGY

## 2021-06-08 PROCEDURE — 36000710: Performed by: THORACIC SURGERY (CARDIOTHORACIC VASCULAR SURGERY)

## 2021-06-08 PROCEDURE — 71000033 HC RECOVERY, INTIAL HOUR: Performed by: THORACIC SURGERY (CARDIOTHORACIC VASCULAR SURGERY)

## 2021-06-08 PROCEDURE — 37000008 HC ANESTHESIA 1ST 15 MINUTES: Performed by: THORACIC SURGERY (CARDIOTHORACIC VASCULAR SURGERY)

## 2021-06-08 PROCEDURE — 25000003 PHARM REV CODE 250: Performed by: SURGERY

## 2021-06-08 PROCEDURE — 27201423 OPTIME MED/SURG SUP & DEVICES STERILE SUPPLY: Performed by: THORACIC SURGERY (CARDIOTHORACIC VASCULAR SURGERY)

## 2021-06-08 PROCEDURE — 63600175 PHARM REV CODE 636 W HCPCS: Performed by: NURSE ANESTHETIST, CERTIFIED REGISTERED

## 2021-06-08 PROCEDURE — 63600175 PHARM REV CODE 636 W HCPCS: Performed by: SURGERY

## 2021-06-08 PROCEDURE — 25000003 PHARM REV CODE 250: Performed by: THORACIC SURGERY (CARDIOTHORACIC VASCULAR SURGERY)

## 2021-06-08 PROCEDURE — 94640 AIRWAY INHALATION TREATMENT: CPT

## 2021-06-08 PROCEDURE — 32655 THORACOSCOPY RESECT BULLAE: CPT | Mod: LT,,, | Performed by: THORACIC SURGERY (CARDIOTHORACIC VASCULAR SURGERY)

## 2021-06-08 PROCEDURE — D9220A PRA ANESTHESIA: ICD-10-PCS | Mod: ,,, | Performed by: STUDENT IN AN ORGANIZED HEALTH CARE EDUCATION/TRAINING PROGRAM

## 2021-06-08 PROCEDURE — 88307 TISSUE EXAM BY PATHOLOGIST: CPT | Performed by: PATHOLOGY

## 2021-06-08 PROCEDURE — 99900035 HC TECH TIME PER 15 MIN (STAT)

## 2021-06-08 PROCEDURE — 25000003 PHARM REV CODE 250: Performed by: NURSE ANESTHETIST, CERTIFIED REGISTERED

## 2021-06-08 RX ORDER — ACETAMINOPHEN 10 MG/ML
INJECTION, SOLUTION INTRAVENOUS
Status: DISCONTINUED | OUTPATIENT
Start: 2021-06-08 | End: 2021-06-08

## 2021-06-08 RX ORDER — FENTANYL CITRATE 50 UG/ML
INJECTION, SOLUTION INTRAMUSCULAR; INTRAVENOUS
Status: DISPENSED
Start: 2021-06-08 | End: 2021-06-09

## 2021-06-08 RX ORDER — PROPOFOL 10 MG/ML
VIAL (ML) INTRAVENOUS
Status: DISCONTINUED | OUTPATIENT
Start: 2021-06-08 | End: 2021-06-08

## 2021-06-08 RX ORDER — FENTANYL CITRATE 50 UG/ML
INJECTION, SOLUTION INTRAMUSCULAR; INTRAVENOUS
Status: DISCONTINUED | OUTPATIENT
Start: 2021-06-08 | End: 2021-06-08

## 2021-06-08 RX ORDER — LIDOCAINE HCL/PF 100 MG/5ML
SYRINGE (ML) INTRAVENOUS
Status: DISCONTINUED | OUTPATIENT
Start: 2021-06-08 | End: 2021-06-08

## 2021-06-08 RX ORDER — VASOPRESSIN 20 [USP'U]/ML
INJECTION, SOLUTION INTRAMUSCULAR; SUBCUTANEOUS
Status: DISCONTINUED | OUTPATIENT
Start: 2021-06-08 | End: 2021-06-08

## 2021-06-08 RX ORDER — LIDOCAINE 50 MG/G
1 PATCH TOPICAL ONCE
Status: COMPLETED | OUTPATIENT
Start: 2021-06-08 | End: 2021-06-09

## 2021-06-08 RX ORDER — PHENYLEPHRINE HYDROCHLORIDE 10 MG/ML
INJECTION INTRAVENOUS
Status: DISCONTINUED | OUTPATIENT
Start: 2021-06-08 | End: 2021-06-08

## 2021-06-08 RX ORDER — OXYCODONE AND ACETAMINOPHEN 10; 325 MG/1; MG/1
1 TABLET ORAL EVERY 4 HOURS PRN
Status: DISCONTINUED | OUTPATIENT
Start: 2021-06-08 | End: 2021-06-09

## 2021-06-08 RX ORDER — MIDAZOLAM HYDROCHLORIDE 1 MG/ML
INJECTION INTRAMUSCULAR; INTRAVENOUS
Status: DISCONTINUED | OUTPATIENT
Start: 2021-06-08 | End: 2021-06-08

## 2021-06-08 RX ORDER — DEXAMETHASONE SODIUM PHOSPHATE 4 MG/ML
INJECTION, SOLUTION INTRA-ARTICULAR; INTRALESIONAL; INTRAMUSCULAR; INTRAVENOUS; SOFT TISSUE
Status: DISCONTINUED | OUTPATIENT
Start: 2021-06-08 | End: 2021-06-08

## 2021-06-08 RX ORDER — CEFAZOLIN SODIUM 1 G/3ML
INJECTION, POWDER, FOR SOLUTION INTRAMUSCULAR; INTRAVENOUS
Status: DISCONTINUED | OUTPATIENT
Start: 2021-06-08 | End: 2021-06-08

## 2021-06-08 RX ORDER — LIDOCAINE HYDROCHLORIDE AND EPINEPHRINE 10; 10 MG/ML; UG/ML
INJECTION, SOLUTION INFILTRATION; PERINEURAL
Status: DISCONTINUED | OUTPATIENT
Start: 2021-06-08 | End: 2021-06-08 | Stop reason: HOSPADM

## 2021-06-08 RX ORDER — HYDROMORPHONE HCL IN 0.9% NACL 6 MG/30 ML
PATIENT CONTROLLED ANALGESIA SYRINGE INTRAVENOUS CONTINUOUS
Status: DISCONTINUED | OUTPATIENT
Start: 2021-06-08 | End: 2021-06-09

## 2021-06-08 RX ORDER — NALOXONE HCL 0.4 MG/ML
0.02 VIAL (ML) INJECTION
Status: DISCONTINUED | OUTPATIENT
Start: 2021-06-08 | End: 2021-06-09

## 2021-06-08 RX ORDER — ONDANSETRON 2 MG/ML
INJECTION INTRAMUSCULAR; INTRAVENOUS
Status: DISCONTINUED | OUTPATIENT
Start: 2021-06-08 | End: 2021-06-08

## 2021-06-08 RX ORDER — ROCURONIUM BROMIDE 10 MG/ML
INJECTION, SOLUTION INTRAVENOUS
Status: DISCONTINUED | OUTPATIENT
Start: 2021-06-08 | End: 2021-06-08

## 2021-06-08 RX ORDER — HYDROMORPHONE HYDROCHLORIDE 1 MG/ML
0.5 INJECTION, SOLUTION INTRAMUSCULAR; INTRAVENOUS; SUBCUTANEOUS ONCE
Status: COMPLETED | OUTPATIENT
Start: 2021-06-08 | End: 2021-06-08

## 2021-06-08 RX ORDER — DOXYCYCLINE 100 MG/10ML
INJECTION, POWDER, LYOPHILIZED, FOR SOLUTION INTRAVENOUS
Status: DISCONTINUED | OUTPATIENT
Start: 2021-06-08 | End: 2021-06-08 | Stop reason: HOSPADM

## 2021-06-08 RX ORDER — ONDANSETRON 2 MG/ML
4 INJECTION INTRAMUSCULAR; INTRAVENOUS DAILY PRN
Status: DISCONTINUED | OUTPATIENT
Start: 2021-06-08 | End: 2021-06-08 | Stop reason: HOSPADM

## 2021-06-08 RX ORDER — FENTANYL CITRATE 50 UG/ML
25 INJECTION, SOLUTION INTRAMUSCULAR; INTRAVENOUS EVERY 5 MIN PRN
Status: DISCONTINUED | OUTPATIENT
Start: 2021-06-08 | End: 2021-06-08 | Stop reason: HOSPADM

## 2021-06-08 RX ORDER — OXYCODONE AND ACETAMINOPHEN 5; 325 MG/1; MG/1
1 TABLET ORAL EVERY 4 HOURS PRN
Status: DISCONTINUED | OUTPATIENT
Start: 2021-06-08 | End: 2021-06-09

## 2021-06-08 RX ADMIN — PHENYLEPHRINE HYDROCHLORIDE 100 MCG: 10 INJECTION INTRAVENOUS at 02:06

## 2021-06-08 RX ADMIN — PROPOFOL 50 MG: 10 INJECTION, EMULSION INTRAVENOUS at 02:06

## 2021-06-08 RX ADMIN — OXYCODONE AND ACETAMINOPHEN 1 TABLET: 10; 325 TABLET ORAL at 07:06

## 2021-06-08 RX ADMIN — SUGAMMADEX 200 MG: 100 INJECTION, SOLUTION INTRAVENOUS at 04:06

## 2021-06-08 RX ADMIN — HYDROCODONE BITARTRATE AND ACETAMINOPHEN 1 TABLET: 5; 325 TABLET ORAL at 05:06

## 2021-06-08 RX ADMIN — DEXAMETHASONE SODIUM PHOSPHATE 8 MG: 4 INJECTION, SOLUTION INTRAMUSCULAR; INTRAVENOUS at 02:06

## 2021-06-08 RX ADMIN — PROPOFOL 100 MG: 10 INJECTION, EMULSION INTRAVENOUS at 02:06

## 2021-06-08 RX ADMIN — ACETAMINOPHEN 1000 MG: 10 INJECTION, SOLUTION INTRAVENOUS at 02:06

## 2021-06-08 RX ADMIN — ALBUTEROL SULFATE 1 PUFF: 108 INHALANT RESPIRATORY (INHALATION) at 08:06

## 2021-06-08 RX ADMIN — LIDOCAINE 1 PATCH: 50 PATCH TOPICAL at 06:06

## 2021-06-08 RX ADMIN — HYDROMORPHONE HYDROCHLORIDE 0.5 MG: 1 INJECTION, SOLUTION INTRAMUSCULAR; INTRAVENOUS; SUBCUTANEOUS at 06:06

## 2021-06-08 RX ADMIN — Medication: at 09:06

## 2021-06-08 RX ADMIN — SODIUM CHLORIDE: 0.9 INJECTION, SOLUTION INTRAVENOUS at 02:06

## 2021-06-08 RX ADMIN — CEFAZOLIN 2 G: 330 INJECTION, POWDER, FOR SOLUTION INTRAMUSCULAR; INTRAVENOUS at 03:06

## 2021-06-08 RX ADMIN — FAMOTIDINE 20 MG: 20 TABLET ORAL at 10:06

## 2021-06-08 RX ADMIN — LIDOCAINE HYDROCHLORIDE 100 MG: 20 INJECTION, SOLUTION INTRAVENOUS at 02:06

## 2021-06-08 RX ADMIN — FENTANYL CITRATE 100 MCG: 50 INJECTION, SOLUTION INTRAMUSCULAR; INTRAVENOUS at 02:06

## 2021-06-08 RX ADMIN — CHLORHEXIDINE GLUCONATE 0.12% ORAL RINSE 15 ML: 1.2 LIQUID ORAL at 08:06

## 2021-06-08 RX ADMIN — VASOPRESSIN 2 UNITS: 20 INJECTION, SOLUTION INTRAMUSCULAR; SUBCUTANEOUS at 03:06

## 2021-06-08 RX ADMIN — LIDOCAINE 2 PATCH: 50 PATCH TOPICAL at 10:06

## 2021-06-08 RX ADMIN — CHLORHEXIDINE GLUCONATE 0.12% ORAL RINSE 15 ML: 1.2 LIQUID ORAL at 10:06

## 2021-06-08 RX ADMIN — PHENYLEPHRINE HYDROCHLORIDE 200 MCG: 10 INJECTION INTRAVENOUS at 03:06

## 2021-06-08 RX ADMIN — MIDAZOLAM HYDROCHLORIDE 2 MG: 1 INJECTION, SOLUTION INTRAMUSCULAR; INTRAVENOUS at 02:06

## 2021-06-08 RX ADMIN — ROCURONIUM BROMIDE 50 MG: 10 INJECTION, SOLUTION INTRAVENOUS at 02:06

## 2021-06-08 RX ADMIN — ONDANSETRON 4 MG: 2 INJECTION INTRAMUSCULAR; INTRAVENOUS at 03:06

## 2021-06-08 RX ADMIN — FENTANYL CITRATE 25 MCG: 50 INJECTION, SOLUTION INTRAMUSCULAR; INTRAVENOUS at 04:06

## 2021-06-09 PROCEDURE — 63600175 PHARM REV CODE 636 W HCPCS: Performed by: SURGERY

## 2021-06-09 PROCEDURE — 94640 AIRWAY INHALATION TREATMENT: CPT

## 2021-06-09 PROCEDURE — 25000003 PHARM REV CODE 250: Performed by: STUDENT IN AN ORGANIZED HEALTH CARE EDUCATION/TRAINING PROGRAM

## 2021-06-09 PROCEDURE — 25000003 PHARM REV CODE 250

## 2021-06-09 PROCEDURE — 94761 N-INVAS EAR/PLS OXIMETRY MLT: CPT

## 2021-06-09 PROCEDURE — 20600001 HC STEP DOWN PRIVATE ROOM

## 2021-06-09 PROCEDURE — 99900035 HC TECH TIME PER 15 MIN (STAT)

## 2021-06-09 RX ORDER — ALUMINUM HYDROXIDE, MAGNESIUM HYDROXIDE, AND SIMETHICONE 2400; 240; 2400 MG/30ML; MG/30ML; MG/30ML
30 SUSPENSION ORAL ONCE AS NEEDED
Status: DISCONTINUED | OUTPATIENT
Start: 2021-06-09 | End: 2021-06-11 | Stop reason: HOSPADM

## 2021-06-09 RX ORDER — ACETAMINOPHEN 500 MG
1000 TABLET ORAL EVERY 8 HOURS
Status: DISCONTINUED | OUTPATIENT
Start: 2021-06-09 | End: 2021-06-11 | Stop reason: HOSPADM

## 2021-06-09 RX ORDER — OXYCODONE HYDROCHLORIDE 10 MG/1
10 TABLET ORAL EVERY 4 HOURS PRN
Status: DISCONTINUED | OUTPATIENT
Start: 2021-06-09 | End: 2021-06-11 | Stop reason: HOSPADM

## 2021-06-09 RX ORDER — CELECOXIB 100 MG/1
100 CAPSULE ORAL 2 TIMES DAILY
Status: DISCONTINUED | OUTPATIENT
Start: 2021-06-09 | End: 2021-06-11 | Stop reason: HOSPADM

## 2021-06-09 RX ORDER — LIDOCAINE HYDROCHLORIDE 20 MG/ML
10 SOLUTION OROPHARYNGEAL ONCE AS NEEDED
Status: DISCONTINUED | OUTPATIENT
Start: 2021-06-09 | End: 2021-06-11 | Stop reason: HOSPADM

## 2021-06-09 RX ORDER — METHOCARBAMOL 500 MG/1
500 TABLET, FILM COATED ORAL 4 TIMES DAILY
Status: DISCONTINUED | OUTPATIENT
Start: 2021-06-09 | End: 2021-06-10

## 2021-06-09 RX ORDER — CALCIUM CARBONATE 200(500)MG
1000 TABLET,CHEWABLE ORAL 2 TIMES DAILY PRN
Status: DISCONTINUED | OUTPATIENT
Start: 2021-06-09 | End: 2021-06-11 | Stop reason: HOSPADM

## 2021-06-09 RX ORDER — PANTOPRAZOLE SODIUM 40 MG/1
40 TABLET, DELAYED RELEASE ORAL DAILY
Status: DISCONTINUED | OUTPATIENT
Start: 2021-06-09 | End: 2021-06-11 | Stop reason: HOSPADM

## 2021-06-09 RX ORDER — OXYCODONE HYDROCHLORIDE 5 MG/1
5 TABLET ORAL EVERY 4 HOURS PRN
Status: DISCONTINUED | OUTPATIENT
Start: 2021-06-09 | End: 2021-06-11 | Stop reason: HOSPADM

## 2021-06-09 RX ORDER — GABAPENTIN 300 MG/1
300 CAPSULE ORAL 3 TIMES DAILY
Status: DISCONTINUED | OUTPATIENT
Start: 2021-06-09 | End: 2021-06-10

## 2021-06-09 RX ORDER — PANTOPRAZOLE SODIUM 40 MG/1
40 TABLET, DELAYED RELEASE ORAL DAILY
Status: DISCONTINUED | OUTPATIENT
Start: 2021-06-10 | End: 2021-06-09

## 2021-06-09 RX ADMIN — METHOCARBAMOL 500 MG: 500 TABLET ORAL at 03:06

## 2021-06-09 RX ADMIN — OXYCODONE 5 MG: 5 TABLET ORAL at 03:06

## 2021-06-09 RX ADMIN — CELECOXIB 100 MG: 100 CAPSULE ORAL at 10:06

## 2021-06-09 RX ADMIN — FAMOTIDINE 20 MG: 20 TABLET ORAL at 07:06

## 2021-06-09 RX ADMIN — LIDOCAINE 2 PATCH: 50 PATCH TOPICAL at 07:06

## 2021-06-09 RX ADMIN — ENOXAPARIN SODIUM 40 MG: 40 INJECTION SUBCUTANEOUS at 07:06

## 2021-06-09 RX ADMIN — ALBUTEROL SULFATE 1 PUFF: 108 INHALANT RESPIRATORY (INHALATION) at 09:06

## 2021-06-09 RX ADMIN — CHLORHEXIDINE GLUCONATE 0.12% ORAL RINSE 15 ML: 1.2 LIQUID ORAL at 10:06

## 2021-06-09 RX ADMIN — PANTOPRAZOLE SODIUM 40 MG: 40 TABLET, DELAYED RELEASE ORAL at 04:06

## 2021-06-09 RX ADMIN — ACETAMINOPHEN 1000 MG: 500 TABLET ORAL at 03:06

## 2021-06-09 RX ADMIN — CELECOXIB 100 MG: 100 CAPSULE ORAL at 03:06

## 2021-06-09 RX ADMIN — ACETAMINOPHEN 1000 MG: 500 TABLET ORAL at 10:06

## 2021-06-09 RX ADMIN — CHLORHEXIDINE GLUCONATE 0.12% ORAL RINSE 15 ML: 1.2 LIQUID ORAL at 11:06

## 2021-06-09 RX ADMIN — ALBUTEROL SULFATE 1 PUFF: 108 INHALANT RESPIRATORY (INHALATION) at 10:06

## 2021-06-09 RX ADMIN — METHOCARBAMOL 500 MG: 500 TABLET ORAL at 10:06

## 2021-06-09 RX ADMIN — GABAPENTIN 300 MG: 300 CAPSULE ORAL at 10:06

## 2021-06-09 RX ADMIN — GABAPENTIN 300 MG: 300 CAPSULE ORAL at 03:06

## 2021-06-10 PROCEDURE — 20600001 HC STEP DOWN PRIVATE ROOM

## 2021-06-10 PROCEDURE — 94640 AIRWAY INHALATION TREATMENT: CPT

## 2021-06-10 PROCEDURE — 94761 N-INVAS EAR/PLS OXIMETRY MLT: CPT

## 2021-06-10 PROCEDURE — 27100098 HC SPACER

## 2021-06-10 PROCEDURE — 63600175 PHARM REV CODE 636 W HCPCS: Performed by: SURGERY

## 2021-06-10 PROCEDURE — 25000003 PHARM REV CODE 250

## 2021-06-10 RX ADMIN — ALBUTEROL SULFATE 1 PUFF: 108 INHALANT RESPIRATORY (INHALATION) at 10:06

## 2021-06-10 RX ADMIN — CELECOXIB 100 MG: 100 CAPSULE ORAL at 10:06

## 2021-06-10 RX ADMIN — CELECOXIB 100 MG: 100 CAPSULE ORAL at 09:06

## 2021-06-10 RX ADMIN — ENOXAPARIN SODIUM 40 MG: 40 INJECTION SUBCUTANEOUS at 05:06

## 2021-06-10 RX ADMIN — CHLORHEXIDINE GLUCONATE 0.12% ORAL RINSE 15 ML: 1.2 LIQUID ORAL at 09:06

## 2021-06-10 RX ADMIN — CHLORHEXIDINE GLUCONATE 0.12% ORAL RINSE 15 ML: 1.2 LIQUID ORAL at 10:06

## 2021-06-10 RX ADMIN — ACETAMINOPHEN 1000 MG: 500 TABLET ORAL at 06:06

## 2021-06-10 RX ADMIN — ACETAMINOPHEN 1000 MG: 500 TABLET ORAL at 10:06

## 2021-06-10 RX ADMIN — ALBUTEROL SULFATE 1 PUFF: 108 INHALANT RESPIRATORY (INHALATION) at 08:06

## 2021-06-10 RX ADMIN — PANTOPRAZOLE SODIUM 40 MG: 40 TABLET, DELAYED RELEASE ORAL at 09:06

## 2021-06-10 RX ADMIN — LIDOCAINE 2 PATCH: 50 PATCH TOPICAL at 09:06

## 2021-06-10 RX ADMIN — OXYCODONE 5 MG: 5 TABLET ORAL at 09:06

## 2021-06-10 RX ADMIN — ACETAMINOPHEN 1000 MG: 500 TABLET ORAL at 03:06

## 2021-06-11 VITALS
SYSTOLIC BLOOD PRESSURE: 95 MMHG | RESPIRATION RATE: 17 BRPM | TEMPERATURE: 98 F | BODY MASS INDEX: 14.95 KG/M2 | OXYGEN SATURATION: 97 % | WEIGHT: 116.5 LBS | HEART RATE: 91 BPM | HEIGHT: 74 IN | DIASTOLIC BLOOD PRESSURE: 64 MMHG

## 2021-06-11 DIAGNOSIS — Z87.09 HX OF PNEUMOTHORAX: Primary | ICD-10-CM

## 2021-06-11 PROCEDURE — 94640 AIRWAY INHALATION TREATMENT: CPT

## 2021-06-11 PROCEDURE — 27100098 HC SPACER

## 2021-06-11 PROCEDURE — 25000003 PHARM REV CODE 250

## 2021-06-11 PROCEDURE — 94761 N-INVAS EAR/PLS OXIMETRY MLT: CPT

## 2021-06-11 RX ORDER — OXYCODONE HYDROCHLORIDE 5 MG/1
5 TABLET ORAL EVERY 4 HOURS PRN
Qty: 20 TABLET | Refills: 0 | Status: SHIPPED | OUTPATIENT
Start: 2021-06-11 | End: 2021-07-26

## 2021-06-11 RX ADMIN — LIDOCAINE 2 PATCH: 50 PATCH TOPICAL at 08:06

## 2021-06-11 RX ADMIN — CHLORHEXIDINE GLUCONATE 0.12% ORAL RINSE 15 ML: 1.2 LIQUID ORAL at 08:06

## 2021-06-11 RX ADMIN — PANTOPRAZOLE SODIUM 40 MG: 40 TABLET, DELAYED RELEASE ORAL at 08:06

## 2021-06-11 RX ADMIN — ALBUTEROL SULFATE 1 PUFF: 108 INHALANT RESPIRATORY (INHALATION) at 08:06

## 2021-06-11 RX ADMIN — CELECOXIB 100 MG: 100 CAPSULE ORAL at 08:06

## 2021-06-22 LAB
FINAL PATHOLOGIC DIAGNOSIS: NORMAL
GROSS: NORMAL
Lab: NORMAL

## 2021-06-25 ENCOUNTER — HOSPITAL ENCOUNTER (OUTPATIENT)
Dept: RADIOLOGY | Facility: HOSPITAL | Age: 26
Discharge: HOME | DRG: 165 | End: 2021-06-25
Attending: PHYSICIAN ASSISTANT
Payer: COMMERCIAL

## 2021-06-25 ENCOUNTER — HOSPITAL ENCOUNTER (INPATIENT)
Facility: HOSPITAL | Age: 26
LOS: 4 days | Discharge: HOME | DRG: 165 | End: 2021-06-29
Attending: EMERGENCY MEDICINE | Admitting: THORACIC SURGERY (CARDIOTHORACIC VASCULAR SURGERY)
Payer: COMMERCIAL

## 2021-06-25 ENCOUNTER — ANESTHESIA EVENT (OUTPATIENT)
Dept: SURGERY | Facility: HOSPITAL | Age: 26
DRG: 165 | End: 2021-06-25
Payer: COMMERCIAL

## 2021-06-25 ENCOUNTER — OFFICE VISIT (OUTPATIENT)
Dept: CARDIOTHORACIC SURGERY | Facility: CLINIC | Age: 26
End: 2021-06-25
Payer: COMMERCIAL

## 2021-06-25 VITALS
DIASTOLIC BLOOD PRESSURE: 82 MMHG | BODY MASS INDEX: 15.5 KG/M2 | HEART RATE: 93 BPM | HEIGHT: 74 IN | WEIGHT: 120.81 LBS | SYSTOLIC BLOOD PRESSURE: 119 MMHG | OXYGEN SATURATION: 99 %

## 2021-06-25 DIAGNOSIS — J93.9 RECURRENT PNEUMOTHORAX: ICD-10-CM

## 2021-06-25 DIAGNOSIS — J93.9 PNEUMOTHORAX ON LEFT: ICD-10-CM

## 2021-06-25 DIAGNOSIS — Z87.09 HX OF PNEUMOTHORAX: ICD-10-CM

## 2021-06-25 DIAGNOSIS — R06.02 SOB (SHORTNESS OF BREATH): ICD-10-CM

## 2021-06-25 DIAGNOSIS — J43.9 LUNG BLEBS: Primary | ICD-10-CM

## 2021-06-25 DIAGNOSIS — J93.9 PNEUMOTHORAX, UNSPECIFIED TYPE: Primary | ICD-10-CM

## 2021-06-25 LAB
BASOPHILS # BLD AUTO: 0.08 K/UL (ref 0–0.2)
BASOPHILS NFR BLD: 0.8 % (ref 0–1.9)
CTP QC/QA: YES
DIFFERENTIAL METHOD: ABNORMAL
EOSINOPHIL # BLD AUTO: 0.2 K/UL (ref 0–0.5)
EOSINOPHIL NFR BLD: 1.5 % (ref 0–8)
ERYTHROCYTE [DISTWIDTH] IN BLOOD BY AUTOMATED COUNT: 15 % (ref 11.5–14.5)
HCT VFR BLD AUTO: 46.7 % (ref 40–54)
HGB BLD-MCNC: 15 G/DL (ref 14–18)
IMM GRANULOCYTES # BLD AUTO: 0.04 K/UL (ref 0–0.04)
IMM GRANULOCYTES NFR BLD AUTO: 0.4 % (ref 0–0.5)
LYMPHOCYTES # BLD AUTO: 1.6 K/UL (ref 1–4.8)
LYMPHOCYTES NFR BLD: 16.4 % (ref 18–48)
MCH RBC QN AUTO: 29.1 PG (ref 27–31)
MCHC RBC AUTO-ENTMCNC: 32.1 G/DL (ref 32–36)
MCV RBC AUTO: 91 FL (ref 82–98)
MONOCYTES # BLD AUTO: 0.8 K/UL (ref 0.3–1)
MONOCYTES NFR BLD: 7.7 % (ref 4–15)
NEUTROPHILS # BLD AUTO: 7.2 K/UL (ref 1.8–7.7)
NEUTROPHILS NFR BLD: 73.2 % (ref 38–73)
NRBC BLD-RTO: 0 /100 WBC
PLATELET # BLD AUTO: 392 K/UL (ref 150–450)
PMV BLD AUTO: 9.4 FL (ref 9.2–12.9)
RBC # BLD AUTO: 5.15 M/UL (ref 4.6–6.2)
SARS-COV-2 RDRP RESP QL NAA+PROBE: NEGATIVE
WBC # BLD AUTO: 9.83 K/UL (ref 3.9–12.7)

## 2021-06-25 PROCEDURE — 99285 PR EMERGENCY DEPT VISIT,LEVEL V: ICD-10-PCS | Mod: CS,,, | Performed by: EMERGENCY MEDICINE

## 2021-06-25 PROCEDURE — 71046 X-RAY EXAM CHEST 2 VIEWS: CPT | Mod: TC

## 2021-06-25 PROCEDURE — U0002 COVID-19 LAB TEST NON-CDC: HCPCS | Performed by: PHYSICIAN ASSISTANT

## 2021-06-25 PROCEDURE — 99285 EMERGENCY DEPT VISIT HI MDM: CPT | Mod: 25

## 2021-06-25 PROCEDURE — 99024 POSTOP FOLLOW-UP VISIT: CPT | Mod: S$GLB,,, | Performed by: THORACIC SURGERY (CARDIOTHORACIC VASCULAR SURGERY)

## 2021-06-25 PROCEDURE — 32551 PR TUBE THORACOSTOMY INCLUDES WATER SEAL: ICD-10-PCS | Mod: S$GLB,,, | Performed by: PHYSICIAN ASSISTANT

## 2021-06-25 PROCEDURE — 1126F PR PAIN SEVERITY QUANTIFIED, NO PAIN PRESENT: ICD-10-PCS | Mod: S$GLB,,, | Performed by: THORACIC SURGERY (CARDIOTHORACIC VASCULAR SURGERY)

## 2021-06-25 PROCEDURE — 20600001 HC STEP DOWN PRIVATE ROOM

## 2021-06-25 PROCEDURE — 1126F AMNT PAIN NOTED NONE PRSNT: CPT | Mod: S$GLB,,, | Performed by: THORACIC SURGERY (CARDIOTHORACIC VASCULAR SURGERY)

## 2021-06-25 PROCEDURE — 99999 PR PBB SHADOW E&M-EST. PATIENT-LVL III: CPT | Mod: PBBFAC,,, | Performed by: THORACIC SURGERY (CARDIOTHORACIC VASCULAR SURGERY)

## 2021-06-25 PROCEDURE — 85025 COMPLETE CBC W/AUTO DIFF WBC: CPT | Performed by: EMERGENCY MEDICINE

## 2021-06-25 PROCEDURE — 3008F PR BODY MASS INDEX (BMI) DOCUMENTED: ICD-10-PCS | Mod: CPTII,S$GLB,, | Performed by: THORACIC SURGERY (CARDIOTHORACIC VASCULAR SURGERY)

## 2021-06-25 PROCEDURE — 3008F BODY MASS INDEX DOCD: CPT | Mod: CPTII,S$GLB,, | Performed by: THORACIC SURGERY (CARDIOTHORACIC VASCULAR SURGERY)

## 2021-06-25 PROCEDURE — 71046 X-RAY EXAM CHEST 2 VIEWS: CPT | Mod: 26,,, | Performed by: RADIOLOGY

## 2021-06-25 PROCEDURE — 25000003 PHARM REV CODE 250: Performed by: PHYSICIAN ASSISTANT

## 2021-06-25 PROCEDURE — 99285 EMERGENCY DEPT VISIT HI MDM: CPT | Mod: CS,,, | Performed by: EMERGENCY MEDICINE

## 2021-06-25 PROCEDURE — 99999 PR PBB SHADOW E&M-EST. PATIENT-LVL III: ICD-10-PCS | Mod: PBBFAC,,, | Performed by: THORACIC SURGERY (CARDIOTHORACIC VASCULAR SURGERY)

## 2021-06-25 PROCEDURE — 99024 PR POST-OP FOLLOW-UP VISIT: ICD-10-PCS | Mod: S$GLB,,, | Performed by: THORACIC SURGERY (CARDIOTHORACIC VASCULAR SURGERY)

## 2021-06-25 PROCEDURE — 71046 XR CHEST PA AND LATERAL: ICD-10-PCS | Mod: 26,,, | Performed by: RADIOLOGY

## 2021-06-25 PROCEDURE — 32551 INSERTION OF CHEST TUBE: CPT | Mod: S$GLB,,, | Performed by: PHYSICIAN ASSISTANT

## 2021-06-25 RX ORDER — LIDOCAINE HYDROCHLORIDE 10 MG/ML
1 INJECTION, SOLUTION EPIDURAL; INFILTRATION; INTRACAUDAL; PERINEURAL ONCE
Status: DISCONTINUED | OUTPATIENT
Start: 2021-06-25 | End: 2021-06-29 | Stop reason: HOSPADM

## 2021-06-25 RX ORDER — ACETAMINOPHEN 325 MG/1
650 TABLET ORAL EVERY 4 HOURS PRN
Status: DISCONTINUED | OUTPATIENT
Start: 2021-06-25 | End: 2021-06-29 | Stop reason: HOSPADM

## 2021-06-25 RX ORDER — ENOXAPARIN SODIUM 100 MG/ML
40 INJECTION SUBCUTANEOUS EVERY 24 HOURS
Status: DISCONTINUED | OUTPATIENT
Start: 2021-06-26 | End: 2021-06-29 | Stop reason: HOSPADM

## 2021-06-25 RX ORDER — OXYCODONE HYDROCHLORIDE 5 MG/1
5 TABLET ORAL EVERY 4 HOURS PRN
Status: DISCONTINUED | OUTPATIENT
Start: 2021-06-25 | End: 2021-06-29 | Stop reason: HOSPADM

## 2021-06-25 RX ORDER — ONDANSETRON 8 MG/1
8 TABLET, ORALLY DISINTEGRATING ORAL EVERY 8 HOURS PRN
Status: DISCONTINUED | OUTPATIENT
Start: 2021-06-25 | End: 2021-06-29 | Stop reason: HOSPADM

## 2021-06-25 RX ORDER — ACETAMINOPHEN 325 MG/1
650 TABLET ORAL EVERY 8 HOURS PRN
Status: DISCONTINUED | OUTPATIENT
Start: 2021-06-25 | End: 2021-06-29 | Stop reason: HOSPADM

## 2021-06-25 RX ORDER — SODIUM CHLORIDE 0.9 % (FLUSH) 0.9 %
3 SYRINGE (ML) INJECTION EVERY 8 HOURS
Status: DISCONTINUED | OUTPATIENT
Start: 2021-06-25 | End: 2021-06-29 | Stop reason: HOSPADM

## 2021-06-25 RX ORDER — ONDANSETRON 8 MG/1
8 TABLET, ORALLY DISINTEGRATING ORAL EVERY 8 HOURS PRN
Status: DISCONTINUED | OUTPATIENT
Start: 2021-06-25 | End: 2021-06-25

## 2021-06-25 RX ORDER — OXYCODONE HYDROCHLORIDE 10 MG/1
10 TABLET ORAL EVERY 4 HOURS PRN
Status: DISCONTINUED | OUTPATIENT
Start: 2021-06-25 | End: 2021-06-29 | Stop reason: HOSPADM

## 2021-06-25 RX ORDER — PANTOPRAZOLE SODIUM 40 MG/1
40 TABLET, DELAYED RELEASE ORAL DAILY
Status: DISCONTINUED | OUTPATIENT
Start: 2021-06-25 | End: 2021-06-29 | Stop reason: HOSPADM

## 2021-06-25 RX ADMIN — OXYCODONE HYDROCHLORIDE 10 MG: 10 TABLET ORAL at 02:06

## 2021-06-26 PROCEDURE — A4216 STERILE WATER/SALINE, 10 ML: HCPCS | Performed by: PHYSICIAN ASSISTANT

## 2021-06-26 PROCEDURE — 63600175 PHARM REV CODE 636 W HCPCS: Performed by: PHYSICIAN ASSISTANT

## 2021-06-26 PROCEDURE — 25000003 PHARM REV CODE 250: Performed by: PHYSICIAN ASSISTANT

## 2021-06-26 PROCEDURE — 20600001 HC STEP DOWN PRIVATE ROOM

## 2021-06-26 RX ADMIN — Medication 3 ML: at 04:06

## 2021-06-26 RX ADMIN — PANTOPRAZOLE SODIUM 40 MG: 40 TABLET, DELAYED RELEASE ORAL at 08:06

## 2021-06-26 RX ADMIN — ACETAMINOPHEN 650 MG: 325 TABLET ORAL at 06:06

## 2021-06-26 RX ADMIN — OXYCODONE HYDROCHLORIDE 10 MG: 10 TABLET ORAL at 12:06

## 2021-06-26 RX ADMIN — ENOXAPARIN SODIUM 40 MG: 40 INJECTION SUBCUTANEOUS at 04:06

## 2021-06-27 PROCEDURE — 25000003 PHARM REV CODE 250: Performed by: PHYSICIAN ASSISTANT

## 2021-06-27 PROCEDURE — A4216 STERILE WATER/SALINE, 10 ML: HCPCS | Performed by: PHYSICIAN ASSISTANT

## 2021-06-27 PROCEDURE — 20600001 HC STEP DOWN PRIVATE ROOM

## 2021-06-27 PROCEDURE — 63600175 PHARM REV CODE 636 W HCPCS: Performed by: PHYSICIAN ASSISTANT

## 2021-06-27 RX ORDER — DIPHENHYDRAMINE HCL 25 MG
25 CAPSULE ORAL EVERY 6 HOURS PRN
Status: DISCONTINUED | OUTPATIENT
Start: 2021-06-27 | End: 2021-06-29 | Stop reason: HOSPADM

## 2021-06-27 RX ADMIN — Medication 3 ML: at 02:06

## 2021-06-27 RX ADMIN — ACETAMINOPHEN 650 MG: 325 TABLET ORAL at 07:06

## 2021-06-27 RX ADMIN — ENOXAPARIN SODIUM 40 MG: 40 INJECTION SUBCUTANEOUS at 04:06

## 2021-06-27 RX ADMIN — PANTOPRAZOLE SODIUM 40 MG: 40 TABLET, DELAYED RELEASE ORAL at 08:06

## 2021-06-28 ENCOUNTER — ANESTHESIA (OUTPATIENT)
Dept: SURGERY | Facility: HOSPITAL | Age: 26
DRG: 165 | End: 2021-06-28
Payer: COMMERCIAL

## 2021-06-28 PROCEDURE — 27201423 OPTIME MED/SURG SUP & DEVICES STERILE SUPPLY: Performed by: THORACIC SURGERY (CARDIOTHORACIC VASCULAR SURGERY)

## 2021-06-28 PROCEDURE — D9220A PRA ANESTHESIA: ICD-10-PCS | Mod: ,,, | Performed by: ANESTHESIOLOGY

## 2021-06-28 PROCEDURE — D9220A PRA ANESTHESIA: Mod: ,,, | Performed by: ANESTHESIOLOGY

## 2021-06-28 PROCEDURE — 37000008 HC ANESTHESIA 1ST 15 MINUTES: Performed by: THORACIC SURGERY (CARDIOTHORACIC VASCULAR SURGERY)

## 2021-06-28 PROCEDURE — 94761 N-INVAS EAR/PLS OXIMETRY MLT: CPT

## 2021-06-28 PROCEDURE — 37000009 HC ANESTHESIA EA ADD 15 MINS: Performed by: THORACIC SURGERY (CARDIOTHORACIC VASCULAR SURGERY)

## 2021-06-28 PROCEDURE — 63600175 PHARM REV CODE 636 W HCPCS: Performed by: STUDENT IN AN ORGANIZED HEALTH CARE EDUCATION/TRAINING PROGRAM

## 2021-06-28 PROCEDURE — 63600175 PHARM REV CODE 636 W HCPCS: Performed by: PHYSICIAN ASSISTANT

## 2021-06-28 PROCEDURE — C1757 CATH, THROMBECTOMY/EMBOLECT: HCPCS | Performed by: THORACIC SURGERY (CARDIOTHORACIC VASCULAR SURGERY)

## 2021-06-28 PROCEDURE — 36000706: Performed by: THORACIC SURGERY (CARDIOTHORACIC VASCULAR SURGERY)

## 2021-06-28 PROCEDURE — 36000707: Performed by: THORACIC SURGERY (CARDIOTHORACIC VASCULAR SURGERY)

## 2021-06-28 PROCEDURE — 27800903 OPTIME MED/SURG SUP & DEVICES OTHER IMPLANTS: Performed by: THORACIC SURGERY (CARDIOTHORACIC VASCULAR SURGERY)

## 2021-06-28 PROCEDURE — 31647 BRONCHIAL VALVE INIT INSERT: CPT | Mod: 78,,, | Performed by: THORACIC SURGERY (CARDIOTHORACIC VASCULAR SURGERY)

## 2021-06-28 PROCEDURE — 31647 PR BRONCHOSCOPY; VALVE INSERTION - INITIAL LOBE: ICD-10-PCS | Mod: 78,,, | Performed by: THORACIC SURGERY (CARDIOTHORACIC VASCULAR SURGERY)

## 2021-06-28 PROCEDURE — 25000003 PHARM REV CODE 250: Performed by: PHYSICIAN ASSISTANT

## 2021-06-28 PROCEDURE — 25000003 PHARM REV CODE 250: Performed by: STUDENT IN AN ORGANIZED HEALTH CARE EDUCATION/TRAINING PROGRAM

## 2021-06-28 PROCEDURE — 20600001 HC STEP DOWN PRIVATE ROOM

## 2021-06-28 PROCEDURE — 71000015 HC POSTOP RECOV 1ST HR: Performed by: THORACIC SURGERY (CARDIOTHORACIC VASCULAR SURGERY)

## 2021-06-28 PROCEDURE — 71000033 HC RECOVERY, INTIAL HOUR: Performed by: THORACIC SURGERY (CARDIOTHORACIC VASCULAR SURGERY)

## 2021-06-28 DEVICE — CARTRIDGE VALVE IN 9MM HUD: Type: IMPLANTABLE DEVICE | Site: BRONCHUS | Status: FUNCTIONAL

## 2021-06-28 DEVICE — IMPLANTABLE DEVICE: Type: IMPLANTABLE DEVICE | Site: BRONCHUS | Status: FUNCTIONAL

## 2021-06-28 RX ORDER — DEXAMETHASONE SODIUM PHOSPHATE 4 MG/ML
INJECTION, SOLUTION INTRA-ARTICULAR; INTRALESIONAL; INTRAMUSCULAR; INTRAVENOUS; SOFT TISSUE
Status: DISCONTINUED | OUTPATIENT
Start: 2021-06-28 | End: 2021-06-28

## 2021-06-28 RX ORDER — ROCURONIUM BROMIDE 10 MG/ML
INJECTION, SOLUTION INTRAVENOUS
Status: DISCONTINUED | OUTPATIENT
Start: 2021-06-28 | End: 2021-06-28

## 2021-06-28 RX ORDER — PHENYLEPHRINE HCL IN 0.9% NACL 1 MG/10 ML
SYRINGE (ML) INTRAVENOUS
Status: DISCONTINUED | OUTPATIENT
Start: 2021-06-28 | End: 2021-06-28

## 2021-06-28 RX ORDER — HALOPERIDOL 5 MG/ML
0.5 INJECTION INTRAMUSCULAR EVERY 10 MIN PRN
Status: DISCONTINUED | OUTPATIENT
Start: 2021-06-28 | End: 2021-06-29 | Stop reason: HOSPADM

## 2021-06-28 RX ORDER — MIDAZOLAM HYDROCHLORIDE 1 MG/ML
INJECTION, SOLUTION INTRAMUSCULAR; INTRAVENOUS
Status: DISCONTINUED | OUTPATIENT
Start: 2021-06-28 | End: 2021-06-28

## 2021-06-28 RX ORDER — SODIUM CHLORIDE 0.9 % (FLUSH) 0.9 %
10 SYRINGE (ML) INJECTION
Status: DISCONTINUED | OUTPATIENT
Start: 2021-06-28 | End: 2021-06-29 | Stop reason: HOSPADM

## 2021-06-28 RX ORDER — FENTANYL CITRATE 50 UG/ML
INJECTION, SOLUTION INTRAMUSCULAR; INTRAVENOUS
Status: DISCONTINUED | OUTPATIENT
Start: 2021-06-28 | End: 2021-06-28

## 2021-06-28 RX ORDER — ACETAMINOPHEN 10 MG/ML
INJECTION, SOLUTION INTRAVENOUS
Status: DISCONTINUED | OUTPATIENT
Start: 2021-06-28 | End: 2021-06-28

## 2021-06-28 RX ORDER — FLUMAZENIL 0.1 MG/ML
INJECTION INTRAVENOUS
Status: DISCONTINUED | OUTPATIENT
Start: 2021-06-28 | End: 2021-06-28

## 2021-06-28 RX ORDER — DEXMEDETOMIDINE HYDROCHLORIDE 100 UG/ML
INJECTION, SOLUTION INTRAVENOUS
Status: DISCONTINUED | OUTPATIENT
Start: 2021-06-28 | End: 2021-06-28

## 2021-06-28 RX ORDER — PROPOFOL 10 MG/ML
VIAL (ML) INTRAVENOUS CONTINUOUS PRN
Status: DISCONTINUED | OUTPATIENT
Start: 2021-06-28 | End: 2021-06-28

## 2021-06-28 RX ORDER — NALOXONE HCL 0.4 MG/ML
VIAL (ML) INJECTION
Status: DISCONTINUED | OUTPATIENT
Start: 2021-06-28 | End: 2021-06-28

## 2021-06-28 RX ORDER — PROPOFOL 10 MG/ML
VIAL (ML) INTRAVENOUS
Status: DISCONTINUED | OUTPATIENT
Start: 2021-06-28 | End: 2021-06-28

## 2021-06-28 RX ORDER — SUCCINYLCHOLINE CHLORIDE 20 MG/ML
INJECTION INTRAMUSCULAR; INTRAVENOUS
Status: DISCONTINUED | OUTPATIENT
Start: 2021-06-28 | End: 2021-06-28

## 2021-06-28 RX ORDER — LIDOCAINE HYDROCHLORIDE 20 MG/ML
INJECTION, SOLUTION EPIDURAL; INFILTRATION; INTRACAUDAL; PERINEURAL
Status: DISCONTINUED | OUTPATIENT
Start: 2021-06-28 | End: 2021-06-28

## 2021-06-28 RX ADMIN — SODIUM CHLORIDE: 0.9 INJECTION, SOLUTION INTRAVENOUS at 10:06

## 2021-06-28 RX ADMIN — NALXONE HYDROCHLORIDE 40 MCG: 0.4 INJECTION INTRAMUSCULAR; INTRAVENOUS; SUBCUTANEOUS at 12:06

## 2021-06-28 RX ADMIN — ROCURONIUM BROMIDE 10 MG: 10 INJECTION INTRAVENOUS at 10:06

## 2021-06-28 RX ADMIN — DEXAMETHASONE SODIUM PHOSPHATE 12 MG: 4 INJECTION, SOLUTION INTRAMUSCULAR; INTRAVENOUS at 11:06

## 2021-06-28 RX ADMIN — PANTOPRAZOLE SODIUM 40 MG: 40 TABLET, DELAYED RELEASE ORAL at 08:06

## 2021-06-28 RX ADMIN — ENOXAPARIN SODIUM 40 MG: 40 INJECTION SUBCUTANEOUS at 04:06

## 2021-06-28 RX ADMIN — PROPOFOL 170 MG: 10 INJECTION, EMULSION INTRAVENOUS at 11:06

## 2021-06-28 RX ADMIN — DEXMEDETOMIDINE HYDROCHLORIDE 8 MCG: 100 INJECTION, SOLUTION, CONCENTRATE INTRAVENOUS at 11:06

## 2021-06-28 RX ADMIN — FLUMAZENIL 0.25 MG: 0.1 INJECTION, SOLUTION INTRAVENOUS at 12:06

## 2021-06-28 RX ADMIN — ACETAMINOPHEN 650 MG: 325 TABLET ORAL at 05:06

## 2021-06-28 RX ADMIN — SUCCINYLCHOLINE CHLORIDE 120 MG: 20 INJECTION, SOLUTION INTRAMUSCULAR; INTRAVENOUS; PARENTERAL at 10:06

## 2021-06-28 RX ADMIN — NALXONE HYDROCHLORIDE 80 MCG: 0.4 INJECTION INTRAMUSCULAR; INTRAVENOUS; SUBCUTANEOUS at 12:06

## 2021-06-28 RX ADMIN — PROPOFOL 50 MG: 10 INJECTION, EMULSION INTRAVENOUS at 11:06

## 2021-06-28 RX ADMIN — LIDOCAINE HYDROCHLORIDE 50 MG: 20 INJECTION, SOLUTION EPIDURAL; INFILTRATION; INTRACAUDAL at 10:06

## 2021-06-28 RX ADMIN — ACETAMINOPHEN 1000 MG: 10 INJECTION INTRAVENOUS at 11:06

## 2021-06-28 RX ADMIN — Medication 100 MCG: at 12:06

## 2021-06-28 RX ADMIN — Medication 200 MCG: at 11:06

## 2021-06-28 RX ADMIN — Medication 100 MCG: at 11:06

## 2021-06-28 RX ADMIN — FENTANYL CITRATE 100 MCG: 50 INJECTION INTRAMUSCULAR; INTRAVENOUS at 11:06

## 2021-06-28 RX ADMIN — MIDAZOLAM 2 MG: 1 INJECTION INTRAMUSCULAR; INTRAVENOUS at 10:06

## 2021-06-28 RX ADMIN — Medication 150 MCG/KG/MIN: at 11:06

## 2021-06-29 VITALS
BODY MASS INDEX: 15.14 KG/M2 | OXYGEN SATURATION: 99 % | RESPIRATION RATE: 16 BRPM | WEIGHT: 117.94 LBS | HEART RATE: 104 BPM | HEIGHT: 74 IN | SYSTOLIC BLOOD PRESSURE: 109 MMHG | TEMPERATURE: 99 F | DIASTOLIC BLOOD PRESSURE: 64 MMHG

## 2021-06-29 DIAGNOSIS — Z87.09 HX OF PNEUMOTHORAX: Primary | ICD-10-CM

## 2021-06-29 LAB
A1AT SERPL-MCNC: 157 MG/DL (ref 100–190)
ALBUMIN SERPL BCP-MCNC: 3.6 G/DL (ref 3.5–5.2)
ALP SERPL-CCNC: 162 U/L (ref 55–135)
ALT SERPL W/O P-5'-P-CCNC: 14 U/L (ref 10–44)
ANION GAP SERPL CALC-SCNC: 8 MMOL/L (ref 8–16)
AST SERPL-CCNC: 12 U/L (ref 10–40)
BILIRUB SERPL-MCNC: 0.4 MG/DL (ref 0.1–1)
BUN SERPL-MCNC: 7 MG/DL (ref 6–20)
CALCIUM SERPL-MCNC: 9.3 MG/DL (ref 8.7–10.5)
CHLORIDE SERPL-SCNC: 103 MMOL/L (ref 95–110)
CO2 SERPL-SCNC: 26 MMOL/L (ref 23–29)
CREAT SERPL-MCNC: 0.7 MG/DL (ref 0.5–1.4)
EST. GFR  (AFRICAN AMERICAN): >60 ML/MIN/1.73 M^2
EST. GFR  (NON AFRICAN AMERICAN): >60 ML/MIN/1.73 M^2
GLUCOSE SERPL-MCNC: 129 MG/DL (ref 70–110)
POTASSIUM SERPL-SCNC: 3.5 MMOL/L (ref 3.5–5.1)
PROT SERPL-MCNC: 6.9 G/DL (ref 6–8.4)
SODIUM SERPL-SCNC: 137 MMOL/L (ref 136–145)

## 2021-06-29 PROCEDURE — 80053 COMPREHEN METABOLIC PANEL: CPT | Performed by: PHYSICIAN ASSISTANT

## 2021-06-29 PROCEDURE — 25000003 PHARM REV CODE 250: Performed by: PHYSICIAN ASSISTANT

## 2021-06-29 PROCEDURE — 25000003 PHARM REV CODE 250: Performed by: STUDENT IN AN ORGANIZED HEALTH CARE EDUCATION/TRAINING PROGRAM

## 2021-06-29 PROCEDURE — 82103 ALPHA-1-ANTITRYPSIN TOTAL: CPT | Performed by: PHYSICIAN ASSISTANT

## 2021-06-29 PROCEDURE — 36415 COLL VENOUS BLD VENIPUNCTURE: CPT | Performed by: PHYSICIAN ASSISTANT

## 2021-06-29 RX ORDER — BISACODYL 5 MG
5 TABLET, DELAYED RELEASE (ENTERIC COATED) ORAL DAILY PRN
Status: DISCONTINUED | OUTPATIENT
Start: 2021-06-29 | End: 2021-06-29 | Stop reason: HOSPADM

## 2021-06-29 RX ORDER — ACETAMINOPHEN 325 MG/1
650 TABLET ORAL EVERY 8 HOURS PRN
Refills: 0 | COMMUNITY
Start: 2021-06-29 | End: 2022-03-09

## 2021-06-29 RX ORDER — POLYETHYLENE GLYCOL 3350 17 G/17G
17 POWDER, FOR SOLUTION ORAL DAILY
Status: DISCONTINUED | OUTPATIENT
Start: 2021-06-29 | End: 2021-06-29 | Stop reason: HOSPADM

## 2021-06-29 RX ORDER — IBUPROFEN 600 MG/1
600 TABLET ORAL EVERY 8 HOURS PRN
COMMUNITY
Start: 2021-06-29 | End: 2021-07-26

## 2021-06-29 RX ADMIN — BISACODYL 5 MG: 5 TABLET, COATED ORAL at 11:06

## 2021-06-29 RX ADMIN — POLYETHYLENE GLYCOL 3350 17 G: 17 POWDER, FOR SOLUTION ORAL at 11:06

## 2021-06-29 RX ADMIN — ACETAMINOPHEN 650 MG: 325 TABLET ORAL at 05:06

## 2021-06-29 RX ADMIN — PANTOPRAZOLE SODIUM 40 MG: 40 TABLET, DELAYED RELEASE ORAL at 08:06

## 2021-07-01 ENCOUNTER — PATIENT OUTREACH (OUTPATIENT)
Dept: ADMINISTRATIVE | Facility: CLINIC | Age: 26
End: 2021-07-01

## 2021-07-02 ENCOUNTER — HOSPITAL ENCOUNTER (OUTPATIENT)
Dept: RADIOLOGY | Facility: HOSPITAL | Age: 26
Discharge: HOME | End: 2021-07-02
Attending: PHYSICIAN ASSISTANT
Payer: COMMERCIAL

## 2021-07-02 ENCOUNTER — OFFICE VISIT (OUTPATIENT)
Dept: CARDIOTHORACIC SURGERY | Facility: CLINIC | Age: 26
End: 2021-07-02
Payer: COMMERCIAL

## 2021-07-02 VITALS
SYSTOLIC BLOOD PRESSURE: 120 MMHG | BODY MASS INDEX: 15.56 KG/M2 | HEIGHT: 74 IN | OXYGEN SATURATION: 99 % | HEART RATE: 93 BPM | DIASTOLIC BLOOD PRESSURE: 78 MMHG | WEIGHT: 121.25 LBS

## 2021-07-02 DIAGNOSIS — Z87.09 HX OF PNEUMOTHORAX: ICD-10-CM

## 2021-07-02 DIAGNOSIS — Z87.09 HX OF PNEUMOTHORAX: Primary | ICD-10-CM

## 2021-07-02 DIAGNOSIS — J93.82 AIR LEAK: ICD-10-CM

## 2021-07-02 PROCEDURE — 3008F PR BODY MASS INDEX (BMI) DOCUMENTED: ICD-10-PCS | Mod: CPTII,S$GLB,, | Performed by: THORACIC SURGERY (CARDIOTHORACIC VASCULAR SURGERY)

## 2021-07-02 PROCEDURE — 1126F PR PAIN SEVERITY QUANTIFIED, NO PAIN PRESENT: ICD-10-PCS | Mod: S$GLB,,, | Performed by: THORACIC SURGERY (CARDIOTHORACIC VASCULAR SURGERY)

## 2021-07-02 PROCEDURE — 71046 X-RAY EXAM CHEST 2 VIEWS: CPT | Mod: 26,,, | Performed by: RADIOLOGY

## 2021-07-02 PROCEDURE — 71046 X-RAY EXAM CHEST 2 VIEWS: CPT | Mod: TC

## 2021-07-02 PROCEDURE — 99999 PR PBB SHADOW E&M-EST. PATIENT-LVL IV: CPT | Mod: PBBFAC,,, | Performed by: THORACIC SURGERY (CARDIOTHORACIC VASCULAR SURGERY)

## 2021-07-02 PROCEDURE — 99024 PR POST-OP FOLLOW-UP VISIT: ICD-10-PCS | Mod: S$GLB,,, | Performed by: THORACIC SURGERY (CARDIOTHORACIC VASCULAR SURGERY)

## 2021-07-02 PROCEDURE — 99024 POSTOP FOLLOW-UP VISIT: CPT | Mod: S$GLB,,, | Performed by: THORACIC SURGERY (CARDIOTHORACIC VASCULAR SURGERY)

## 2021-07-02 PROCEDURE — 71046 XR CHEST PA AND LATERAL: ICD-10-PCS | Mod: 26,,, | Performed by: RADIOLOGY

## 2021-07-02 PROCEDURE — 1126F AMNT PAIN NOTED NONE PRSNT: CPT | Mod: S$GLB,,, | Performed by: THORACIC SURGERY (CARDIOTHORACIC VASCULAR SURGERY)

## 2021-07-02 PROCEDURE — 99999 PR PBB SHADOW E&M-EST. PATIENT-LVL IV: ICD-10-PCS | Mod: PBBFAC,,, | Performed by: THORACIC SURGERY (CARDIOTHORACIC VASCULAR SURGERY)

## 2021-07-02 PROCEDURE — 3008F BODY MASS INDEX DOCD: CPT | Mod: CPTII,S$GLB,, | Performed by: THORACIC SURGERY (CARDIOTHORACIC VASCULAR SURGERY)

## 2021-07-07 ENCOUNTER — OFFICE VISIT (OUTPATIENT)
Dept: CARDIOTHORACIC SURGERY | Facility: CLINIC | Age: 26
End: 2021-07-07
Payer: COMMERCIAL

## 2021-07-07 ENCOUNTER — HOSPITAL ENCOUNTER (OUTPATIENT)
Dept: RADIOLOGY | Facility: HOSPITAL | Age: 26
Discharge: HOME | End: 2021-07-07
Attending: PHYSICIAN ASSISTANT
Payer: COMMERCIAL

## 2021-07-07 VITALS
DIASTOLIC BLOOD PRESSURE: 73 MMHG | OXYGEN SATURATION: 99 % | SYSTOLIC BLOOD PRESSURE: 107 MMHG | HEART RATE: 86 BPM | HEIGHT: 74 IN | BODY MASS INDEX: 15.65 KG/M2 | WEIGHT: 121.94 LBS

## 2021-07-07 DIAGNOSIS — Z87.09 HX OF PNEUMOTHORAX: ICD-10-CM

## 2021-07-07 DIAGNOSIS — J93.12 SECONDARY SPONTANEOUS PNEUMOTHORAX: Primary | ICD-10-CM

## 2021-07-07 PROCEDURE — 71046 X-RAY EXAM CHEST 2 VIEWS: CPT | Mod: TC

## 2021-07-07 PROCEDURE — 99999 PR PBB SHADOW E&M-EST. PATIENT-LVL IV: ICD-10-PCS | Mod: PBBFAC,,, | Performed by: THORACIC SURGERY (CARDIOTHORACIC VASCULAR SURGERY)

## 2021-07-07 PROCEDURE — 99024 POSTOP FOLLOW-UP VISIT: CPT | Mod: S$GLB,,, | Performed by: THORACIC SURGERY (CARDIOTHORACIC VASCULAR SURGERY)

## 2021-07-07 PROCEDURE — 1125F AMNT PAIN NOTED PAIN PRSNT: CPT | Mod: S$GLB,,, | Performed by: THORACIC SURGERY (CARDIOTHORACIC VASCULAR SURGERY)

## 2021-07-07 PROCEDURE — 1111F PR DISCHARGE MEDS RECONCILED W/ CURRENT OUTPATIENT MED LIST: ICD-10-PCS | Mod: CPTII,S$GLB,, | Performed by: THORACIC SURGERY (CARDIOTHORACIC VASCULAR SURGERY)

## 2021-07-07 PROCEDURE — 71046 XR CHEST PA AND LATERAL: ICD-10-PCS | Mod: 26,,, | Performed by: RADIOLOGY

## 2021-07-07 PROCEDURE — 99999 PR PBB SHADOW E&M-EST. PATIENT-LVL IV: CPT | Mod: PBBFAC,,, | Performed by: THORACIC SURGERY (CARDIOTHORACIC VASCULAR SURGERY)

## 2021-07-07 PROCEDURE — 3008F PR BODY MASS INDEX (BMI) DOCUMENTED: ICD-10-PCS | Mod: CPTII,S$GLB,, | Performed by: THORACIC SURGERY (CARDIOTHORACIC VASCULAR SURGERY)

## 2021-07-07 PROCEDURE — 1111F DSCHRG MED/CURRENT MED MERGE: CPT | Mod: CPTII,S$GLB,, | Performed by: THORACIC SURGERY (CARDIOTHORACIC VASCULAR SURGERY)

## 2021-07-07 PROCEDURE — 71046 X-RAY EXAM CHEST 2 VIEWS: CPT | Mod: 26,,, | Performed by: RADIOLOGY

## 2021-07-07 PROCEDURE — 99024 PR POST-OP FOLLOW-UP VISIT: ICD-10-PCS | Mod: S$GLB,,, | Performed by: THORACIC SURGERY (CARDIOTHORACIC VASCULAR SURGERY)

## 2021-07-07 PROCEDURE — 1125F PR PAIN SEVERITY QUANTIFIED, PAIN PRESENT: ICD-10-PCS | Mod: S$GLB,,, | Performed by: THORACIC SURGERY (CARDIOTHORACIC VASCULAR SURGERY)

## 2021-07-07 PROCEDURE — 3008F BODY MASS INDEX DOCD: CPT | Mod: CPTII,S$GLB,, | Performed by: THORACIC SURGERY (CARDIOTHORACIC VASCULAR SURGERY)

## 2021-07-09 ENCOUNTER — OFFICE VISIT (OUTPATIENT)
Dept: CARDIOTHORACIC SURGERY | Facility: CLINIC | Age: 26
End: 2021-07-09
Payer: COMMERCIAL

## 2021-07-09 ENCOUNTER — HOSPITAL ENCOUNTER (OUTPATIENT)
Dept: RADIOLOGY | Facility: HOSPITAL | Age: 26
Discharge: HOME | End: 2021-07-09
Attending: THORACIC SURGERY (CARDIOTHORACIC VASCULAR SURGERY)
Payer: COMMERCIAL

## 2021-07-09 VITALS
DIASTOLIC BLOOD PRESSURE: 75 MMHG | HEIGHT: 74 IN | OXYGEN SATURATION: 97 % | SYSTOLIC BLOOD PRESSURE: 121 MMHG | BODY MASS INDEX: 15.53 KG/M2 | HEART RATE: 87 BPM | WEIGHT: 121 LBS

## 2021-07-09 DIAGNOSIS — J93.9 RECURRENT PNEUMOTHORAX: Primary | ICD-10-CM

## 2021-07-09 DIAGNOSIS — J43.9 LUNG BLEBS: ICD-10-CM

## 2021-07-09 PROCEDURE — 99024 PR POST-OP FOLLOW-UP VISIT: ICD-10-PCS | Mod: S$GLB,,, | Performed by: PHYSICIAN ASSISTANT

## 2021-07-09 PROCEDURE — 71046 X-RAY EXAM CHEST 2 VIEWS: CPT | Mod: TC

## 2021-07-09 PROCEDURE — 71046 XR CHEST PA AND LATERAL: ICD-10-PCS | Mod: 26,,, | Performed by: RADIOLOGY

## 2021-07-09 PROCEDURE — 1126F AMNT PAIN NOTED NONE PRSNT: CPT | Mod: S$GLB,,, | Performed by: PHYSICIAN ASSISTANT

## 2021-07-09 PROCEDURE — 3008F BODY MASS INDEX DOCD: CPT | Mod: CPTII,S$GLB,, | Performed by: PHYSICIAN ASSISTANT

## 2021-07-09 PROCEDURE — 1126F PR PAIN SEVERITY QUANTIFIED, NO PAIN PRESENT: ICD-10-PCS | Mod: S$GLB,,, | Performed by: PHYSICIAN ASSISTANT

## 2021-07-09 PROCEDURE — 99999 PR PBB SHADOW E&M-EST. PATIENT-LVL III: ICD-10-PCS | Mod: PBBFAC,,, | Performed by: PHYSICIAN ASSISTANT

## 2021-07-09 PROCEDURE — 71046 X-RAY EXAM CHEST 2 VIEWS: CPT | Mod: 26,,, | Performed by: RADIOLOGY

## 2021-07-09 PROCEDURE — 99999 PR PBB SHADOW E&M-EST. PATIENT-LVL III: CPT | Mod: PBBFAC,,, | Performed by: PHYSICIAN ASSISTANT

## 2021-07-09 PROCEDURE — 3008F PR BODY MASS INDEX (BMI) DOCUMENTED: ICD-10-PCS | Mod: CPTII,S$GLB,, | Performed by: PHYSICIAN ASSISTANT

## 2021-07-09 PROCEDURE — 99024 POSTOP FOLLOW-UP VISIT: CPT | Mod: S$GLB,,, | Performed by: PHYSICIAN ASSISTANT

## 2021-07-20 ENCOUNTER — HOSPITAL ENCOUNTER (OUTPATIENT)
Dept: RADIOLOGY | Facility: HOSPITAL | Age: 26
Discharge: HOME | End: 2021-07-20
Attending: PHYSICIAN ASSISTANT
Payer: COMMERCIAL

## 2021-07-20 DIAGNOSIS — Z87.09 HX OF PNEUMOTHORAX: ICD-10-CM

## 2021-07-20 DIAGNOSIS — Z87.09 HX OF PNEUMOTHORAX: Primary | ICD-10-CM

## 2021-07-20 PROCEDURE — 71046 X-RAY EXAM CHEST 2 VIEWS: CPT | Mod: 26,,, | Performed by: RADIOLOGY

## 2021-07-20 PROCEDURE — 71046 XR CHEST PA AND LATERAL: ICD-10-PCS | Mod: 26,,, | Performed by: RADIOLOGY

## 2021-07-20 PROCEDURE — 71046 X-RAY EXAM CHEST 2 VIEWS: CPT | Mod: TC

## 2021-07-26 ENCOUNTER — OFFICE VISIT (OUTPATIENT)
Dept: INTERNAL MEDICINE | Facility: CLINIC | Age: 26
End: 2021-07-26
Payer: COMMERCIAL

## 2021-07-26 VITALS
DIASTOLIC BLOOD PRESSURE: 70 MMHG | HEART RATE: 87 BPM | BODY MASS INDEX: 16.84 KG/M2 | WEIGHT: 131.19 LBS | SYSTOLIC BLOOD PRESSURE: 114 MMHG | HEIGHT: 74 IN | OXYGEN SATURATION: 98 %

## 2021-07-26 DIAGNOSIS — J93.9 RECURRENT PNEUMOTHORAX: Primary | ICD-10-CM

## 2021-07-26 DIAGNOSIS — K21.9 GASTROESOPHAGEAL REFLUX DISEASE, UNSPECIFIED WHETHER ESOPHAGITIS PRESENT: ICD-10-CM

## 2021-07-26 DIAGNOSIS — J43.9 LUNG BLEBS: ICD-10-CM

## 2021-07-26 PROCEDURE — 99999 PR PBB SHADOW E&M-EST. PATIENT-LVL III: CPT | Mod: PBBFAC,,, | Performed by: INTERNAL MEDICINE

## 2021-07-26 PROCEDURE — 99214 PR OFFICE/OUTPT VISIT, EST, LEVL IV, 30-39 MIN: ICD-10-PCS | Mod: S$GLB,,, | Performed by: INTERNAL MEDICINE

## 2021-07-26 PROCEDURE — 3008F PR BODY MASS INDEX (BMI) DOCUMENTED: ICD-10-PCS | Mod: CPTII,S$GLB,, | Performed by: INTERNAL MEDICINE

## 2021-07-26 PROCEDURE — 1126F AMNT PAIN NOTED NONE PRSNT: CPT | Mod: CPTII,S$GLB,, | Performed by: INTERNAL MEDICINE

## 2021-07-26 PROCEDURE — 1111F PR DISCHARGE MEDS RECONCILED W/ CURRENT OUTPATIENT MED LIST: ICD-10-PCS | Mod: CPTII,S$GLB,, | Performed by: INTERNAL MEDICINE

## 2021-07-26 PROCEDURE — 1126F PR PAIN SEVERITY QUANTIFIED, NO PAIN PRESENT: ICD-10-PCS | Mod: CPTII,S$GLB,, | Performed by: INTERNAL MEDICINE

## 2021-07-26 PROCEDURE — 99214 OFFICE O/P EST MOD 30 MIN: CPT | Mod: S$GLB,,, | Performed by: INTERNAL MEDICINE

## 2021-07-26 PROCEDURE — 99999 PR PBB SHADOW E&M-EST. PATIENT-LVL III: ICD-10-PCS | Mod: PBBFAC,,, | Performed by: INTERNAL MEDICINE

## 2021-07-26 PROCEDURE — 1160F RVW MEDS BY RX/DR IN RCRD: CPT | Mod: CPTII,S$GLB,, | Performed by: INTERNAL MEDICINE

## 2021-07-26 PROCEDURE — 1111F DSCHRG MED/CURRENT MED MERGE: CPT | Mod: CPTII,S$GLB,, | Performed by: INTERNAL MEDICINE

## 2021-07-26 PROCEDURE — 3008F BODY MASS INDEX DOCD: CPT | Mod: CPTII,S$GLB,, | Performed by: INTERNAL MEDICINE

## 2021-07-26 PROCEDURE — 1159F PR MEDICATION LIST DOCUMENTED IN MEDICAL RECORD: ICD-10-PCS | Mod: CPTII,S$GLB,, | Performed by: INTERNAL MEDICINE

## 2021-07-26 PROCEDURE — 1159F MED LIST DOCD IN RCRD: CPT | Mod: CPTII,S$GLB,, | Performed by: INTERNAL MEDICINE

## 2021-07-26 PROCEDURE — 1160F PR REVIEW ALL MEDS BY PRESCRIBER/CLIN PHARMACIST DOCUMENTED: ICD-10-PCS | Mod: CPTII,S$GLB,, | Performed by: INTERNAL MEDICINE

## 2021-09-27 ENCOUNTER — OFFICE VISIT (OUTPATIENT)
Dept: INTERNAL MEDICINE | Facility: CLINIC | Age: 26
End: 2021-09-27
Payer: COMMERCIAL

## 2021-09-27 VITALS
HEART RATE: 98 BPM | SYSTOLIC BLOOD PRESSURE: 118 MMHG | OXYGEN SATURATION: 98 % | DIASTOLIC BLOOD PRESSURE: 70 MMHG | HEIGHT: 74 IN | WEIGHT: 130.5 LBS | BODY MASS INDEX: 16.75 KG/M2

## 2021-09-27 DIAGNOSIS — J93.9 RECURRENT PNEUMOTHORAX: ICD-10-CM

## 2021-09-27 DIAGNOSIS — J43.9 LUNG BLEBS: Primary | ICD-10-CM

## 2021-09-27 PROCEDURE — 3078F PR MOST RECENT DIASTOLIC BLOOD PRESSURE < 80 MM HG: ICD-10-PCS | Mod: CPTII,S$GLB,, | Performed by: INTERNAL MEDICINE

## 2021-09-27 PROCEDURE — 1159F PR MEDICATION LIST DOCUMENTED IN MEDICAL RECORD: ICD-10-PCS | Mod: CPTII,S$GLB,, | Performed by: INTERNAL MEDICINE

## 2021-09-27 PROCEDURE — 1160F PR REVIEW ALL MEDS BY PRESCRIBER/CLIN PHARMACIST DOCUMENTED: ICD-10-PCS | Mod: CPTII,S$GLB,, | Performed by: INTERNAL MEDICINE

## 2021-09-27 PROCEDURE — 3008F PR BODY MASS INDEX (BMI) DOCUMENTED: ICD-10-PCS | Mod: CPTII,S$GLB,, | Performed by: INTERNAL MEDICINE

## 2021-09-27 PROCEDURE — 3074F SYST BP LT 130 MM HG: CPT | Mod: CPTII,S$GLB,, | Performed by: INTERNAL MEDICINE

## 2021-09-27 PROCEDURE — 3078F DIAST BP <80 MM HG: CPT | Mod: CPTII,S$GLB,, | Performed by: INTERNAL MEDICINE

## 2021-09-27 PROCEDURE — 3008F BODY MASS INDEX DOCD: CPT | Mod: CPTII,S$GLB,, | Performed by: INTERNAL MEDICINE

## 2021-09-27 PROCEDURE — 1160F RVW MEDS BY RX/DR IN RCRD: CPT | Mod: CPTII,S$GLB,, | Performed by: INTERNAL MEDICINE

## 2021-09-27 PROCEDURE — 99999 PR PBB SHADOW E&M-EST. PATIENT-LVL IV: CPT | Mod: PBBFAC,,, | Performed by: INTERNAL MEDICINE

## 2021-09-27 PROCEDURE — 3074F PR MOST RECENT SYSTOLIC BLOOD PRESSURE < 130 MM HG: ICD-10-PCS | Mod: CPTII,S$GLB,, | Performed by: INTERNAL MEDICINE

## 2021-09-27 PROCEDURE — 1159F MED LIST DOCD IN RCRD: CPT | Mod: CPTII,S$GLB,, | Performed by: INTERNAL MEDICINE

## 2021-09-27 PROCEDURE — 99214 OFFICE O/P EST MOD 30 MIN: CPT | Mod: S$GLB,,, | Performed by: INTERNAL MEDICINE

## 2021-09-27 PROCEDURE — 99214 PR OFFICE/OUTPT VISIT, EST, LEVL IV, 30-39 MIN: ICD-10-PCS | Mod: S$GLB,,, | Performed by: INTERNAL MEDICINE

## 2021-09-27 PROCEDURE — 99999 PR PBB SHADOW E&M-EST. PATIENT-LVL IV: ICD-10-PCS | Mod: PBBFAC,,, | Performed by: INTERNAL MEDICINE

## 2021-10-07 DIAGNOSIS — J93.82 PULMONARY AIR LEAK: Primary | ICD-10-CM

## 2021-10-22 DIAGNOSIS — Z01.818 PRE-OP TESTING: ICD-10-CM

## 2021-10-23 ENCOUNTER — LAB VISIT (OUTPATIENT)
Dept: SPORTS MEDICINE | Facility: CLINIC | Age: 26
End: 2021-10-23
Payer: COMMERCIAL

## 2021-10-23 DIAGNOSIS — J93.82 PULMONARY AIR LEAK: ICD-10-CM

## 2021-10-23 PROCEDURE — U0003 INFECTIOUS AGENT DETECTION BY NUCLEIC ACID (DNA OR RNA); SEVERE ACUTE RESPIRATORY SYNDROME CORONAVIRUS 2 (SARS-COV-2) (CORONAVIRUS DISEASE [COVID-19]), AMPLIFIED PROBE TECHNIQUE, MAKING USE OF HIGH THROUGHPUT TECHNOLOGIES AS DESCRIBED BY CMS-2020-01-R: HCPCS | Performed by: PHYSICIAN ASSISTANT

## 2021-10-23 PROCEDURE — U0005 INFEC AGEN DETEC AMPLI PROBE: HCPCS | Performed by: PHYSICIAN ASSISTANT

## 2021-10-24 ENCOUNTER — ANESTHESIA EVENT (OUTPATIENT)
Dept: SURGERY | Facility: HOSPITAL | Age: 26
End: 2021-10-24
Payer: COMMERCIAL

## 2021-10-24 LAB
SARS-COV-2 RNA RESP QL NAA+PROBE: NOT DETECTED
SARS-COV-2- CYCLE NUMBER: NORMAL

## 2021-10-25 ENCOUNTER — HOSPITAL ENCOUNTER (OUTPATIENT)
Facility: HOSPITAL | Age: 26
Discharge: HOME | End: 2021-10-25
Attending: THORACIC SURGERY (CARDIOTHORACIC VASCULAR SURGERY) | Admitting: THORACIC SURGERY (CARDIOTHORACIC VASCULAR SURGERY)
Payer: COMMERCIAL

## 2021-10-25 ENCOUNTER — ANESTHESIA (OUTPATIENT)
Dept: SURGERY | Facility: HOSPITAL | Age: 26
End: 2021-10-25
Payer: COMMERCIAL

## 2021-10-25 VITALS
OXYGEN SATURATION: 100 % | BODY MASS INDEX: 16.75 KG/M2 | RESPIRATION RATE: 11 BRPM | TEMPERATURE: 97 F | DIASTOLIC BLOOD PRESSURE: 80 MMHG | HEART RATE: 74 BPM | WEIGHT: 130.5 LBS | HEIGHT: 74 IN | SYSTOLIC BLOOD PRESSURE: 114 MMHG

## 2021-10-25 DIAGNOSIS — Z87.09 HISTORY OF PNEUMOTHORAX: ICD-10-CM

## 2021-10-25 PROCEDURE — 25000003 PHARM REV CODE 250: Performed by: THORACIC SURGERY (CARDIOTHORACIC VASCULAR SURGERY)

## 2021-10-25 PROCEDURE — 25000003 PHARM REV CODE 250: Performed by: STUDENT IN AN ORGANIZED HEALTH CARE EDUCATION/TRAINING PROGRAM

## 2021-10-25 PROCEDURE — 36000706: Performed by: THORACIC SURGERY (CARDIOTHORACIC VASCULAR SURGERY)

## 2021-10-25 PROCEDURE — 71000016 HC POSTOP RECOV ADDL HR: Performed by: THORACIC SURGERY (CARDIOTHORACIC VASCULAR SURGERY)

## 2021-10-25 PROCEDURE — D9220A PRA ANESTHESIA: Mod: ,,, | Performed by: STUDENT IN AN ORGANIZED HEALTH CARE EDUCATION/TRAINING PROGRAM

## 2021-10-25 PROCEDURE — 63600175 PHARM REV CODE 636 W HCPCS: Performed by: STUDENT IN AN ORGANIZED HEALTH CARE EDUCATION/TRAINING PROGRAM

## 2021-10-25 PROCEDURE — 88300 PR  SURG PATH,GROSS,LEVEL I: ICD-10-PCS | Mod: 26,,, | Performed by: PATHOLOGY

## 2021-10-25 PROCEDURE — D9220A PRA ANESTHESIA: Mod: ,,, | Performed by: ANESTHESIOLOGY

## 2021-10-25 PROCEDURE — 31648 BRONCHIAL VALVE REMOV INIT: CPT | Mod: ,,, | Performed by: THORACIC SURGERY (CARDIOTHORACIC VASCULAR SURGERY)

## 2021-10-25 PROCEDURE — D9220A PRA ANESTHESIA: ICD-10-PCS | Mod: ,,, | Performed by: ANESTHESIOLOGY

## 2021-10-25 PROCEDURE — 88300 SURGICAL PATH GROSS: CPT | Mod: 26,,, | Performed by: PATHOLOGY

## 2021-10-25 PROCEDURE — 71000044 HC DOSC ROUTINE RECOVERY FIRST HOUR: Performed by: THORACIC SURGERY (CARDIOTHORACIC VASCULAR SURGERY)

## 2021-10-25 PROCEDURE — 31648 PR BRONCHOSCOPY W REMOVE BRONCHIAL VALVE, INITIAL: ICD-10-PCS | Mod: ,,, | Performed by: THORACIC SURGERY (CARDIOTHORACIC VASCULAR SURGERY)

## 2021-10-25 PROCEDURE — 36000707: Performed by: THORACIC SURGERY (CARDIOTHORACIC VASCULAR SURGERY)

## 2021-10-25 PROCEDURE — 37000008 HC ANESTHESIA 1ST 15 MINUTES: Performed by: THORACIC SURGERY (CARDIOTHORACIC VASCULAR SURGERY)

## 2021-10-25 PROCEDURE — 27201423 OPTIME MED/SURG SUP & DEVICES STERILE SUPPLY: Performed by: THORACIC SURGERY (CARDIOTHORACIC VASCULAR SURGERY)

## 2021-10-25 PROCEDURE — 37000009 HC ANESTHESIA EA ADD 15 MINS: Performed by: THORACIC SURGERY (CARDIOTHORACIC VASCULAR SURGERY)

## 2021-10-25 PROCEDURE — D9220A PRA ANESTHESIA: ICD-10-PCS | Mod: ,,, | Performed by: STUDENT IN AN ORGANIZED HEALTH CARE EDUCATION/TRAINING PROGRAM

## 2021-10-25 PROCEDURE — 71000015 HC POSTOP RECOV 1ST HR: Performed by: THORACIC SURGERY (CARDIOTHORACIC VASCULAR SURGERY)

## 2021-10-25 PROCEDURE — 88300 SURGICAL PATH GROSS: CPT | Performed by: PATHOLOGY

## 2021-10-25 RX ORDER — DEXAMETHASONE SODIUM PHOSPHATE 4 MG/ML
INJECTION, SOLUTION INTRA-ARTICULAR; INTRALESIONAL; INTRAMUSCULAR; INTRAVENOUS; SOFT TISSUE
Status: DISCONTINUED | OUTPATIENT
Start: 2021-10-25 | End: 2021-10-25

## 2021-10-25 RX ORDER — MIDAZOLAM HYDROCHLORIDE 1 MG/ML
INJECTION INTRAMUSCULAR; INTRAVENOUS
Status: DISCONTINUED | OUTPATIENT
Start: 2021-10-25 | End: 2021-10-25

## 2021-10-25 RX ORDER — LIDOCAINE HYDROCHLORIDE AND EPINEPHRINE 10; 10 MG/ML; UG/ML
INJECTION, SOLUTION INFILTRATION; PERINEURAL
Status: DISCONTINUED | OUTPATIENT
Start: 2021-10-25 | End: 2021-10-25 | Stop reason: HOSPADM

## 2021-10-25 RX ORDER — FENTANYL CITRATE 50 UG/ML
INJECTION, SOLUTION INTRAMUSCULAR; INTRAVENOUS
Status: DISCONTINUED | OUTPATIENT
Start: 2021-10-25 | End: 2021-10-25

## 2021-10-25 RX ORDER — HALOPERIDOL 5 MG/ML
0.5 INJECTION INTRAMUSCULAR EVERY 10 MIN PRN
Status: DISCONTINUED | OUTPATIENT
Start: 2021-10-25 | End: 2021-10-25 | Stop reason: HOSPADM

## 2021-10-25 RX ORDER — PROPOFOL 10 MG/ML
VIAL (ML) INTRAVENOUS
Status: DISCONTINUED | OUTPATIENT
Start: 2021-10-25 | End: 2021-10-25

## 2021-10-25 RX ORDER — SODIUM CHLORIDE 0.9 % (FLUSH) 0.9 %
10 SYRINGE (ML) INJECTION
Status: DISCONTINUED | OUTPATIENT
Start: 2021-10-25 | End: 2021-10-25 | Stop reason: HOSPADM

## 2021-10-25 RX ORDER — FENTANYL CITRATE 50 UG/ML
25 INJECTION, SOLUTION INTRAMUSCULAR; INTRAVENOUS EVERY 5 MIN PRN
Status: DISCONTINUED | OUTPATIENT
Start: 2021-10-25 | End: 2021-10-25 | Stop reason: HOSPADM

## 2021-10-25 RX ORDER — SODIUM CHLORIDE 9 MG/ML
INJECTION, SOLUTION INTRAVENOUS CONTINUOUS PRN
Status: DISCONTINUED | OUTPATIENT
Start: 2021-10-25 | End: 2021-10-25

## 2021-10-25 RX ORDER — PROPOFOL 10 MG/ML
VIAL (ML) INTRAVENOUS CONTINUOUS PRN
Status: DISCONTINUED | OUTPATIENT
Start: 2021-10-25 | End: 2021-10-25

## 2021-10-25 RX ORDER — HYDROMORPHONE HYDROCHLORIDE 1 MG/ML
0.2 INJECTION, SOLUTION INTRAMUSCULAR; INTRAVENOUS; SUBCUTANEOUS EVERY 5 MIN PRN
Status: DISCONTINUED | OUTPATIENT
Start: 2021-10-25 | End: 2021-10-25 | Stop reason: HOSPADM

## 2021-10-25 RX ORDER — PHENYLEPHRINE HYDROCHLORIDE 10 MG/ML
INJECTION INTRAVENOUS
Status: DISCONTINUED | OUTPATIENT
Start: 2021-10-25 | End: 2021-10-25

## 2021-10-25 RX ORDER — LIDOCAINE HYDROCHLORIDE 20 MG/ML
INJECTION INTRAVENOUS
Status: DISCONTINUED | OUTPATIENT
Start: 2021-10-25 | End: 2021-10-25

## 2021-10-25 RX ORDER — ONDANSETRON 2 MG/ML
INJECTION INTRAMUSCULAR; INTRAVENOUS
Status: DISCONTINUED | OUTPATIENT
Start: 2021-10-25 | End: 2021-10-25

## 2021-10-25 RX ADMIN — SODIUM CHLORIDE: 0.9 INJECTION, SOLUTION INTRAVENOUS at 09:10

## 2021-10-25 RX ADMIN — PHENYLEPHRINE HYDROCHLORIDE 50 MCG: 10 INJECTION INTRAVENOUS at 10:10

## 2021-10-25 RX ADMIN — PROPOFOL 100 MG: 10 INJECTION, EMULSION INTRAVENOUS at 09:10

## 2021-10-25 RX ADMIN — DEXAMETHASONE SODIUM PHOSPHATE 4 MG: 4 INJECTION, SOLUTION INTRAMUSCULAR; INTRAVENOUS at 09:10

## 2021-10-25 RX ADMIN — MIDAZOLAM HYDROCHLORIDE 2 MG: 1 INJECTION, SOLUTION INTRAMUSCULAR; INTRAVENOUS at 09:10

## 2021-10-25 RX ADMIN — PROPOFOL 200 MCG/KG/MIN: 10 INJECTION, EMULSION INTRAVENOUS at 09:10

## 2021-10-25 RX ADMIN — ONDANSETRON 4 MG: 2 INJECTION INTRAMUSCULAR; INTRAVENOUS at 10:10

## 2021-10-25 RX ADMIN — FENTANYL CITRATE 100 MCG: 50 INJECTION, SOLUTION INTRAMUSCULAR; INTRAVENOUS at 09:10

## 2021-10-25 RX ADMIN — LIDOCAINE HYDROCHLORIDE 40 MG: 20 INJECTION, SOLUTION INTRAVENOUS at 09:10

## 2021-10-27 NOTE — ED NOTES
Appearance: Pt awake, alert & oriented to person, place & time. Pt in no acute distress at present time.   Skin: Skin warm, dry & intact. Mucous membranes moist. Skin turgor normal.   Respiratory: Respirations even, non-labored.   Neurologic: Pt moving all extremities without difficulty. Sensation intact.   Peripheral Vascular: All peripheral pulses present.       Pectoralis Procedure Note    Pre-Procedure   Staff -        Anesthesiologist:  Bart Mayen MD       Resident/Fellow: Criselda Lawson MD       Performed By: resident       Location: pre-op       Procedure Start/Stop Times: 10/27/2021 10:00 AM and 10/27/2021 10:10 AM       Pre-Anesthestic Checklist: patient identified, IV checked, site marked, risks and benefits discussed, informed consent, monitors and equipment checked, pre-op evaluation, at physician/surgeon's request and post-op pain management  Timeout:       Correct Patient: Yes        Correct Procedure: Yes        Correct Site: Yes        Correct Position: Yes        Correct Laterality: Yes        Site Marked: Yes  Procedure Documentation  Procedure: Pectoralis       Laterality: right       Patient Position: sitting       Skin prep: Chloraprep       Needle Type: short bevel       Needle Gauge: 17.        Needle Length (millimeters): 110        Ultrasound guided       1. Ultrasound was used to identify targeted nerve, plexus, vascular marker, or fascial plane and place a needle adjacent to it in real-time.       2. Ultrasound was used to visualize the spread of anesthetic in close proximity to the above referenced structure.       3. A permanent image is entered into the patient's record.       4. The visualized anatomic structures appeared normal.       5. There were no apparent abnormal pathologic findings.    Assessment/Narrative         The placement was negative for: blood aspirated, painful injection and site bleeding       Paresthesias: No.     Bolus given via needle..        Secured via.        Insertion/Infusion Method: Single Shot       Complications: none    Medication(s) Administered   Bupivacaine 0.25% PF (Infiltration), 30 mL  Dexamethasone 10 mg/mL PF (Perineural), 1 mg  Dexmedetomidine 4 mcg/mL (Perineural), 20 mcg  Medication Administration Time: 10/27/2021 10:10 AM     Comments:  Right Pectoralis 1 and 2 block

## 2021-11-10 LAB
FINAL PATHOLOGIC DIAGNOSIS: NORMAL
GROSS: NORMAL
Lab: NORMAL

## 2021-12-08 ENCOUNTER — PATIENT OUTREACH (OUTPATIENT)
Dept: ADMINISTRATIVE | Facility: OTHER | Age: 26
End: 2021-12-08
Payer: COMMERCIAL

## 2021-12-09 ENCOUNTER — OFFICE VISIT (OUTPATIENT)
Dept: PULMONOLOGY | Facility: CLINIC | Age: 26
End: 2021-12-09
Payer: COMMERCIAL

## 2021-12-09 VITALS
WEIGHT: 126.56 LBS | HEART RATE: 97 BPM | SYSTOLIC BLOOD PRESSURE: 110 MMHG | BODY MASS INDEX: 16.24 KG/M2 | DIASTOLIC BLOOD PRESSURE: 76 MMHG | OXYGEN SATURATION: 98 % | HEIGHT: 74 IN

## 2021-12-09 DIAGNOSIS — J43.9 LUNG BLEBS: ICD-10-CM

## 2021-12-09 DIAGNOSIS — J45.909 ASTHMA, UNSPECIFIED ASTHMA SEVERITY, UNSPECIFIED WHETHER COMPLICATED, UNSPECIFIED WHETHER PERSISTENT: Primary | ICD-10-CM

## 2021-12-09 DIAGNOSIS — Z01.818 PRE-OP TESTING: ICD-10-CM

## 2021-12-09 DIAGNOSIS — J93.9 RECURRENT PNEUMOTHORAX: ICD-10-CM

## 2021-12-09 DIAGNOSIS — J45.20 MILD INTERMITTENT ASTHMA WITHOUT COMPLICATION: ICD-10-CM

## 2021-12-09 PROCEDURE — 99999 PR PBB SHADOW E&M-EST. PATIENT-LVL III: CPT | Mod: PBBFAC,,, | Performed by: INTERNAL MEDICINE

## 2021-12-09 PROCEDURE — 99214 PR OFFICE/OUTPT VISIT, EST, LEVL IV, 30-39 MIN: ICD-10-PCS | Mod: S$GLB,,, | Performed by: INTERNAL MEDICINE

## 2021-12-09 PROCEDURE — 99999 PR PBB SHADOW E&M-EST. PATIENT-LVL III: ICD-10-PCS | Mod: PBBFAC,,, | Performed by: INTERNAL MEDICINE

## 2021-12-09 PROCEDURE — 99214 OFFICE O/P EST MOD 30 MIN: CPT | Mod: S$GLB,,, | Performed by: INTERNAL MEDICINE

## 2021-12-15 ENCOUNTER — HOSPITAL ENCOUNTER (OUTPATIENT)
Dept: PULMONOLOGY | Facility: CLINIC | Age: 26
Discharge: HOME | End: 2021-12-15
Payer: COMMERCIAL

## 2021-12-15 DIAGNOSIS — J45.909 ASTHMA, UNSPECIFIED ASTHMA SEVERITY, UNSPECIFIED WHETHER COMPLICATED, UNSPECIFIED WHETHER PERSISTENT: ICD-10-CM

## 2021-12-15 DIAGNOSIS — Z13.9 SCREENING PROCEDURE: Primary | ICD-10-CM

## 2021-12-15 LAB
DLCO ADJ PRE: 35.26 ML/(MIN*MMHG)
DLCO SINGLE BREATH LLN: 31.51
DLCO SINGLE BREATH PRE REF: 91.7 %
DLCO SINGLE BREATH REF: 38.44
DLCOC SBVA LLN: 3.82
DLCOC SBVA PRE REF: 112.2 %
DLCOC SBVA REF: 4.99
DLCOC SINGLE BREATH LLN: 31.51
DLCOC SINGLE BREATH PRE REF: 91.7 %
DLCOC SINGLE BREATH REF: 38.44
DLCOCSBVAULN: 6.16
DLCOCSINGLEBREATHULN: 45.37
DLCOSINGLEBREATHULN: 45.37
DLCOVA LLN: 3.82
DLCOVA PRE REF: 112.2 %
DLCOVA PRE: 5.6 ML/(MIN*MMHG*L)
DLCOVA REF: 4.99
DLCOVAULN: 6.16
DLVAADJ PRE: 5.6 ML/(MIN*MMHG*L)
FEF 25 75 LLN: 2.67
FEF 25 75 PRE REF: 120.2 %
FEF 25 75 REF: 4.57
FEV05 LLN: 2.1
FEV05 REF: 3.53
FEV1 FVC LLN: 73
FEV1 FVC PRE REF: 114.8 %
FEV1 FVC REF: 85
FEV1 LLN: 3.4
FEV1 PRE REF: 91.8 %
FEV1 REF: 4.33
FVC LLN: 4.09
FVC PRE REF: 79.4 %
FVC REF: 5.16
IVC PRE: 3.62 L
IVC SINGLE BREATH LLN: 4.09
IVC SINGLE BREATH PRE REF: 70.3 %
IVC SINGLE BREATH REF: 5.16
IVCSINGLEBREATHULN: 6.23
PEF LLN: 7.73
PEF PRE REF: 67.1 %
PEF REF: 10.6
PHYSICIAN COMMENT: NORMAL
PRE DLCO: 35.26 ML/(MIN*MMHG)
PRE FEF 25 75: 5.49 L/S
PRE FET 100: 4.33 SEC
PRE FEV05 REF: 84.8 %
PRE FEV1 FVC: 97.14 %
PRE FEV1: 3.97 L
PRE FEV5: 2.99 L
PRE FVC: 4.09 L
PRE PEF: 7.11 L/S
SARS-COV-2 RDRP RESP QL NAA+PROBE: NEGATIVE
VA PRE: 6.3 L
VA SINGLE BREATH PRE REF: 83.4 %
VA SINGLE BREATH REF: 7.55

## 2021-12-15 PROCEDURE — 94729 DIFFUSING CAPACITY: CPT | Mod: S$GLB,,, | Performed by: INTERNAL MEDICINE

## 2021-12-15 PROCEDURE — 94010 BREATHING CAPACITY TEST: ICD-10-PCS | Mod: S$GLB,,, | Performed by: INTERNAL MEDICINE

## 2021-12-15 PROCEDURE — U0002 COVID-19 LAB TEST NON-CDC: HCPCS | Performed by: INTERNAL MEDICINE

## 2021-12-15 PROCEDURE — 94010 BREATHING CAPACITY TEST: CPT | Mod: S$GLB,,, | Performed by: INTERNAL MEDICINE

## 2021-12-15 PROCEDURE — 94729 PR C02/MEMBANE DIFFUSE CAPACITY: ICD-10-PCS | Mod: S$GLB,,, | Performed by: INTERNAL MEDICINE

## 2021-12-22 ENCOUNTER — HOSPITAL ENCOUNTER (OUTPATIENT)
Dept: RADIOLOGY | Facility: HOSPITAL | Age: 26
Discharge: HOME | End: 2021-12-22
Attending: INTERNAL MEDICINE
Payer: COMMERCIAL

## 2021-12-22 DIAGNOSIS — J93.9 RECURRENT PNEUMOTHORAX: ICD-10-CM

## 2021-12-22 PROCEDURE — 71250 CT THORAX DX C-: CPT | Mod: 26,,, | Performed by: RADIOLOGY

## 2021-12-22 PROCEDURE — 71250 CT THORAX DX C-: CPT | Mod: TC

## 2021-12-22 PROCEDURE — 71250 CT CHEST WITHOUT CONTRAST: ICD-10-PCS | Mod: 26,,, | Performed by: RADIOLOGY

## 2022-01-27 NOTE — PATIENT INSTRUCTIONS
Addended by: Vicente Navarrete on: 1/27/2022 02:48 PM     Modules accepted: Orders Start taking your Nexium twice daily, 30 minutes before breakfast and 30 minutes before dinner    For GERD:    Take your PPI 30-45 minutes before your first protein containing meal (breakfast) every day.    Remain upright for at least 3 hours after eating.    Elevate the head of the bead about 6 inches.  Some patients place cinder blocks under the head of the bed for elevation.    Avoid foods that you have noticed make your symptoms worse.    Set a weight loss goal of 10% of your body weight. (For example, if you weigh 150 lbs, your goal should be to loose 15 lbs).    Http://www.refluxcookbook.com/  Dropping Acid The Reflux Diet Cookbook and Samuel -  Tyson Carter M.D.    GERD  Worst Foods for Acid Reflux  Chocolate (milk chocolate worse than dark chocolate)  Soda (all carbonated beverages)  Alcohol (beer, liquor, wine)  Fried foods  Pitts, sausage, ribs  Cream sauce  Fatty meats (beef)  Butter, margarine, lard, shortening  Coffee, tea  Mint   High fat nuts  Hot sauces and pepper  Citrus fruit/juices      Acidic foods (pH - 1 is MORE acidic, 5 is LESS acidic)     Do not eat or drink these (lower numbers are worse)    Induction diet - For 2 weeks eat nothing below pH 5     Lemon juice 2.3  Grape cranberry juice 2.5  Stomach Acid 2.5  Gelatin Dessert 2.6  Lemon/lime 2.9/2.7  Vinegar 2.9  Gatorade 3.0  Fruits - plums, apricots, strawberries, cherries 3.0  Vitamin C (ascorbic acid) 3.0  Iced tea, Snapple 3.1  Mustard 3.2  Soft drinks 3.3  Nectarines 3.3  Pomegranate 3.3  Applesauce 3.4  Grapefruit 3.4  Kiwi 3.4  Barbecue sauce 3.4  Caesar dressing 3.5  [place] dressing 3.6  Strawberries 3.5  Pineapple juice 3.5  Beer 3.5  Wine 3.5  Grape 3.6  Apples 3.6  Pineapple 3.7  Pickle 3.7  Blackberries, blueberries 3.7  Demetri 3.7  Orange 3.8  Cherries 3.9  Red Bull 3.9  Tomatoes 4.2  Coffee 5.1      These are Safe foods:  Agave  Aloe Vera  Apple (only red)  Bagels  Banana (worsens reflux in 1%)  Beans - black, red, lima,  lentils  Bread - whole grain, rye  Caramel  Celery  Chamomile tea  Chicken - skinless, never fried  Chicken stock or bouillon  Coffee - one cup/day with milk  Fennel  Fish  Genesis  Green vegetables (no green peppers)  Herbs  Honey  Melon  Milk - skin, soy, or Lactaid skim milk  Mushrooms  Oatmeal  Olive oil  Parsley  Pasta  Pears  Popcorn  Potatoes  Red bell peppers  Rice  Soups  Tofu  Turkey Breast  Turnip  Vegetables - no onion, tomatoes, peppers  Vinaigrette  Water - non carbonated  Whole grain breads, crackers, breakfast cereals      Best Foods for Acid Reflux  Whole grain breads  Oatmeal  Aloe Vera  Salad (no tomatoes, onions, cheese, or high fat dressing)  Banana  Melon  Fennel  Chicken and turkey (skinless, never fried)  Fish/seafood (never fried)  Celery  Parsley  Couscous and Rice    Maybe bad foods (Everyone is unique)  Tomatoes  Garlic  Onion  Nuts (macadamia nuts)  Apples (especially green)  Cucumber  Green peppers  Spicy food  Some herbal teas    GERD tips  Change what you eat:  Eat smaller meals  Eat slowly and chew thoroughly until food is almost liquid  Cut down on junk carbohydrates such as sugar and white flour  Use herbs in your cooking  Eat more raw foods (more than 10 ingredients is not a raw food)  Avoid trans fats and partially hydrogenated oils  Eat more fish and switch to grass fed beef  Switch your cooking oil to macadamia nut or olive oil  Watch extremes of salt intake (too high or too low is bad)    If just cutting out acidic foods is not enough, change how you eat:  Large breakfast, medium lunch, light dinner  Dont mix fruit juices, sweet fruits, and refined starches with meats and heavy food  Dont wash your food down with a lot of liquid    List A Proteins - meat, poultry, cheese, eggs, fish, beans, yogurt    List B Neutral - vegetables, salads, seeds, nuts, herbs, cream, butter, olive oil    List C Starches - biscuit, breads, cake, crackers, oats, pasta, potatoes, rice, sugar/honey,  sweets    A + B = ok  B + C = ok  Never mix list A and C!!    Change these habits:  Stop smoking  Eat dinner earlier (3-4 hours before lying down to sleep)  Elevate the head of your bed 6 inches (blocks under the head of the bed are better than pillows)  Exercise (but wait 2 hours after eating)  Drink more water (between meals)    Take these supplements:  Multi vitamin  Probiotic  Fish oil    Most common food allergens: milk, eggs, peanuts, tree nuts, fish, shellfish, wheat, and soy    All natural immediate relief:  Chew 2-3 soft probiotic capsules - Dr. Vazquez's Probiotics 12 Plus  Chew chewable DGL licorice tablet  Chew papaya tablet with high protein meal - [employer]  Drink 2 ounces of aloe vera juice  Swedish bitters  Prelief- reduce the acid in food to keep it form burning sensitive tissue  Iberogast  [place]  Drink Chamomile Tea  Teaspoon of baking soda in water  Spoonful of vinegar in water      All natural ulcer healers:  Zinc carnosine - 75.5 mg with food twice a day x 8 weeks   Adolph Varela - $8 for 60 pills  DGL (deglycyrrhizinated licorice) - 2 tablets before meals. Heals stomach lining   Natural Factors brand, Enzymatic therapy brand.  Aloe Vera juice  - 2 to 8 ounces a day   [place] or [place]

## 2022-03-09 ENCOUNTER — OFFICE VISIT (OUTPATIENT)
Dept: INTERNAL MEDICINE | Facility: CLINIC | Age: 27
End: 2022-03-09
Payer: COMMERCIAL

## 2022-03-09 ENCOUNTER — LAB VISIT (OUTPATIENT)
Dept: LAB | Facility: HOSPITAL | Age: 27
End: 2022-03-09
Attending: INTERNAL MEDICINE
Payer: COMMERCIAL

## 2022-03-09 VITALS
BODY MASS INDEX: 16.92 KG/M2 | WEIGHT: 131.81 LBS | OXYGEN SATURATION: 100 % | HEIGHT: 74 IN | HEART RATE: 63 BPM | DIASTOLIC BLOOD PRESSURE: 70 MMHG | SYSTOLIC BLOOD PRESSURE: 116 MMHG

## 2022-03-09 DIAGNOSIS — Z87.09 HX OF PNEUMOTHORAX: ICD-10-CM

## 2022-03-09 DIAGNOSIS — Z00.00 ANNUAL PHYSICAL EXAM: Primary | ICD-10-CM

## 2022-03-09 DIAGNOSIS — K21.9 GASTROESOPHAGEAL REFLUX DISEASE, UNSPECIFIED WHETHER ESOPHAGITIS PRESENT: ICD-10-CM

## 2022-03-09 DIAGNOSIS — J45.20 MILD INTERMITTENT ASTHMA WITHOUT COMPLICATION: ICD-10-CM

## 2022-03-09 DIAGNOSIS — Z00.00 ANNUAL PHYSICAL EXAM: ICD-10-CM

## 2022-03-09 LAB
ALBUMIN SERPL BCP-MCNC: 4.5 G/DL (ref 3.5–5.2)
ALP SERPL-CCNC: 185 U/L (ref 55–135)
ALT SERPL W/O P-5'-P-CCNC: 21 U/L (ref 10–44)
ANION GAP SERPL CALC-SCNC: 12 MMOL/L (ref 8–16)
AST SERPL-CCNC: 21 U/L (ref 10–40)
BASOPHILS # BLD AUTO: 0.07 K/UL (ref 0–0.2)
BASOPHILS NFR BLD: 1.2 % (ref 0–1.9)
BILIRUB SERPL-MCNC: 0.3 MG/DL (ref 0.1–1)
BUN SERPL-MCNC: 4 MG/DL (ref 6–20)
CALCIUM SERPL-MCNC: 7.4 MG/DL (ref 8.7–10.5)
CHLORIDE SERPL-SCNC: 103 MMOL/L (ref 95–110)
CHOLEST SERPL-MCNC: 143 MG/DL (ref 120–199)
CHOLEST/HDLC SERPL: 3 {RATIO} (ref 2–5)
CO2 SERPL-SCNC: 26 MMOL/L (ref 23–29)
CREAT SERPL-MCNC: 0.7 MG/DL (ref 0.5–1.4)
DIFFERENTIAL METHOD: ABNORMAL
EOSINOPHIL # BLD AUTO: 0.2 K/UL (ref 0–0.5)
EOSINOPHIL NFR BLD: 3.6 % (ref 0–8)
ERYTHROCYTE [DISTWIDTH] IN BLOOD BY AUTOMATED COUNT: 14 % (ref 11.5–14.5)
EST. GFR  (AFRICAN AMERICAN): >60 ML/MIN/1.73 M^2
EST. GFR  (NON AFRICAN AMERICAN): >60 ML/MIN/1.73 M^2
GLUCOSE SERPL-MCNC: 86 MG/DL (ref 70–110)
HCT VFR BLD AUTO: 49.2 % (ref 40–54)
HDLC SERPL-MCNC: 48 MG/DL (ref 40–75)
HDLC SERPL: 33.6 % (ref 20–50)
HGB BLD-MCNC: 15.2 G/DL (ref 14–18)
IMM GRANULOCYTES # BLD AUTO: 0 K/UL (ref 0–0.04)
IMM GRANULOCYTES NFR BLD AUTO: 0 % (ref 0–0.5)
LDLC SERPL CALC-MCNC: 81 MG/DL (ref 63–159)
LYMPHOCYTES # BLD AUTO: 2 K/UL (ref 1–4.8)
LYMPHOCYTES NFR BLD: 34 % (ref 18–48)
MCH RBC QN AUTO: 28.3 PG (ref 27–31)
MCHC RBC AUTO-ENTMCNC: 30.9 G/DL (ref 32–36)
MCV RBC AUTO: 91 FL (ref 82–98)
MONOCYTES # BLD AUTO: 0.7 K/UL (ref 0.3–1)
MONOCYTES NFR BLD: 12.2 % (ref 4–15)
NEUTROPHILS # BLD AUTO: 2.9 K/UL (ref 1.8–7.7)
NEUTROPHILS NFR BLD: 49 % (ref 38–73)
NONHDLC SERPL-MCNC: 95 MG/DL
NRBC BLD-RTO: 0 /100 WBC
PLATELET # BLD AUTO: 233 K/UL (ref 150–450)
PMV BLD AUTO: 11.2 FL (ref 9.2–12.9)
POTASSIUM SERPL-SCNC: 4.3 MMOL/L (ref 3.5–5.1)
PROT SERPL-MCNC: 7.8 G/DL (ref 6–8.4)
RBC # BLD AUTO: 5.38 M/UL (ref 4.6–6.2)
SODIUM SERPL-SCNC: 141 MMOL/L (ref 136–145)
TRIGL SERPL-MCNC: 70 MG/DL (ref 30–150)
TSH SERPL DL<=0.005 MIU/L-ACNC: 3.23 UIU/ML (ref 0.4–4)
WBC # BLD AUTO: 5.91 K/UL (ref 3.9–12.7)

## 2022-03-09 PROCEDURE — 84443 ASSAY THYROID STIM HORMONE: CPT | Performed by: INTERNAL MEDICINE

## 2022-03-09 PROCEDURE — 1159F MED LIST DOCD IN RCRD: CPT | Mod: CPTII,S$GLB,, | Performed by: INTERNAL MEDICINE

## 2022-03-09 PROCEDURE — 3008F PR BODY MASS INDEX (BMI) DOCUMENTED: ICD-10-PCS | Mod: CPTII,S$GLB,, | Performed by: INTERNAL MEDICINE

## 2022-03-09 PROCEDURE — 1160F PR REVIEW ALL MEDS BY PRESCRIBER/CLIN PHARMACIST DOCUMENTED: ICD-10-PCS | Mod: CPTII,S$GLB,, | Performed by: INTERNAL MEDICINE

## 2022-03-09 PROCEDURE — 1160F RVW MEDS BY RX/DR IN RCRD: CPT | Mod: CPTII,S$GLB,, | Performed by: INTERNAL MEDICINE

## 2022-03-09 PROCEDURE — 80061 LIPID PANEL: CPT | Performed by: INTERNAL MEDICINE

## 2022-03-09 PROCEDURE — 3078F PR MOST RECENT DIASTOLIC BLOOD PRESSURE < 80 MM HG: ICD-10-PCS | Mod: CPTII,S$GLB,, | Performed by: INTERNAL MEDICINE

## 2022-03-09 PROCEDURE — 99999 PR PBB SHADOW E&M-EST. PATIENT-LVL III: CPT | Mod: PBBFAC,,, | Performed by: INTERNAL MEDICINE

## 2022-03-09 PROCEDURE — 3074F SYST BP LT 130 MM HG: CPT | Mod: CPTII,S$GLB,, | Performed by: INTERNAL MEDICINE

## 2022-03-09 PROCEDURE — 3008F BODY MASS INDEX DOCD: CPT | Mod: CPTII,S$GLB,, | Performed by: INTERNAL MEDICINE

## 2022-03-09 PROCEDURE — 1159F PR MEDICATION LIST DOCUMENTED IN MEDICAL RECORD: ICD-10-PCS | Mod: CPTII,S$GLB,, | Performed by: INTERNAL MEDICINE

## 2022-03-09 PROCEDURE — 80053 COMPREHEN METABOLIC PANEL: CPT | Performed by: INTERNAL MEDICINE

## 2022-03-09 PROCEDURE — 3078F DIAST BP <80 MM HG: CPT | Mod: CPTII,S$GLB,, | Performed by: INTERNAL MEDICINE

## 2022-03-09 PROCEDURE — 36415 COLL VENOUS BLD VENIPUNCTURE: CPT | Performed by: INTERNAL MEDICINE

## 2022-03-09 PROCEDURE — 99999 PR PBB SHADOW E&M-EST. PATIENT-LVL III: ICD-10-PCS | Mod: PBBFAC,,, | Performed by: INTERNAL MEDICINE

## 2022-03-09 PROCEDURE — 99395 PREV VISIT EST AGE 18-39: CPT | Mod: S$GLB,,, | Performed by: INTERNAL MEDICINE

## 2022-03-09 PROCEDURE — 3074F PR MOST RECENT SYSTOLIC BLOOD PRESSURE < 130 MM HG: ICD-10-PCS | Mod: CPTII,S$GLB,, | Performed by: INTERNAL MEDICINE

## 2022-03-09 PROCEDURE — 85025 COMPLETE CBC W/AUTO DIFF WBC: CPT | Performed by: INTERNAL MEDICINE

## 2022-03-09 PROCEDURE — 99395 PR PREVENTIVE VISIT,EST,18-39: ICD-10-PCS | Mod: S$GLB,,, | Performed by: INTERNAL MEDICINE

## 2022-03-09 NOTE — PROGRESS NOTES
"Subjective:       Patient ID: Darci Vaughan Jr. is a 26 y.o. male.    Chief Complaint: Annual Exam  This is a 26-year-old who presents today for physical.  Patient reports that he has been feeling well overall he has had no further episodes of pneumothorax and did follow-up with CT surgery for repeat bronchoscopy and valve removal he also went to see Pulmonary and currently remains on albuterol just when needed for any asthma symptoms he has been asymptomatic recently rare use of his inhaler.  His weight has been stable while low since last visit he is a vegetarian and we discussed increased dietary protein.  He stays active at work and also tries to do a bit of exercise or walking at home.  Denies anxiety or depression currently    HPI  Review of Systems   Constitutional:        Weigh stable  Vegetarian    Respiratory: Negative for shortness of breath.    Cardiovascular: Negative for chest pain.   Gastrointestinal:        Gi symptoms improved  Has pepcid if needed        Objective:     Blood pressure 116/70, pulse 63, height 6' 2" (1.88 m), weight 59.8 kg (131 lb 13.4 oz), SpO2 100 %.    Physical Exam  Constitutional:       General: He is not in acute distress.  HENT:      Head: Normocephalic.   Eyes:      General: No scleral icterus.  Cardiovascular:      Rate and Rhythm: Normal rate and regular rhythm.      Heart sounds: Normal heart sounds. No murmur heard.    No friction rub. No gallop.   Pulmonary:      Effort: Pulmonary effort is normal. No respiratory distress.      Breath sounds: Normal breath sounds.      Comments: Healed chest tube scar left side   Abdominal:      General: Bowel sounds are normal.      Palpations: Abdomen is soft. There is no mass.      Tenderness: There is no abdominal tenderness.   Musculoskeletal:      Cervical back: Neck supple.   Skin:     Findings: No erythema.   Neurological:      Mental Status: He is alert.         Assessment:       1. Annual physical exam    2. Mild " intermittent asthma without complication    3. Gastroesophageal reflux disease, unspecified whether esophagitis present    4. Hx of pneumothorax        Plan:       Darci was seen today for annual exam.    Diagnoses and all orders for this visit:    Annual physical exam  -     CBC Auto Differential; Future  -     Comprehensive Metabolic Panel; Future  -     Lipid Panel; Future  -     TSH; Future    Mild intermittent asthma without complication  Hx pneumothorax hx lung blebs with recurrant pneumothorax   History of follows with Pulmonary  Had prior chest tube and bowel placement then removal with resolution of his symptoms he has albuterol which he uses on rare occasion as needed    Gastroesophageal reflux disease, unspecified whether esophagitis present  History of uses Pepcid if needed    Update labs and review  We discussed vaccines such as flu shot COVID vaccines recommended he declines    Follow-up annually

## 2022-03-11 ENCOUNTER — TELEPHONE (OUTPATIENT)
Dept: INTERNAL MEDICINE | Facility: CLINIC | Age: 27
End: 2022-03-11
Payer: COMMERCIAL

## 2022-03-14 ENCOUNTER — TELEPHONE (OUTPATIENT)
Dept: INTERNAL MEDICINE | Facility: CLINIC | Age: 27
End: 2022-03-14
Payer: COMMERCIAL

## 2022-03-14 NOTE — TELEPHONE ENCOUNTER
Called and spoke to patient about labs. Also discussed increasing calcium with meds and foods. Pt understood with verbal read back.

## 2022-03-21 ENCOUNTER — HOSPITAL ENCOUNTER (OUTPATIENT)
Dept: RADIOLOGY | Facility: HOSPITAL | Age: 27
Discharge: HOME | End: 2022-03-21
Attending: PHYSICIAN ASSISTANT
Payer: COMMERCIAL

## 2022-03-21 ENCOUNTER — PATIENT MESSAGE (OUTPATIENT)
Dept: CARDIOTHORACIC SURGERY | Facility: CLINIC | Age: 27
End: 2022-03-21
Payer: COMMERCIAL

## 2022-03-21 DIAGNOSIS — J93.9 PNEUMOTHORAX, UNSPECIFIED TYPE: Primary | ICD-10-CM

## 2022-03-21 DIAGNOSIS — J93.9 PNEUMOTHORAX, UNSPECIFIED TYPE: ICD-10-CM

## 2022-03-21 PROCEDURE — 71046 X-RAY EXAM CHEST 2 VIEWS: CPT | Mod: 26,,, | Performed by: RADIOLOGY

## 2022-03-21 PROCEDURE — 71046 X-RAY EXAM CHEST 2 VIEWS: CPT | Mod: TC

## 2022-03-21 PROCEDURE — 71046 XR CHEST PA AND LATERAL: ICD-10-PCS | Mod: 26,,, | Performed by: RADIOLOGY

## 2022-05-19 ENCOUNTER — HOSPITAL ENCOUNTER (OUTPATIENT)
Dept: RADIOLOGY | Facility: HOSPITAL | Age: 27
Discharge: HOME | End: 2022-05-19
Attending: PHYSICIAN ASSISTANT
Payer: COMMERCIAL

## 2022-05-19 ENCOUNTER — OFFICE VISIT (OUTPATIENT)
Dept: INTERNAL MEDICINE | Facility: CLINIC | Age: 27
End: 2022-05-19
Payer: COMMERCIAL

## 2022-05-19 VITALS
BODY MASS INDEX: 15.93 KG/M2 | WEIGHT: 124.13 LBS | SYSTOLIC BLOOD PRESSURE: 116 MMHG | HEART RATE: 106 BPM | OXYGEN SATURATION: 98 % | DIASTOLIC BLOOD PRESSURE: 80 MMHG | RESPIRATION RATE: 16 BRPM | HEIGHT: 74 IN

## 2022-05-19 DIAGNOSIS — Z87.09 HISTORY OF PNEUMOTHORAX: ICD-10-CM

## 2022-05-19 DIAGNOSIS — J45.20 MILD INTERMITTENT ASTHMA WITHOUT COMPLICATION: ICD-10-CM

## 2022-05-19 DIAGNOSIS — R09.81 NASAL CONGESTION: ICD-10-CM

## 2022-05-19 DIAGNOSIS — J45.20 MILD INTERMITTENT ASTHMA WITHOUT COMPLICATION: Primary | ICD-10-CM

## 2022-05-19 LAB
CTP QC/QA: YES
SARS-COV-2 RDRP RESP QL NAA+PROBE: NEGATIVE

## 2022-05-19 PROCEDURE — 3008F BODY MASS INDEX DOCD: CPT | Mod: CPTII,S$GLB,, | Performed by: PHYSICIAN ASSISTANT

## 2022-05-19 PROCEDURE — 1160F RVW MEDS BY RX/DR IN RCRD: CPT | Mod: CPTII,S$GLB,, | Performed by: PHYSICIAN ASSISTANT

## 2022-05-19 PROCEDURE — 3079F DIAST BP 80-89 MM HG: CPT | Mod: CPTII,S$GLB,, | Performed by: PHYSICIAN ASSISTANT

## 2022-05-19 PROCEDURE — 99999 PR PBB SHADOW E&M-EST. PATIENT-LVL IV: ICD-10-PCS | Mod: PBBFAC,,, | Performed by: PHYSICIAN ASSISTANT

## 2022-05-19 PROCEDURE — 99213 PR OFFICE/OUTPT VISIT, EST, LEVL III, 20-29 MIN: ICD-10-PCS | Mod: S$GLB,,, | Performed by: PHYSICIAN ASSISTANT

## 2022-05-19 PROCEDURE — 1159F MED LIST DOCD IN RCRD: CPT | Mod: CPTII,S$GLB,, | Performed by: PHYSICIAN ASSISTANT

## 2022-05-19 PROCEDURE — 71046 X-RAY EXAM CHEST 2 VIEWS: CPT | Mod: TC

## 2022-05-19 PROCEDURE — 1159F PR MEDICATION LIST DOCUMENTED IN MEDICAL RECORD: ICD-10-PCS | Mod: CPTII,S$GLB,, | Performed by: PHYSICIAN ASSISTANT

## 2022-05-19 PROCEDURE — 3074F PR MOST RECENT SYSTOLIC BLOOD PRESSURE < 130 MM HG: ICD-10-PCS | Mod: CPTII,S$GLB,, | Performed by: PHYSICIAN ASSISTANT

## 2022-05-19 PROCEDURE — 3079F PR MOST RECENT DIASTOLIC BLOOD PRESSURE 80-89 MM HG: ICD-10-PCS | Mod: CPTII,S$GLB,, | Performed by: PHYSICIAN ASSISTANT

## 2022-05-19 PROCEDURE — U0002 COVID-19 LAB TEST NON-CDC: HCPCS | Mod: QW,S$GLB,, | Performed by: PHYSICIAN ASSISTANT

## 2022-05-19 PROCEDURE — 71046 XR CHEST PA AND LATERAL: ICD-10-PCS | Mod: 26,,, | Performed by: RADIOLOGY

## 2022-05-19 PROCEDURE — 99999 PR PBB SHADOW E&M-EST. PATIENT-LVL IV: CPT | Mod: PBBFAC,,, | Performed by: PHYSICIAN ASSISTANT

## 2022-05-19 PROCEDURE — 71046 X-RAY EXAM CHEST 2 VIEWS: CPT | Mod: 26,,, | Performed by: RADIOLOGY

## 2022-05-19 PROCEDURE — U0002: ICD-10-PCS | Mod: QW,S$GLB,, | Performed by: PHYSICIAN ASSISTANT

## 2022-05-19 PROCEDURE — 3074F SYST BP LT 130 MM HG: CPT | Mod: CPTII,S$GLB,, | Performed by: PHYSICIAN ASSISTANT

## 2022-05-19 PROCEDURE — 99213 OFFICE O/P EST LOW 20 MIN: CPT | Mod: S$GLB,,, | Performed by: PHYSICIAN ASSISTANT

## 2022-05-19 PROCEDURE — 1160F PR REVIEW ALL MEDS BY PRESCRIBER/CLIN PHARMACIST DOCUMENTED: ICD-10-PCS | Mod: CPTII,S$GLB,, | Performed by: PHYSICIAN ASSISTANT

## 2022-05-19 PROCEDURE — 3008F PR BODY MASS INDEX (BMI) DOCUMENTED: ICD-10-PCS | Mod: CPTII,S$GLB,, | Performed by: PHYSICIAN ASSISTANT

## 2022-05-19 RX ORDER — FLUTICASONE PROPIONATE 50 MCG
1 SPRAY, SUSPENSION (ML) NASAL DAILY
Qty: 16 G | Refills: 0 | Status: SHIPPED | OUTPATIENT
Start: 2022-05-19 | End: 2022-08-24

## 2022-05-19 NOTE — PROGRESS NOTES
"Subjective:       Patient ID: Darci Vaughan Jr. is a 26 y.o. male.    Chief Complaint: Asthma    HPI     Established pt of Anita Rodrigues MD (new to me)      Same day/urgent care visit.     Here with concerns of asthma. He states about 2 night ago he had chest tightness/sob that resolved with albuterol inhaler. Reports he is feeling fine today but does note mild nasal congestion. No cough, fevers, wheezing or chest pain. No sick contacts.       Past Medical History:   Diagnosis Date    Anxiety     Asthma     Atelectasis     Chronic constipation     GERD (gastroesophageal reflux disease)     Spontaneous pneumothorax 10/2012       Social History     Tobacco Use    Smoking status: Passive Smoke Exposure - Never Smoker    Smokeless tobacco: Former User   Substance Use Topics    Alcohol use: No    Drug use: No     Review of patient's allergies indicates:  No Known Allergies    Review of Systems   Constitutional: Negative for chills and fever.   HENT: Positive for nasal congestion. Negative for sore throat.    Respiratory: Positive for chest tightness (asthma related/now resolved). Negative for cough, wheezing and stridor.    Cardiovascular: Negative for chest pain, palpitations and leg swelling.   Gastrointestinal: Negative for nausea and vomiting.   Integumentary:  Negative for rash.   Neurological: Negative for weakness and headaches.         Objective: /80 (BP Location: Left arm, Patient Position: Sitting, BP Method: Large (Manual))   Pulse 106   Resp 16   Ht 6' 2" (1.88 m)   Wt 56.3 kg (124 lb 1.9 oz)   SpO2 98%   BMI 15.94 kg/m²         Physical Exam  Vitals reviewed.   Constitutional:       General: He is not in acute distress.     Appearance: He is well-developed.   HENT:      Head: Normocephalic and atraumatic.      Right Ear: Tympanic membrane, ear canal and external ear normal.      Left Ear: Tympanic membrane, ear canal and external ear normal.      Nose: Mucosal edema and " congestion present.      Right Sinus: No maxillary sinus tenderness or frontal sinus tenderness.      Left Sinus: No maxillary sinus tenderness or frontal sinus tenderness.   Cardiovascular:      Rate and Rhythm: Normal rate and regular rhythm.      Heart sounds: No murmur heard.  Pulmonary:      Effort: Pulmonary effort is normal. No respiratory distress.      Breath sounds: Normal breath sounds. No stridor. No wheezing or rales.   Abdominal:      General: Bowel sounds are normal.      Palpations: Abdomen is soft.      Tenderness: There is no abdominal tenderness.   Skin:     General: Skin is warm and dry.      Findings: No rash.   Neurological:      Mental Status: He is alert.   Psychiatric:         Mood and Affect: Mood is anxious.         Assessment:       Problem List Items Addressed This Visit        Pulmonary    Mild intermittent asthma without complication - Primary    Relevant Orders    X-Ray Chest PA And Lateral (Completed)    POCT COVID-19 Rapid Screening (Completed)    History of pneumothorax    Relevant Orders    X-Ray Chest PA And Lateral (Completed)      Other Visit Diagnoses     Nasal congestion        Relevant Medications    fluticasone propionate (FLONASE) 50 mcg/actuation nasal spray    Other Relevant Orders    X-Ray Chest PA And Lateral (Completed)    POCT COVID-19 Rapid Screening (Completed)          Plan:         Darci was seen today for asthma.    Diagnoses and all orders for this visit:    Mild intermittent asthma without complication  -     X-Ray Chest PA And Lateral; Future  -     POCT COVID-19 Rapid Screening    History of pneumothorax  -     X-Ray Chest PA And Lateral; Future    Nasal congestion  -     X-Ray Chest PA And Lateral; Future  -     POCT COVID-19 Rapid Screening  -     fluticasone propionate (FLONASE) 50 mcg/actuation nasal spray; 1 spray (50 mcg total) by Each Nostril route once daily.        VSS  100% on RA  No resp distress or s/s of acute exacerbation  Advised to continue  albuterol prn, start flonase for congestion  Stat xray in clinic (no acute process, per my prelim interpret , final read same)  Rapid COVID negative  RTC prn  ED precautions discussed    Silvia Salazar PA-C

## 2022-08-19 ENCOUNTER — PATIENT MESSAGE (OUTPATIENT)
Dept: ADMINISTRATIVE | Facility: OTHER | Age: 27
End: 2022-08-19
Payer: COMMERCIAL

## 2022-08-24 ENCOUNTER — TELEPHONE (OUTPATIENT)
Dept: INTERNAL MEDICINE | Facility: CLINIC | Age: 27
End: 2022-08-24
Payer: COMMERCIAL

## 2022-08-24 ENCOUNTER — HOSPITAL ENCOUNTER (OUTPATIENT)
Dept: RADIOLOGY | Facility: HOSPITAL | Age: 27
Discharge: HOME | End: 2022-08-24
Attending: INTERNAL MEDICINE
Payer: COMMERCIAL

## 2022-08-24 DIAGNOSIS — J45.20 MILD INTERMITTENT ASTHMA WITHOUT COMPLICATION: ICD-10-CM

## 2022-08-24 DIAGNOSIS — Z87.09 HISTORY OF PNEUMOTHORAX: ICD-10-CM

## 2022-08-24 PROCEDURE — 71046 X-RAY EXAM CHEST 2 VIEWS: CPT | Mod: TC

## 2022-08-24 PROCEDURE — 71046 XR CHEST PA AND LATERAL: ICD-10-PCS | Mod: 26,,, | Performed by: RADIOLOGY

## 2022-08-24 PROCEDURE — 71046 X-RAY EXAM CHEST 2 VIEWS: CPT | Mod: 26,,, | Performed by: RADIOLOGY

## 2022-08-26 ENCOUNTER — TELEPHONE (OUTPATIENT)
Dept: INTERNAL MEDICINE | Facility: CLINIC | Age: 27
End: 2022-08-26
Payer: COMMERCIAL

## 2022-08-26 NOTE — TELEPHONE ENCOUNTER
----- Message from Laurie Stokes sent at 8/26/2022  2:12 PM CDT -----  Contact: SELF/[phone]  Pt called in regards to having questions about his blood sugar. Call back    Please advise

## 2022-08-26 NOTE — TELEPHONE ENCOUNTER
Pt would like some information on what small portions of meals to eat throughout the day to keep his glucose readings leveled

## 2022-08-26 NOTE — TELEPHONE ENCOUNTER
Called and reviewed with pt  He had not been eating as much with his symtoms  reinfoced smaller meals and avoid meal skipping

## 2022-08-31 ENCOUNTER — PATIENT MESSAGE (OUTPATIENT)
Dept: INTERNAL MEDICINE | Facility: CLINIC | Age: 27
End: 2022-08-31
Payer: COMMERCIAL

## 2022-09-01 DIAGNOSIS — F41.9 ANXIETY: Primary | ICD-10-CM

## 2022-09-01 DIAGNOSIS — F43.23 ADJUSTMENT REACTION WITH ANXIETY AND DEPRESSION: ICD-10-CM

## 2022-09-06 ENCOUNTER — OFFICE VISIT (OUTPATIENT)
Dept: INTERNAL MEDICINE | Facility: CLINIC | Age: 27
End: 2022-09-06
Payer: COMMERCIAL

## 2022-09-06 VITALS
HEART RATE: 98 BPM | DIASTOLIC BLOOD PRESSURE: 74 MMHG | OXYGEN SATURATION: 98 % | BODY MASS INDEX: 16.6 KG/M2 | HEIGHT: 73 IN | WEIGHT: 125.25 LBS | SYSTOLIC BLOOD PRESSURE: 118 MMHG

## 2022-09-06 DIAGNOSIS — R51.9 NONINTRACTABLE HEADACHE, UNSPECIFIED CHRONICITY PATTERN, UNSPECIFIED HEADACHE TYPE: Primary | ICD-10-CM

## 2022-09-06 PROCEDURE — 3074F SYST BP LT 130 MM HG: CPT | Mod: CPTII,S$GLB,, | Performed by: PHYSICIAN ASSISTANT

## 2022-09-06 PROCEDURE — 3078F PR MOST RECENT DIASTOLIC BLOOD PRESSURE < 80 MM HG: ICD-10-PCS | Mod: CPTII,S$GLB,, | Performed by: PHYSICIAN ASSISTANT

## 2022-09-06 PROCEDURE — 1160F PR REVIEW ALL MEDS BY PRESCRIBER/CLIN PHARMACIST DOCUMENTED: ICD-10-PCS | Mod: CPTII,S$GLB,, | Performed by: PHYSICIAN ASSISTANT

## 2022-09-06 PROCEDURE — 3074F PR MOST RECENT SYSTOLIC BLOOD PRESSURE < 130 MM HG: ICD-10-PCS | Mod: CPTII,S$GLB,, | Performed by: PHYSICIAN ASSISTANT

## 2022-09-06 PROCEDURE — 99999 PR PBB SHADOW E&M-EST. PATIENT-LVL III: CPT | Mod: PBBFAC,,, | Performed by: PHYSICIAN ASSISTANT

## 2022-09-06 PROCEDURE — 1159F MED LIST DOCD IN RCRD: CPT | Mod: CPTII,S$GLB,, | Performed by: PHYSICIAN ASSISTANT

## 2022-09-06 PROCEDURE — 1160F RVW MEDS BY RX/DR IN RCRD: CPT | Mod: CPTII,S$GLB,, | Performed by: PHYSICIAN ASSISTANT

## 2022-09-06 PROCEDURE — 1159F PR MEDICATION LIST DOCUMENTED IN MEDICAL RECORD: ICD-10-PCS | Mod: CPTII,S$GLB,, | Performed by: PHYSICIAN ASSISTANT

## 2022-09-06 PROCEDURE — 3008F BODY MASS INDEX DOCD: CPT | Mod: CPTII,S$GLB,, | Performed by: PHYSICIAN ASSISTANT

## 2022-09-06 PROCEDURE — 3078F DIAST BP <80 MM HG: CPT | Mod: CPTII,S$GLB,, | Performed by: PHYSICIAN ASSISTANT

## 2022-09-06 PROCEDURE — 99213 OFFICE O/P EST LOW 20 MIN: CPT | Mod: S$GLB,,, | Performed by: PHYSICIAN ASSISTANT

## 2022-09-06 PROCEDURE — 3008F PR BODY MASS INDEX (BMI) DOCUMENTED: ICD-10-PCS | Mod: CPTII,S$GLB,, | Performed by: PHYSICIAN ASSISTANT

## 2022-09-06 PROCEDURE — 99213 PR OFFICE/OUTPT VISIT, EST, LEVL III, 20-29 MIN: ICD-10-PCS | Mod: S$GLB,,, | Performed by: PHYSICIAN ASSISTANT

## 2022-09-06 PROCEDURE — 99999 PR PBB SHADOW E&M-EST. PATIENT-LVL III: ICD-10-PCS | Mod: PBBFAC,,, | Performed by: PHYSICIAN ASSISTANT

## 2022-09-06 RX ORDER — NAPROXEN 500 MG/1
500 TABLET ORAL 2 TIMES DAILY PRN
Qty: 20 TABLET | Refills: 0 | Status: SHIPPED | OUTPATIENT
Start: 2022-09-06 | End: 2023-09-06 | Stop reason: ALTCHOICE

## 2022-09-06 NOTE — LETTER
September 6, 2022    Darci Vaughan Jr.  [address]  [place] [address]       [facility]  Internal Medicine  [address]  [facility] [address]  Phone: [phone]  Fax: [phone]   September 6, 2022     Patient: Darci Vaughan Jr.   YOB: 1995   Date of Visit: 9/6/2022       To Whom it May Concern:    Darci Vaughan was seen in my clinic on 9/6/2022.     Please excuse him from any classes or work missed.    If you have any questions or concerns, please don't hesitate to call.    Sincerely,           Silvia Salazar PA-C

## 2022-09-06 NOTE — PROGRESS NOTES
"Subjective:       Patient ID: Darci Vaughan Jr. is a 26 y.o. male.    Chief Complaint: Headache    HPI    Established pt of Anita Rodrigues MD     Urgent care appt.       Intermittent headaches for the past 1 week. Varying locations  Aching quality. Left Scalp feels tender where a pole hit his head about 3 weeks ago. Also notes neck tension. Occ photophobia and trouble focusing/concentrating. No fevers, cp, sob, vision changes, vomiting, syncope or loc. No meds or relieving measures tried. Recent lab from 8/24 reviewed.     Past Medical History:   Diagnosis Date    Anxiety     Asthma     Atelectasis     Chronic constipation     GERD (gastroesophageal reflux disease)     Spontaneous pneumothorax 10/2012       Social History     Tobacco Use    Smoking status: Passive Smoke Exposure - Never Smoker    Smokeless tobacco: Former   Substance Use Topics    Alcohol use: No    Drug use: No     Review of patient's allergies indicates:  No Known Allergies    Review of Systems   Constitutional:  Negative for chills, fever and unexpected weight change.   Respiratory:  Negative for cough, shortness of breath and wheezing.    Cardiovascular:  Negative for chest pain and leg swelling.   Gastrointestinal:  Negative for abdominal pain, nausea and vomiting.   Integumentary:  Negative for rash.   Neurological:  Positive for headaches. Negative for weakness.       Objective: /74 (BP Location: Left arm, Patient Position: Sitting, BP Method: Large (Manual))   Pulse 98   Ht 6' 1" (1.854 m)   Wt 56.8 kg (125 lb 3.5 oz)   SpO2 98%   BMI 16.52 kg/m²         Physical Exam  Vitals reviewed.   Constitutional:       General: He is not in acute distress.     Appearance: He is well-developed. He is not ill-appearing.   HENT:      Head: Normocephalic and atraumatic. No raccoon eyes, abrasion, contusion, masses or laceration.        Right Ear: Tympanic membrane, ear canal and external ear normal.      Left Ear: Tympanic membrane, ear " canal and external ear normal.      Mouth/Throat:      Mouth: Mucous membranes are moist.      Pharynx: Oropharynx is clear.   Eyes:      Extraocular Movements: Extraocular movements intact.      Conjunctiva/sclera: Conjunctivae normal.      Pupils: Pupils are equal, round, and reactive to light.   Cardiovascular:      Rate and Rhythm: Normal rate and regular rhythm.      Heart sounds: No murmur heard.  Pulmonary:      Effort: Pulmonary effort is normal.      Breath sounds: Normal breath sounds. No wheezing or rales.   Musculoskeletal:      Cervical back: No rigidity. Muscular tenderness present. No spinous process tenderness.      Right lower leg: No edema.      Left lower leg: No edema.   Lymphadenopathy:      Cervical: No cervical adenopathy.   Skin:     General: Skin is warm and dry.      Findings: No rash.   Neurological:      Mental Status: He is alert and oriented to person, place, and time.      Cranial Nerves: No dysarthria or facial asymmetry.      Sensory: Sensation is intact.      Motor: Motor function is intact. No weakness, tremor, abnormal muscle tone or pronator drift.      Coordination: Coordination is intact. Finger-Nose-Finger Test normal.      Gait: Gait is intact. Gait normal.   Psychiatric:         Attention and Perception: Attention normal.         Mood and Affect: Mood is anxious.         Speech: Speech normal.         Behavior: Behavior normal. Behavior is cooperative.         Thought Content: Thought content normal. Thought content does not include homicidal or suicidal ideation.       Assessment:       Problem List Items Addressed This Visit    None  Visit Diagnoses       Nonintractable headache, unspecified chronicity pattern, unspecified headache type    -  Primary    Relevant Medications    naproxen (NAPROSYN) 500 MG tablet            Plan:       Darci was seen today for headache.    Diagnoses and all orders for this visit:    Nonintractable headache, unspecified chronicity pattern,  unspecified headache type  -     naproxen (NAPROSYN) 500 MG tablet; Take 1 tablet (500 mg total) by mouth 2 (two) times daily as needed.      Intermittent headaches  No red flags  No relieving measures tried  Advised trial of NSAIDs as above  Heat prn (neck)  Notify clinic if symptoms worsen or fail to improve.       ALINA ElliottC

## 2022-09-21 ENCOUNTER — OFFICE VISIT (OUTPATIENT)
Dept: PSYCHIATRY | Facility: CLINIC | Age: 27
End: 2022-09-21
Payer: COMMERCIAL

## 2022-09-21 VITALS
HEART RATE: 82 BPM | BODY MASS INDEX: 16.89 KG/M2 | WEIGHT: 131.63 LBS | DIASTOLIC BLOOD PRESSURE: 83 MMHG | HEIGHT: 74 IN | SYSTOLIC BLOOD PRESSURE: 124 MMHG

## 2022-09-21 DIAGNOSIS — F41.1 GENERALIZED ANXIETY DISORDER: ICD-10-CM

## 2022-09-21 DIAGNOSIS — F40.10 SOCIAL ANXIETY DISORDER: ICD-10-CM

## 2022-09-21 DIAGNOSIS — F33.1 MODERATE EPISODE OF RECURRENT MAJOR DEPRESSIVE DISORDER: Primary | ICD-10-CM

## 2022-09-21 PROCEDURE — 3008F BODY MASS INDEX DOCD: CPT | Mod: CPTII,S$GLB,, | Performed by: NURSE PRACTITIONER

## 2022-09-21 PROCEDURE — 99999 PR PBB SHADOW E&M-EST. PATIENT-LVL III: CPT | Mod: PBBFAC,,, | Performed by: NURSE PRACTITIONER

## 2022-09-21 PROCEDURE — 3074F PR MOST RECENT SYSTOLIC BLOOD PRESSURE < 130 MM HG: ICD-10-PCS | Mod: CPTII,S$GLB,, | Performed by: NURSE PRACTITIONER

## 2022-09-21 PROCEDURE — 90792 PSYCH DIAG EVAL W/MED SRVCS: CPT | Mod: S$GLB,,, | Performed by: NURSE PRACTITIONER

## 2022-09-21 PROCEDURE — 3079F DIAST BP 80-89 MM HG: CPT | Mod: CPTII,S$GLB,, | Performed by: NURSE PRACTITIONER

## 2022-09-21 PROCEDURE — 99999 PR PBB SHADOW E&M-EST. PATIENT-LVL III: ICD-10-PCS | Mod: PBBFAC,,, | Performed by: NURSE PRACTITIONER

## 2022-09-21 PROCEDURE — 3074F SYST BP LT 130 MM HG: CPT | Mod: CPTII,S$GLB,, | Performed by: NURSE PRACTITIONER

## 2022-09-21 PROCEDURE — 3008F PR BODY MASS INDEX (BMI) DOCUMENTED: ICD-10-PCS | Mod: CPTII,S$GLB,, | Performed by: NURSE PRACTITIONER

## 2022-09-21 PROCEDURE — 90792 PR PSYCHIATRIC DIAGNOSTIC EVALUATION W/MEDICAL SERVICES: ICD-10-PCS | Mod: S$GLB,,, | Performed by: NURSE PRACTITIONER

## 2022-09-21 PROCEDURE — 3079F PR MOST RECENT DIASTOLIC BLOOD PRESSURE 80-89 MM HG: ICD-10-PCS | Mod: CPTII,S$GLB,, | Performed by: NURSE PRACTITIONER

## 2022-09-21 NOTE — PROGRESS NOTES
"Outpatient Psychiatry Initial Visit (MD/NP)    9/21/2022    Darci Vaughan Jr., a 26 y.o. male, presenting for initial evaluation visit. Met with patient.    Reason for Encounter: self-referral. Patient complains of   Chief Complaint   Patient presents with    Anxiety    Depression     Chief compliant in patient's words: "health problems and life in general."    BRIEF SOCIAL HISTORY:    Living situation: lives with mother, step-father, and 1 sister  Relationships: no close friends  Academic hx: high school diploma. Socially isolative in high school, no close friends. Did ok academically.   Developmental hx: Born and raised in [place]. Parents  when patient was ~ 11 y/o. Has 2 sisters. No early life abuse  Occupational hx: employed at [employer] in [place], 1p - 1a, 35-40 hours weekly, since 2016.   Hobbies/activities: video games, horror movies, anime    HISTORY OF PRESENT ILLNESS:    Depression    Patient reports his mood is up and down. He presents with quiet speech and a flat affect. Eye contact is fair. He offers little spontaneously. He is dressed casually and appropriately with apparent adequate attention to hygiene. Patient reports that the major of the time he feels down, however he is "usually able to contain it." Reports that his mood has been worse the past few months, identifies stressors such as his computer getting hacked, also with continued lung troubles (recurrent pneumothorax, most recently last year), he worries about having another pneumothorax. Patient reports that the last time he felt depressed like this was in 2017. He would rate his mood as a 5/10 (0 being depressed with SI, 10 being content). Sometimes his Patient states that prior to the past few months his mood was better. He frequently has thoughts of not wanting to be alive, can fixate on this. Reports 1x experience with active SI a few weeks ago, in context of stressors, thought about his family and the effect it would " "have on them. Mother noticed he was not acting like himself, he told her what was happening, she is a positive support. He has had less motivation to shower, for the past few weeks. His energy level is variable, no excessive fatigue. Sleep has been disturbed the past week, early insomnia 3x, otherwise he has been sleeping ok. He reports his appetite has been good, he is purposefully eating more, gaining weight, 131 lb today. Patient is able to concentrate well at work and other daily activities.     Anxiety    Patient reports chronic generalized worry dating back to ~ 20 years old. Finds that he worries excessively most days. Anxiety can make him "uncomfortable," which patient describes as feeling restless. Patient believes that his anxiety can cause insomnia at times. Patient also endorses social anxiety, associated with fear that others will  him. This has been a persistent feeling since childhood and is the reason he mainly keeps to himself.    Patient reports hx of panic attacks in 2017, spontaneous, negatively impacted his work functioning. Patient now rarely has panic attacks, typically in response to stressors, endorses sx of heart racing, pacing, chest pain, sweatiness. Patient reports that his symptoms last for a few minutes. No agoraphobia, though he does avoid going to stores when he knows they'll be crowded.     Bipolar    The patient denies any history of manic symptoms, including grandiosity, persistent irritability, decreased need for sleep, racing thoughts, excessive goal-directed behavior, flight of ideas, increased energy, or risky/impulsive/erratic behavior in the absence of substance use.     Psychosis     The patient denies any history of psychotic symptoms, including AVH, paranoia, delusions, or thought disorder    OCD     The patient does not present with symptoms consistent with OCD -- absence of intrusive obsessions and accompanying time consuming compulsions.     ADHD    The patient " does not present with symptoms consistent with ADHD, no persist symptoms of inattention or hyperactivity dating back to childhood.    Eating disorder     No evident signs/symptoms of disordered eating.    Personality disorder    There is no evidence of a defined personality disorder        Trauma, abuse, and violence hx -- denies    Substance use -- non-smoker. No ETOH. Remote use of marijuana in 2016. No other hx of substance use    Legal hx -- denies    PSYCHIATRIC HISTORY:    No formal psychiatric hx. 1x initial visit for psychotherapy in 2017, never followed up. No hx of psychiatric hospitalizations. No hx of suicide attempts. No hx of violence towards others. No access to guns.     Current medications -- none    Past medications -- amitriptyline (by PCP in 2017)    Family MH history -- none known. No family hx of suicide.       MEDICAL HISTORY:     GERD, hx of recurrent pneumothorax. No regular use of OTC medications. No known allergies to medications. No hx of seizures. No cardiac dx. No thyroid dx.         Review Of Systems:     GENERAL:  No weight gain or loss  SKIN:  No rashes or lacerations  HEAD:  No headaches  EYES:  No exophthalmos, jaundice or blindness  EARS:  No dizziness, tinnitus or hearing loss  NOSE:  No changes in smell  MOUTH & THROAT:  No dyskinetic movements or obvious goiter  CHEST:  No shortness of breath, hyperventilation or cough  CARDIOVASCULAR:  No tachycardia or chest pain  ABDOMEN:  No nausea, vomiting, pain, constipation or diarrhea  URINARY:  No frequency, dysuria or sexual dysfunction  ENDOCRINE:  No polydipsia, polyuria  MUSCULOSKELETAL:  No pain or stiffness of the joints  NEUROLOGIC:  No weakness, sensory changes, seizures, confusion, memory loss, tremor or other abnormal movements    Current Evaluation:     Nutritional Screening: Considering the patient's height and weight, medications, medical history and preferences, should a referral be made to the dietitian?  "no    Constitutional  Vitals:  Most recent vital signs, dated less than 90 days prior to this appointment, were reviewed.    Vitals:    09/21/22 0857   BP: 124/83   Pulse: 82   Weight: 59.7 kg (131 lb 9.8 oz)   Height: 6' 2" (1.88 m)        General:  unremarkable, age appropriate, dressed casually, fair eye contact, tall and thin     Musculoskeletal  Muscle Strength/Tone:  no spasicity, no rigidity, no cogwheeling   Gait & Station:  non-ataxic     Psychiatric  Speech:  no latency; no press, monotone, offers little spontaneously    Mood & Affect:  depressed  flat, restricted   Thought Process:  normal and logical   Associations:  intact   Thought Content:  normal, no suicidality, no homicidality, delusions, or paranoia   Insight:  limited awareness of illness   Judgement: fair   Orientation:  grossly intact   Memory: intact for content of interview   Language: grossly intact   Attention Span & Concentration:  able to focus   Fund of Knowledge:  intact and appropriate to age and level of education       Relevant Elements of Neurological Exam: normal gait    Functioning in Relationships: see above    Laboratory Data  Lab Visit on 08/24/2022   Component Date Value Ref Range Status    WBC 08/24/2022 5.10  3.90 - 12.70 K/uL Final    RBC 08/24/2022 4.89  4.60 - 6.20 M/uL Final    Hemoglobin 08/24/2022 14.5  14.0 - 18.0 g/dL Final    Hematocrit 08/24/2022 45.4  40.0 - 54.0 % Final    MCV 08/24/2022 93  82 - 98 fL Final    MCH 08/24/2022 29.7  27.0 - 31.0 pg Final    MCHC 08/24/2022 31.9 (L)  32.0 - 36.0 g/dL Final    RDW 08/24/2022 14.5  11.5 - 14.5 % Final    Platelets 08/24/2022 257  150 - 450 K/uL Final    MPV 08/24/2022 10.5  9.2 - 12.9 fL Final    Immature Granulocytes 08/24/2022 0.2  0.0 - 0.5 % Final    Gran # (ANC) 08/24/2022 2.2  1.8 - 7.7 K/uL Final    Immature Grans (Abs) 08/24/2022 0.01  0.00 - 0.04 K/uL Final    Lymph # 08/24/2022 1.9  1.0 - 4.8 K/uL Final    Mono # 08/24/2022 0.7  0.3 - 1.0 K/uL Final    Eos # " 08/24/2022 0.2  0.0 - 0.5 K/uL Final    Baso # 08/24/2022 0.07  0.00 - 0.20 K/uL Final    nRBC 08/24/2022 0  0 /100 WBC Final    Gran % 08/24/2022 43.7  38.0 - 73.0 % Final    Lymph % 08/24/2022 37.3  18.0 - 48.0 % Final    Mono % 08/24/2022 14.1  4.0 - 15.0 % Final    Eosinophil % 08/24/2022 3.3  0.0 - 8.0 % Final    Basophil % 08/24/2022 1.4  0.0 - 1.9 % Final    Differential Method 08/24/2022 Automated   Final    Sodium 08/24/2022 136  136 - 145 mmol/L Final    Potassium 08/24/2022 4.5  3.5 - 5.1 mmol/L Final    Chloride 08/24/2022 99  95 - 110 mmol/L Final    CO2 08/24/2022 28  23 - 29 mmol/L Final    Glucose 08/24/2022 62 (L)  70 - 110 mg/dL Final    BUN 08/24/2022 4 (L)  6 - 20 mg/dL Final    Creatinine 08/24/2022 0.7  0.5 - 1.4 mg/dL Final    Calcium 08/24/2022 8.3 (L)  8.7 - 10.5 mg/dL Final    Total Protein 08/24/2022 7.3  6.0 - 8.4 g/dL Final    Albumin 08/24/2022 4.5  3.5 - 5.2 g/dL Final    Total Bilirubin 08/24/2022 0.7  0.1 - 1.0 mg/dL Final    Alkaline Phosphatase 08/24/2022 155 (H)  55 - 135 U/L Final    AST 08/24/2022 20  10 - 40 U/L Final    ALT 08/24/2022 13  10 - 44 U/L Final    Anion Gap 08/24/2022 9  8 - 16 mmol/L Final    eGFR 08/24/2022 >60.0  >60 mL/min/1.73 m^2 Final         Medications  Outpatient Encounter Medications as of 9/21/2022   Medication Sig Dispense Refill    albuterol (PROVENTIL/VENTOLIN HFA) 90 mcg/actuation inhaler Inhale 1 puff into the lungs every 12 (twelve) hours. And as needed for symptoms of wheezing and sob 6.7 g 11    esomeprazole (NEXIUM) 20 mg GrPS Take 20 mg by mouth before breakfast. (Patient not taking: Reported on 9/6/2022) 30 each 3    naproxen (NAPROSYN) 500 MG tablet Take 1 tablet (500 mg total) by mouth 2 (two) times daily as needed. 20 tablet 0     No facility-administered encounter medications on file as of 9/21/2022.           Assessment - Diagnosis - Goals:     Impression: Darci Kilgorelinda Gonzalez is a 26 y.o.  male with a psychiatric hx of  depression, anxiety and panic attacks presenting with symptoms consistent with MDD, URSZULA, and social anxiety. Medical hx is significant for GERD and recurrent pneumothorax. No known family MH hx. Patient with onset of depression and anxiety symptoms ~ 20 years old. Never with formal psychiatric treatment. No hx of suicide attempts or violence. No hx of drug abuse. 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 Lives with his parents        ICD-10-CM ICD-9-CM   1. Moderate episode of recurrent major depressive disorder  F33.1 296.32   2. Generalized anxiety disorder  F41.1 300.02   3. Social anxiety disorder  F40.10 300.23       Strengths and Liabilities: Strength: Patient accepts guidance/feedback, Strength: Patient is intelligent., Strength: Patient has positive support network., Liability: Patient lacks social skills., Liability: Patient lacks coping skills.    Treatment Goals:  Specify outcomes written in observable, behavioral terms:   Anxiety: reducing negative automatic thoughts, reducing physical symptoms of anxiety, and reducing time spent worrying (<30 minutes/day)  Depression: increasing energy, increasing motivation, reducing fatigue, and reducing negative automatic thoughts    Treatment Plan/Recommendations:   Reviewed patient's current symptoms  Discussed diagnoses and treatment options for anxiety and depression  Discussed psychotropic treatment and psychotherapy  Patient denies interest in medications at this time. Has concerns regarding side effects, apparently having to do with seeing his grandparents take medication  Psychoeducation provided on treatment with SSRIs, that they are generally speaking safe and effective  Referral placed to social work  Patient encouraged to follow up with writer if he would like to reconsider medication treatment.      Return to Clinic: as needed    Face-to-face time spent: 46 minutes  57 minutes total time spent. This includes face to face time and non-face to face time preparing to  see the patient (eg, review of tests), obtaining and/or reviewing separately obtained history, documenting clinical information in the electronic or other health record, independently interpreting results and communicating results to the patient/family/caregiver, or care coordinator.

## 2023-09-06 ENCOUNTER — HOSPITAL ENCOUNTER (OUTPATIENT)
Dept: RADIOLOGY | Facility: HOSPITAL | Age: 28
Discharge: HOME | End: 2023-09-06
Attending: INTERNAL MEDICINE
Payer: COMMERCIAL

## 2023-09-06 ENCOUNTER — OFFICE VISIT (OUTPATIENT)
Dept: INTERNAL MEDICINE | Facility: CLINIC | Age: 28
End: 2023-09-06
Payer: COMMERCIAL

## 2023-09-06 VITALS
HEART RATE: 83 BPM | SYSTOLIC BLOOD PRESSURE: 122 MMHG | WEIGHT: 134.5 LBS | OXYGEN SATURATION: 99 % | BODY MASS INDEX: 17.26 KG/M2 | HEIGHT: 74 IN | DIASTOLIC BLOOD PRESSURE: 70 MMHG

## 2023-09-06 DIAGNOSIS — Z00.00 ANNUAL PHYSICAL EXAM: Primary | ICD-10-CM

## 2023-09-06 DIAGNOSIS — J45.20 MILD INTERMITTENT ASTHMA WITHOUT COMPLICATION: ICD-10-CM

## 2023-09-06 DIAGNOSIS — J43.9 LUNG BLEBS: ICD-10-CM

## 2023-09-06 DIAGNOSIS — K21.9 GASTROESOPHAGEAL REFLUX DISEASE, UNSPECIFIED WHETHER ESOPHAGITIS PRESENT: ICD-10-CM

## 2023-09-06 DIAGNOSIS — Z87.09 HISTORY OF PNEUMOTHORAX: ICD-10-CM

## 2023-09-06 DIAGNOSIS — F43.23 ADJUSTMENT REACTION WITH ANXIETY AND DEPRESSION: ICD-10-CM

## 2023-09-06 PROCEDURE — 99999 PR PBB SHADOW E&M-EST. PATIENT-LVL IV: ICD-10-PCS | Mod: PBBFAC,,, | Performed by: INTERNAL MEDICINE

## 2023-09-06 PROCEDURE — 71046 X-RAY EXAM CHEST 2 VIEWS: CPT | Mod: TC

## 2023-09-06 PROCEDURE — 3078F DIAST BP <80 MM HG: CPT | Mod: CPTII,S$GLB,, | Performed by: INTERNAL MEDICINE

## 2023-09-06 PROCEDURE — 3008F BODY MASS INDEX DOCD: CPT | Mod: CPTII,S$GLB,, | Performed by: INTERNAL MEDICINE

## 2023-09-06 PROCEDURE — 71046 X-RAY EXAM CHEST 2 VIEWS: CPT | Mod: 26,,, | Performed by: RADIOLOGY

## 2023-09-06 PROCEDURE — 3074F PR MOST RECENT SYSTOLIC BLOOD PRESSURE < 130 MM HG: ICD-10-PCS | Mod: CPTII,S$GLB,, | Performed by: INTERNAL MEDICINE

## 2023-09-06 PROCEDURE — 1160F PR REVIEW ALL MEDS BY PRESCRIBER/CLIN PHARMACIST DOCUMENTED: ICD-10-PCS | Mod: CPTII,S$GLB,, | Performed by: INTERNAL MEDICINE

## 2023-09-06 PROCEDURE — 1159F PR MEDICATION LIST DOCUMENTED IN MEDICAL RECORD: ICD-10-PCS | Mod: CPTII,S$GLB,, | Performed by: INTERNAL MEDICINE

## 2023-09-06 PROCEDURE — 99395 PR PREVENTIVE VISIT,EST,18-39: ICD-10-PCS | Mod: S$GLB,,, | Performed by: INTERNAL MEDICINE

## 2023-09-06 PROCEDURE — 99395 PREV VISIT EST AGE 18-39: CPT | Mod: S$GLB,,, | Performed by: INTERNAL MEDICINE

## 2023-09-06 PROCEDURE — 1160F RVW MEDS BY RX/DR IN RCRD: CPT | Mod: CPTII,S$GLB,, | Performed by: INTERNAL MEDICINE

## 2023-09-06 PROCEDURE — 3078F PR MOST RECENT DIASTOLIC BLOOD PRESSURE < 80 MM HG: ICD-10-PCS | Mod: CPTII,S$GLB,, | Performed by: INTERNAL MEDICINE

## 2023-09-06 PROCEDURE — 1159F MED LIST DOCD IN RCRD: CPT | Mod: CPTII,S$GLB,, | Performed by: INTERNAL MEDICINE

## 2023-09-06 PROCEDURE — 3074F SYST BP LT 130 MM HG: CPT | Mod: CPTII,S$GLB,, | Performed by: INTERNAL MEDICINE

## 2023-09-06 PROCEDURE — 3008F PR BODY MASS INDEX (BMI) DOCUMENTED: ICD-10-PCS | Mod: CPTII,S$GLB,, | Performed by: INTERNAL MEDICINE

## 2023-09-06 PROCEDURE — 71046 XR CHEST PA AND LATERAL: ICD-10-PCS | Mod: 26,,, | Performed by: RADIOLOGY

## 2023-09-06 PROCEDURE — 99999 PR PBB SHADOW E&M-EST. PATIENT-LVL IV: CPT | Mod: PBBFAC,,, | Performed by: INTERNAL MEDICINE

## 2023-09-06 RX ORDER — ALBUTEROL SULFATE 90 UG/1
1 AEROSOL, METERED RESPIRATORY (INHALATION) EVERY 12 HOURS
Qty: 6.7 G | Refills: 11 | Status: SHIPPED | OUTPATIENT
Start: 2023-09-06 | End: 2024-09-05

## 2023-09-06 NOTE — PROGRESS NOTES
"Subjective:       Patient ID: Darci Vaughan Jr. is a 27 y.o. male.    Chief Complaint: Annual Exam  This is a 27-year-old who presents today for physical.  Patient reports in general has been doing well he continues to stay active and work he did go see Psychiatry last year and they referred him to social work for counseling for his anxiety and depression declined medications at that time did not make an appoint with social work but reports he may consider doing so.  He does take his Nexium on occasion when he has some GI issues but not on a regular basis.  He does get some trouble with sore throat on occasion no difficulty swallowing.  He had previous history of pneumothorax with mild intermittent asthma he does use an inhaler on occasion when needed and had previous treatment for his pneumothorax with no recurrence since last procedure     HPI  Review of Systems   Constitutional:  Negative for activity change and unexpected weight change.   HENT:  Negative for hearing loss, rhinorrhea and trouble swallowing.    Eyes:  Negative for discharge and visual disturbance.   Respiratory:  Negative for chest tightness and wheezing.    Cardiovascular:  Positive for chest pain. Negative for palpitations.   Gastrointestinal:  Negative for blood in stool, constipation, diarrhea and vomiting.   Endocrine: Negative for polydipsia and polyuria.   Genitourinary:  Negative for difficulty urinating, hematuria and urgency.   Musculoskeletal:  Negative for arthralgias, joint swelling and neck pain.   Neurological:  Positive for headaches. Negative for weakness.   Psychiatric/Behavioral:  Negative for confusion and dysphoric mood.        Objective:    Blood pressure 122/70, pulse 83, height 6' 2" (1.88 m), weight 61 kg (134 lb 7.7 oz), SpO2 99 %.   Physical Exam  Constitutional:       General: He is not in acute distress.     Comments: Thin but weigh tup    HENT:      Head: Normocephalic.      Mouth/Throat:      Pharynx: Oropharynx is " clear.   Eyes:      General: No scleral icterus.  Cardiovascular:      Rate and Rhythm: Normal rate and regular rhythm.      Heart sounds: Normal heart sounds. No murmur heard.     No friction rub. No gallop.   Pulmonary:      Effort: Pulmonary effort is normal. No respiratory distress.      Breath sounds: Normal breath sounds.      Comments: Squeaks left base   Abdominal:      General: Bowel sounds are normal.      Palpations: Abdomen is soft. There is no mass.      Tenderness: There is no abdominal tenderness.   Musculoskeletal:      Cervical back: Neck supple.   Skin:     Findings: No erythema.   Neurological:      Mental Status: He is alert.   Psychiatric:         Mood and Affect: Mood normal.         Assessment:       1. Annual physical exam    2. Mild intermittent asthma without complication    3. History of pneumothorax    4. Adjustment reaction with anxiety and depression    5. Lung blebs        Plan:       Darci was seen today for annual exam.    Diagnoses and all orders for this visit:    Annual physical exam  -     CBC Auto Differential; Future  -     Comprehensive Metabolic Panel; Future  -     Lipid Panel; Future  -     TSH; Future    Mild intermittent asthma without complication  History of conservative measures  -     X-Ray Chest PA And Lateral; Future    History of pneumothorax  Lung blebs  History of with treatment previously will update x-ray and review  -     X-Ray Chest PA And Lateral; Future    Adjustment reaction with anxiety and depression  Social anxiety   Discussed he is not interested in starting medication at this time he did see Psychiatry and was referred to social work for counseling he may consider      Other orders  -     albuterol (PROVENTIL/VENTOLIN HFA) 90 mcg/actuation inhaler; Inhale 1 puff into the lungs every 12 (twelve) hours. And as needed for symptoms of wheezing and sob    Will patient has worked on eating a bit better and his weight is trending up some over time continue  healthy dietary measures

## 2023-09-08 ENCOUNTER — TELEPHONE (OUTPATIENT)
Dept: INTERNAL MEDICINE | Facility: CLINIC | Age: 28
End: 2023-09-08
Payer: MEDICAID

## 2024-06-10 ENCOUNTER — PATIENT MESSAGE (OUTPATIENT)
Dept: INTERNAL MEDICINE | Facility: CLINIC | Age: 29
End: 2024-06-10
Payer: MEDICAID

## 2024-06-20 ENCOUNTER — TELEPHONE (OUTPATIENT)
Dept: INTERNAL MEDICINE | Facility: CLINIC | Age: 29
End: 2024-06-20
Payer: MEDICAID

## 2024-06-20 NOTE — TELEPHONE ENCOUNTER
----- Message from Nydia Whittaker sent at 6/20/2024 11:09 AM CDT -----  Contact: Self/ [phone]  Caller is requesting an earlier appointment then we can schedule.  Caller is requesting a message be sent to the provider.    If this is for urgent care symptoms, did you offer other providers at this location, providers at other locations, or [facility]? (yes, no, n/a):  n/a    If this is for the patients physical, did you offer to schedule next available and put on wait list, or to see NP or PA for their physical?  (yes, no, n/a):  n/a    When is the next available appointment with their provider:      Reason for the appointment:  funny smell to urine /loss of appetite     Patient preference of timeframe to be scheduled:  today or tomorrow     Would the patient like a call back, or a response through their [facility] portal?:   call back     Comments:  Symptoms: Urine Symptoms, Loss of Appetite  Outcome: Schedule a same-day appointment or talk to a nurse or provider within 1 hour.  Reason: Caller denied all higher acuity questions    The caller accepted this outcome

## 2024-06-20 NOTE — TELEPHONE ENCOUNTER
Offered pt 6/22  at 10:00 pt appt has been submitted to override scheduling Appt have been scheduled.

## 2024-06-21 ENCOUNTER — HOSPITAL ENCOUNTER (EMERGENCY)
Facility: HOSPITAL | Age: 29
Discharge: HOME | End: 2024-06-21
Attending: EMERGENCY MEDICINE
Payer: MEDICAID

## 2024-06-21 VITALS
HEART RATE: 88 BPM | WEIGHT: 125 LBS | TEMPERATURE: 98 F | RESPIRATION RATE: 16 BRPM | HEIGHT: 73 IN | BODY MASS INDEX: 16.57 KG/M2 | DIASTOLIC BLOOD PRESSURE: 69 MMHG | SYSTOLIC BLOOD PRESSURE: 118 MMHG | OXYGEN SATURATION: 99 %

## 2024-06-21 DIAGNOSIS — R10.13 EPIGASTRIC DISCOMFORT: ICD-10-CM

## 2024-06-21 DIAGNOSIS — R11.2 NAUSEA AND VOMITING, UNSPECIFIED VOMITING TYPE: Primary | ICD-10-CM

## 2024-06-21 LAB
ALBUMIN SERPL BCP-MCNC: 4.5 G/DL (ref 3.5–5.2)
ALP SERPL-CCNC: 138 U/L (ref 55–135)
ALT SERPL W/O P-5'-P-CCNC: 17 U/L (ref 10–44)
ANION GAP SERPL CALC-SCNC: 18 MMOL/L (ref 8–16)
AST SERPL-CCNC: 40 U/L (ref 10–40)
BACTERIA #/AREA URNS AUTO: NORMAL /HPF
BASOPHILS # BLD AUTO: 0.03 K/UL (ref 0–0.2)
BASOPHILS NFR BLD: 0.3 % (ref 0–1.9)
BILIRUB SERPL-MCNC: 1 MG/DL (ref 0.1–1)
BILIRUB UR QL STRIP: NEGATIVE
BUN SERPL-MCNC: 11 MG/DL (ref 6–20)
CALCIUM SERPL-MCNC: 9.1 MG/DL (ref 8.7–10.5)
CHLORIDE SERPL-SCNC: 100 MMOL/L (ref 95–110)
CLARITY UR REFRACT.AUTO: CLEAR
CO2 SERPL-SCNC: 15 MMOL/L (ref 23–29)
COLOR UR AUTO: YELLOW
CREAT SERPL-MCNC: 0.9 MG/DL (ref 0.5–1.4)
DIFFERENTIAL METHOD BLD: ABNORMAL
EOSINOPHIL # BLD AUTO: 0 K/UL (ref 0–0.5)
EOSINOPHIL NFR BLD: 0.1 % (ref 0–8)
ERYTHROCYTE [DISTWIDTH] IN BLOOD BY AUTOMATED COUNT: 15.5 % (ref 11.5–14.5)
EST. GFR  (NO RACE VARIABLE): >60 ML/MIN/1.73 M^2
GLUCOSE SERPL-MCNC: 79 MG/DL (ref 70–110)
GLUCOSE UR QL STRIP: NEGATIVE
HCT VFR BLD AUTO: 47.6 % (ref 40–54)
HCV AB SERPL QL IA: NORMAL
HGB BLD-MCNC: 16 G/DL (ref 14–18)
HGB UR QL STRIP: ABNORMAL
HIV 1+2 AB+HIV1 P24 AG SERPL QL IA: NORMAL
HYALINE CASTS UR QL AUTO: 0 /LPF
IMM GRANULOCYTES # BLD AUTO: 0.02 K/UL (ref 0–0.04)
IMM GRANULOCYTES NFR BLD AUTO: 0.2 % (ref 0–0.5)
KETONES UR QL STRIP: ABNORMAL
LEUKOCYTE ESTERASE UR QL STRIP: NEGATIVE
LIPASE SERPL-CCNC: 11 U/L (ref 4–60)
LYMPHOCYTES # BLD AUTO: 0.8 K/UL (ref 1–4.8)
LYMPHOCYTES NFR BLD: 9.6 % (ref 18–48)
MAGNESIUM SERPL-MCNC: 2.1 MG/DL (ref 1.6–2.6)
MCH RBC QN AUTO: 30.1 PG (ref 27–31)
MCHC RBC AUTO-ENTMCNC: 33.6 G/DL (ref 32–36)
MCV RBC AUTO: 90 FL (ref 82–98)
MICROSCOPIC COMMENT: NORMAL
MONOCYTES # BLD AUTO: 0.6 K/UL (ref 0.3–1)
MONOCYTES NFR BLD: 6.6 % (ref 4–15)
NEUTROPHILS # BLD AUTO: 7.3 K/UL (ref 1.8–7.7)
NEUTROPHILS NFR BLD: 83.2 % (ref 38–73)
NITRITE UR QL STRIP: NEGATIVE
NRBC BLD-RTO: 0 /100 WBC
PH UR STRIP: 6 [PH] (ref 5–8)
PLATELET # BLD AUTO: 322 K/UL (ref 150–450)
PMV BLD AUTO: 10.7 FL (ref 9.2–12.9)
POTASSIUM SERPL-SCNC: 5.4 MMOL/L (ref 3.5–5.1)
PROT SERPL-MCNC: 8.4 G/DL (ref 6–8.4)
PROT UR QL STRIP: ABNORMAL
RBC # BLD AUTO: 5.32 M/UL (ref 4.6–6.2)
RBC #/AREA URNS AUTO: 3 /HPF (ref 0–4)
SODIUM SERPL-SCNC: 133 MMOL/L (ref 136–145)
SP GR UR STRIP: 1.03 (ref 1–1.03)
SQUAMOUS #/AREA URNS AUTO: 0 /HPF
URN SPEC COLLECT METH UR: ABNORMAL
WBC # BLD AUTO: 8.75 K/UL (ref 3.9–12.7)
WBC #/AREA URNS AUTO: 5 /HPF (ref 0–5)

## 2024-06-21 PROCEDURE — 96361 HYDRATE IV INFUSION ADD-ON: CPT

## 2024-06-21 PROCEDURE — 83735 ASSAY OF MAGNESIUM: CPT | Performed by: STUDENT IN AN ORGANIZED HEALTH CARE EDUCATION/TRAINING PROGRAM

## 2024-06-21 PROCEDURE — 86803 HEPATITIS C AB TEST: CPT | Performed by: PHYSICIAN ASSISTANT

## 2024-06-21 PROCEDURE — 85025 COMPLETE CBC W/AUTO DIFF WBC: CPT | Performed by: STUDENT IN AN ORGANIZED HEALTH CARE EDUCATION/TRAINING PROGRAM

## 2024-06-21 PROCEDURE — 25000003 PHARM REV CODE 250

## 2024-06-21 PROCEDURE — 81001 URINALYSIS AUTO W/SCOPE: CPT | Performed by: STUDENT IN AN ORGANIZED HEALTH CARE EDUCATION/TRAINING PROGRAM

## 2024-06-21 PROCEDURE — 83690 ASSAY OF LIPASE: CPT | Performed by: STUDENT IN AN ORGANIZED HEALTH CARE EDUCATION/TRAINING PROGRAM

## 2024-06-21 PROCEDURE — 63600175 PHARM REV CODE 636 W HCPCS: Performed by: STUDENT IN AN ORGANIZED HEALTH CARE EDUCATION/TRAINING PROGRAM

## 2024-06-21 PROCEDURE — 96374 THER/PROPH/DIAG INJ IV PUSH: CPT

## 2024-06-21 PROCEDURE — 99285 EMERGENCY DEPT VISIT HI MDM: CPT | Mod: 25

## 2024-06-21 PROCEDURE — 80053 COMPREHEN METABOLIC PANEL: CPT | Performed by: STUDENT IN AN ORGANIZED HEALTH CARE EDUCATION/TRAINING PROGRAM

## 2024-06-21 PROCEDURE — 87389 HIV-1 AG W/HIV-1&-2 AB AG IA: CPT | Performed by: PHYSICIAN ASSISTANT

## 2024-06-21 RX ORDER — ONDANSETRON HYDROCHLORIDE 2 MG/ML
4 INJECTION, SOLUTION INTRAVENOUS
Status: COMPLETED | OUTPATIENT
Start: 2024-06-21 | End: 2024-06-21

## 2024-06-21 RX ORDER — ONDANSETRON 4 MG/1
4 TABLET, ORALLY DISINTEGRATING ORAL EVERY 6 HOURS PRN
Qty: 15 TABLET | Refills: 0 | Status: SHIPPED | OUTPATIENT
Start: 2024-06-21

## 2024-06-21 RX ORDER — FAMOTIDINE 10 MG/ML
20 INJECTION INTRAVENOUS
Status: COMPLETED | OUTPATIENT
Start: 2024-06-21 | End: 2024-06-21

## 2024-06-21 RX ADMIN — SODIUM CHLORIDE, POTASSIUM CHLORIDE, SODIUM LACTATE AND CALCIUM CHLORIDE 1000 ML: 600; 310; 30; 20 INJECTION, SOLUTION INTRAVENOUS at 06:06

## 2024-06-21 RX ADMIN — FAMOTIDINE 20 MG: 10 INJECTION INTRAVENOUS at 06:06

## 2024-06-21 RX ADMIN — ONDANSETRON 4 MG: 2 INJECTION INTRAMUSCULAR; INTRAVENOUS at 06:06

## 2024-06-21 NOTE — Clinical Note
"Darci Guallpajack Vaughan was seen and treated in our emergency department on 6/21/2024.  He may return to work on 06/24/2024.       If you have any questions or concerns, please don't hesitate to call.      Shelbie Roque RN    "

## 2024-06-21 NOTE — ED TRIAGE NOTES
Patient presents to the ED today with reports of abdominal pain radiating into chest onset yesterday. Pain with inspiration Hx of pneumothorax.     Patient identifiers verified and correct for Darci Vaughanhyun  LOC: The patient is awake, alert and aware of environment with an appropriate affect, the patient is oriented x 3 and speaking appropriately.   APPEARANCE: Patient appears comfortable and in no acute distress, patient is clean and well groomed.  SKIN: The skin is warm and dry, color consistent with ethnicity, patient has normal skin turgor and moist mucus membranes, skin intact, no breakdown or bruising noted.   MUSCULOSKELETAL: Patient moving all extremities spontaneously, no swelling noted.  RESPIRATORY: Airway is open and patent, respirations are spontaneous, patient has a normal effort and rate, no accessory muscle use noted, O2 sats noted at 98% on room air. Pain with inspiration  CARDIAC: Denies chest pain   GASTRO: abdominal pain   : Pt denies any pain or frequency with urination.  NEURO: AAOX4 Speech clear. Denies numbness or tingling to extremities

## 2024-06-21 NOTE — Clinical Note
"Darci Guallpajack Vaughan was seen and treated in our emergency department on 6/21/2024.  He may return to work on 06/24/2024.       If you have any questions or concerns, please don't hesitate to call.      Damion Abel RN    "

## 2024-06-21 NOTE — ED PROVIDER NOTES
Encounter Date: 6/21/2024       History     Chief Complaint   Patient presents with    Abdominal Pain     Vomited yest and then today, pain at navel area with breathing      28-year-old male with a history of anxiety, asthma, and multiple spontaneous pneumothoraces treated with endotracheal valve placement presents to the emergency department due to epigastric discomfort with complaints of nausea, decreased appetite, mild epigastric discomfort.  He reports epigastric discomfort is exacerbated by deep inspiration.  Patient reports symptoms has been going on for less than 24 hours.  Patient also reports that due to nausea and 1 episodes of emesis he was not been eating as much.  He states that he was an inhaler but he was not been using it frequently since symptoms onset.  He was not endorse a cough or shortness a breath.  He was not noticed any fevers.  No strenuous activity performed recently no diarrhea or constipation.  No dysuria or hematuria.      Review of patient's allergies indicates:  No Known Allergies  Past Medical History:   Diagnosis Date    Anxiety     Asthma     Atelectasis     Chronic constipation     GERD (gastroesophageal reflux disease)     Spontaneous pneumothorax 10/2012     Past Surgical History:   Procedure Laterality Date    BLEBECTOMY  Dec 2012    for recurrent spontaneous L pneumothorax    BRONCHOSCOPY WITH BIOPSY N/A 10/25/2021    Procedure: BRONCHOSCOPY, WITH BIOPSY, removal of endobronchial valve;  Surgeon: Zia Borrego MD;  Location: [facility] OR [address] [place] [address];  Service: Thoracic;  Laterality: N/A;    CHEST TUBE INSERTION      FLEXIBLE BRONCHOSCOPY WITH INSERTION OF ENDOBRONCHIAL VALVE N/A 6/28/2021    Procedure: BRONCHOSCOPY, FLEXIBLE, WITH ENDOBRONCHIAL VALVE INSERTION;  Surgeon: Zia Borrego MD;  Location: [facility] OR [address] [place] [address];  Service: Thoracic;  Laterality: N/A;  Left upper lobe    INJECTION OF ANESTHETIC AGENT AROUND MULTIPLE INTERCOSTAL NERVES Left 6/8/2021    Procedure: BLOCK,  NERVE, INTERCOSTAL, 2 OR MORE;  Surgeon: Zia Borrego MD;  Location: [facility] OR [address] [place] [address];  Service: Thoracic;  Laterality: Left;    PLEURODESIS WITH VIDEO-ASSISTED THORACOSCOPIC SURGERY (VATS) Left 6/8/2021    Procedure: VATS, WITH PLEURODESIS;  Surgeon: Zia Borrego MD;  [facility];  Service: Thoracic;  Laterality: Left;    THORACOSCOPIC WEDGE RESECTION OF LUNG Left 6/8/2021    Procedure: VATS, WITH WEDGE RESECTION, LUNG;  Surgeon: Zia Borrego MD;  Location: [facility] OR [address] [place] [address];  Service: Thoracic;  Laterality: Left;    UPPER GASTROINTESTINAL ENDOSCOPY       Family History   Problem Relation Name Age of Onset    Asthma Mother jeffrey     Asthma Father almita     Migraines Father almita     Cancer Maternal Grandmother          breast    Emphysema Maternal Grandfather      Seizures Sister eduardo mcclure     Allergies Sister eduardo mcclure     Migraines Sister eduardo mcclure     Colon cancer Neg Hx      Esophageal cancer Neg Hx      Stomach cancer Neg Hx       Social History     Tobacco Use    Smoking status: Never     Passive exposure: Yes    Smokeless tobacco: Former   Substance Use Topics    Alcohol use: No    Drug use: No     Review of Systems  See HPI  Physical Exam     Initial Vitals [06/21/24 1609]   BP Pulse Resp Temp SpO2   116/72 (!) 112 16 98.4 °F (36.9 °C) 98 %      MAP       --         Physical Exam    Vitals reviewed.  Constitutional: He appears well-developed and well-nourished.   Thin-appearing male   HENT:   Head: Normocephalic and atraumatic.   Eyes: Conjunctivae and EOM are normal.   Neck:   Normal range of motion.  Cardiovascular:  Normal rate and normal heart sounds.           Pulmonary/Chest: Breath sounds normal. No respiratory distress. He has no wheezes. He has no rales.   Abdominal: Abdomen is soft. He exhibits no distension. There is no abdominal tenderness.   Negative Wang's sign, no right upper quadrant or right lower quadrant abdominal tenderness.   Abdomen is flat, nondistended.  Soft of the touch.  That is only minimal discomfort with palpation of the epigastric region. There is no rebound.   Musculoskeletal:         General: Normal range of motion.      Cervical back: Normal range of motion.     Neurological: He is alert and oriented to person, place, and time.   Skin: Skin is warm and dry.   Psychiatric: He has a normal mood and affect. Thought content normal.         ED Course   Procedures  Labs Reviewed   CBC W/ AUTO DIFFERENTIAL - Abnormal; Notable for the following components:       Result Value    RDW 15.5 (*)     Lymph # 0.8 (*)     Gran % 83.2 (*)     Lymph % 9.6 (*)     All other components within normal limits    Narrative:     Release to patient->Immediate   COMPREHENSIVE METABOLIC PANEL - Abnormal; Notable for the following components:    Sodium 133 (*)     Potassium 5.4 (*)     CO2 15 (*)     Alkaline Phosphatase 138 (*)     Anion Gap 18 (*)     All other components within normal limits    Narrative:     Release to patient->Immediate   URINALYSIS, REFLEX TO URINE CULTURE - Abnormal; Notable for the following components:    Protein, UA 1+ (*)     Ketones, UA 3+ (*)     Occult Blood UA Trace (*)     All other components within normal limits    Narrative:     Specimen Source->Urine   LIPASE   MAGNESIUM    Narrative:     Release to patient->Immediate   HEPATITIS C ANTIBODY    Narrative:     Release to patient->Immediate   HIV 1 / 2 ANTIBODY    Narrative:     Release to patient->Immediate   URINALYSIS MICROSCOPIC    Narrative:     Specimen Source->Urine          Imaging Results              X-Ray Chest PA And Lateral (Final result)  Result time 06/21/24 20:32:45      Final result by Jordan Johnson MD (06/21/24 20:32:45)                   Impression:      1. No acute cardiopulmonary process.      Electronically signed by: Jordan Johnson MD  Date:    06/21/2024  Time:    20:32               Narrative:    EXAMINATION:  XR CHEST PA AND  LATERAL    CLINICAL HISTORY:  Shortness of breath    TECHNIQUE:  PA and lateral views of the chest were performed.    COMPARISON:  None    FINDINGS:  The cardiomediastinal silhouette is not enlarged.  There is no pleural effusion.  The trachea is midline.  The lungs are symmetrically expanded bilaterally without evidence of acute parenchymal process. No large focal consolidation seen.  There is no pneumothorax.  The osseous structures are unremarkable.                                       Medications   ondansetron injection 4 mg (4 mg Intravenous Given 6/21/24 1830)   lactated ringers bolus 1,000 mL (0 mLs Intravenous Stopped 6/21/24 1920)   famotidine (PF) injection 20 mg (20 mg Intravenous Given 6/21/24 1831)     Medical Decision Making  28-year-old male presents with epigastric discomfort and pleuritic pain exacerbated by deep inspiration. Symptoms suggestive of gastroenteritis with decreased oral intake and nausea.Bedside ultrasound was performed by attending that showed Z lines and sliding of ROSIE, on initial evaluation. Chest x-ray also negative for pneumonia or pneumothorax. Abdominal exam non-tender, no bowel changes noted.  No advanced imaging indicated at this time.  Absence of leukocytosis or fever. Managed symptomatically; patient tolerating oral intake upon reassessment with improvement of symptoms. Discharged home with mother, advised to return if pain worsens or sudden shortness of breath develops. Patient verbalized understanding and discussed discharge plan with supervising physician.      Amount and/or Complexity of Data Reviewed  Radiology: ordered.    Risk  Prescription drug management.                                      Clinical Impression:  Final diagnoses:  [R11.2] Nausea and vomiting, unspecified vomiting type (Primary)  [R10.13] Epigastric discomfort                 Shelbie Roque, BREEZY  06/21/24 4823

## 2024-06-21 NOTE — FIRST PROVIDER EVALUATION
"Medical screening examination initiated.  I have conducted a focused provider triage encounter, findings are as follows:    Brief history of present illness:  mid abdominal pain, hx of PTX, N/V x2    Vitals:    06/21/24 1609   BP: 116/72   Pulse: (!) 112   Resp: 16   Temp: 98.4 °F (36.9 °C)   TempSrc: Oral   SpO2: 98%   Weight: 56.7 kg (125 lb)   Height: 6' 1" (1.854 m)       Pertinent physical exam:  A&Ox3    Brief workup plan:  Labs, UA, CT, IVF    Preliminary workup initiated; this workup will be continued and followed by the physician or advanced practice provider that is assigned to the patient when roomed.  "

## 2024-06-22 ENCOUNTER — OFFICE VISIT (OUTPATIENT)
Dept: INTERNAL MEDICINE | Facility: CLINIC | Age: 29
End: 2024-06-22
Payer: MEDICAID

## 2024-06-22 VITALS
HEART RATE: 102 BPM | HEIGHT: 73 IN | OXYGEN SATURATION: 99 % | WEIGHT: 124.75 LBS | SYSTOLIC BLOOD PRESSURE: 100 MMHG | DIASTOLIC BLOOD PRESSURE: 60 MMHG | BODY MASS INDEX: 16.53 KG/M2

## 2024-06-22 DIAGNOSIS — R11.2 NAUSEA AND VOMITING, UNSPECIFIED VOMITING TYPE: ICD-10-CM

## 2024-06-22 DIAGNOSIS — F41.9 ANXIETY: Primary | ICD-10-CM

## 2024-06-22 DIAGNOSIS — J93.9 RECURRENT PNEUMOTHORAX: ICD-10-CM

## 2024-06-22 PROCEDURE — 99999 PR PBB SHADOW E&M-EST. PATIENT-LVL IV: CPT | Mod: PBBFAC,,, | Performed by: INTERNAL MEDICINE

## 2024-06-22 PROCEDURE — 1159F MED LIST DOCD IN RCRD: CPT | Mod: CPTII,,, | Performed by: INTERNAL MEDICINE

## 2024-06-22 PROCEDURE — 3078F DIAST BP <80 MM HG: CPT | Mod: CPTII,,, | Performed by: INTERNAL MEDICINE

## 2024-06-22 PROCEDURE — 99214 OFFICE O/P EST MOD 30 MIN: CPT | Mod: S$PBB,,, | Performed by: INTERNAL MEDICINE

## 2024-06-22 PROCEDURE — 3074F SYST BP LT 130 MM HG: CPT | Mod: CPTII,,, | Performed by: INTERNAL MEDICINE

## 2024-06-22 PROCEDURE — 99214 OFFICE O/P EST MOD 30 MIN: CPT | Mod: PBBFAC | Performed by: INTERNAL MEDICINE

## 2024-06-22 PROCEDURE — 3008F BODY MASS INDEX DOCD: CPT | Mod: CPTII,,, | Performed by: INTERNAL MEDICINE

## 2024-06-22 PROCEDURE — 1160F RVW MEDS BY RX/DR IN RCRD: CPT | Mod: CPTII,,, | Performed by: INTERNAL MEDICINE

## 2024-06-22 NOTE — PROGRESS NOTES
"Subjective:       Patient ID: Darci Vaughan Jr. is a 28 y.o. male.    Chief Complaint: Nausea and Anorexia  This is a 28-year-old who presents today for follow-up he started with some nausea and vomiting last week and had a few episodes felt like he was unable to keep food down he denies any abdominal pain or diarrhea he did go to the ER he got some fluids and antinausea medicine with some improvement so history reflux symptoms in the past and he used to take Nexium has not been taking that regularly he did get a prescription for anti nausea medicine but has not picked it up yet plans to do so    Nausea  Associated symptoms include nausea and a sore throat. Pertinent negatives include no abdominal pain or fever.     Review of Systems   Constitutional:  Negative for fever.   HENT:  Positive for sore throat.    Respiratory:  Negative for shortness of breath.    Gastrointestinal:  Positive for nausea. Negative for abdominal pain.       Objective:    Blood pressure 100/60, pulse 102, height 6' 1" (1.854 m), weight 56.6 kg (124 lb 12.5 oz), SpO2 99%.   Physical Exam  Constitutional:       General: He is not in acute distress.  HENT:      Head: Normocephalic.      Mouth/Throat:      Pharynx: Oropharynx is clear.   Cardiovascular:      Rate and Rhythm: Normal rate and regular rhythm.      Heart sounds: Normal heart sounds. No murmur heard.     No friction rub. No gallop.   Pulmonary:      Effort: Pulmonary effort is normal. No respiratory distress.      Breath sounds: Normal breath sounds.   Abdominal:      General: Bowel sounds are normal.      Palpations: Abdomen is soft. There is no mass.      Tenderness: There is no abdominal tenderness.   Neurological:      Mental Status: He is alert.         Assessment:       1. Anxiety    2. Nausea and vomiting, unspecified vomiting type    3. Recurrent pneumothorax        Plan:       Darci was seen today for nausea and anorexia.    Diagnoses and all orders for this " visit:    Anxiety  Discussed he would seen Psychiatry previously but would like to consider some counseling referral placed he will call to schedule  -     Ambulatory referral/consult to Psychology; Future    Nausea and vomiting, unspecified vomiting type  Recent episode of seems to be improving stick with fluids then advanced to a bland diet as tolerated    Recurrent pneumothorax  Following her intermittent asthma stable  History of has had previous procedures no current abnormality on x-ray    Return for his annual in September when due  Call with concern prior

## 2024-06-22 NOTE — DISCHARGE INSTRUCTIONS
I have provided you Zofran for nausea  Increase your fluid intake as you were dehydrated when you presented to the emergency department    If pain returns or worsens in you are no longer you to tolerate liquids or food by mouth in you should return for re-evaluation.

## 2024-07-06 ENCOUNTER — HOSPITAL ENCOUNTER (EMERGENCY)
Facility: HOSPITAL | Age: 29
Discharge: HOME | End: 2024-07-07
Attending: EMERGENCY MEDICINE

## 2024-07-06 DIAGNOSIS — J18.9 PNEUMONIA OF LEFT UPPER LOBE DUE TO INFECTIOUS ORGANISM: Primary | ICD-10-CM

## 2024-07-06 DIAGNOSIS — R05.9 COUGH: ICD-10-CM

## 2024-07-06 LAB
ALBUMIN SERPL BCP-MCNC: 3.6 G/DL (ref 3.5–5.2)
ALP SERPL-CCNC: 78 U/L (ref 55–135)
ALT SERPL W/O P-5'-P-CCNC: 11 U/L (ref 10–44)
ANION GAP SERPL CALC-SCNC: 11 MMOL/L (ref 8–16)
AST SERPL-CCNC: 21 U/L (ref 10–40)
BACTERIA #/AREA URNS AUTO: NORMAL /HPF
BASOPHILS # BLD AUTO: 0.02 K/UL (ref 0–0.2)
BASOPHILS NFR BLD: 0.3 % (ref 0–1.9)
BILIRUB SERPL-MCNC: 0.2 MG/DL (ref 0.1–1)
BILIRUB UR QL STRIP: NEGATIVE
BUN SERPL-MCNC: 4 MG/DL (ref 6–20)
CALCIUM SERPL-MCNC: 8.7 MG/DL (ref 8.7–10.5)
CHLORIDE SERPL-SCNC: 99 MMOL/L (ref 95–110)
CLARITY UR REFRACT.AUTO: CLEAR
CO2 SERPL-SCNC: 25 MMOL/L (ref 23–29)
COLOR UR AUTO: YELLOW
CREAT SERPL-MCNC: 0.8 MG/DL (ref 0.5–1.4)
DIFFERENTIAL METHOD BLD: ABNORMAL
EOSINOPHIL # BLD AUTO: 0 K/UL (ref 0–0.5)
EOSINOPHIL NFR BLD: 0.2 % (ref 0–8)
ERYTHROCYTE [DISTWIDTH] IN BLOOD BY AUTOMATED COUNT: 14.5 % (ref 11.5–14.5)
EST. GFR  (NO RACE VARIABLE): >60 ML/MIN/1.73 M^2
GLUCOSE SERPL-MCNC: 100 MG/DL (ref 70–110)
GLUCOSE UR QL STRIP: NEGATIVE
HCT VFR BLD AUTO: 44.7 % (ref 40–54)
HGB BLD-MCNC: 14.6 G/DL (ref 14–18)
HGB UR QL STRIP: NEGATIVE
HYALINE CASTS UR QL AUTO: 0 /LPF
IMM GRANULOCYTES # BLD AUTO: 0.03 K/UL (ref 0–0.04)
IMM GRANULOCYTES NFR BLD AUTO: 0.5 % (ref 0–0.5)
INFLUENZA A, MOLECULAR: NEGATIVE
INFLUENZA B, MOLECULAR: NEGATIVE
KETONES UR QL STRIP: ABNORMAL
LEUKOCYTE ESTERASE UR QL STRIP: NEGATIVE
LYMPHOCYTES # BLD AUTO: 0.8 K/UL (ref 1–4.8)
LYMPHOCYTES NFR BLD: 14.1 % (ref 18–48)
MCH RBC QN AUTO: 29.6 PG (ref 27–31)
MCHC RBC AUTO-ENTMCNC: 32.7 G/DL (ref 32–36)
MCV RBC AUTO: 91 FL (ref 82–98)
MICROSCOPIC COMMENT: NORMAL
MONOCYTES # BLD AUTO: 0.9 K/UL (ref 0.3–1)
MONOCYTES NFR BLD: 15.9 % (ref 4–15)
NEUTROPHILS # BLD AUTO: 4.1 K/UL (ref 1.8–7.7)
NEUTROPHILS NFR BLD: 69 % (ref 38–73)
NITRITE UR QL STRIP: NEGATIVE
NRBC BLD-RTO: 0 /100 WBC
PH UR STRIP: >8 [PH] (ref 5–8)
PLATELET # BLD AUTO: 222 K/UL (ref 150–450)
PMV BLD AUTO: 10.6 FL (ref 9.2–12.9)
POTASSIUM SERPL-SCNC: 3.9 MMOL/L (ref 3.5–5.1)
PROT SERPL-MCNC: 7.5 G/DL (ref 6–8.4)
PROT UR QL STRIP: ABNORMAL
RBC # BLD AUTO: 4.93 M/UL (ref 4.6–6.2)
RBC #/AREA URNS AUTO: 1 /HPF (ref 0–4)
SARS-COV-2 RDRP RESP QL NAA+PROBE: NEGATIVE
SODIUM SERPL-SCNC: 135 MMOL/L (ref 136–145)
SP GR UR STRIP: 1.01 (ref 1–1.03)
SPECIMEN SOURCE: NORMAL
SQUAMOUS #/AREA URNS AUTO: 0 /HPF
URN SPEC COLLECT METH UR: ABNORMAL
WBC # BLD AUTO: 5.9 K/UL (ref 3.9–12.7)
WBC #/AREA URNS AUTO: 1 /HPF (ref 0–5)

## 2024-07-06 PROCEDURE — 63700000 PHARM REV CODE 250 ALT 637 W/O HCPCS: Performed by: EMERGENCY MEDICINE

## 2024-07-06 PROCEDURE — 25000003 PHARM REV CODE 250: Performed by: EMERGENCY MEDICINE

## 2024-07-06 PROCEDURE — 96374 THER/PROPH/DIAG INJ IV PUSH: CPT

## 2024-07-06 PROCEDURE — 80053 COMPREHEN METABOLIC PANEL: CPT | Performed by: EMERGENCY MEDICINE

## 2024-07-06 PROCEDURE — 63600175 PHARM REV CODE 636 W HCPCS: Performed by: EMERGENCY MEDICINE

## 2024-07-06 PROCEDURE — 87502 INFLUENZA DNA AMP PROBE: CPT | Performed by: EMERGENCY MEDICINE

## 2024-07-06 PROCEDURE — 99284 EMERGENCY DEPT VISIT MOD MDM: CPT | Mod: 25

## 2024-07-06 PROCEDURE — U0002 COVID-19 LAB TEST NON-CDC: HCPCS | Performed by: EMERGENCY MEDICINE

## 2024-07-06 PROCEDURE — 81001 URINALYSIS AUTO W/SCOPE: CPT | Performed by: EMERGENCY MEDICINE

## 2024-07-06 PROCEDURE — 85025 COMPLETE CBC W/AUTO DIFF WBC: CPT | Performed by: EMERGENCY MEDICINE

## 2024-07-06 RX ORDER — AMOXICILLIN 500 MG/1
1000 CAPSULE ORAL
Status: COMPLETED | OUTPATIENT
Start: 2024-07-06 | End: 2024-07-06

## 2024-07-06 RX ORDER — AZITHROMYCIN 250 MG/1
500 TABLET, FILM COATED ORAL
Status: COMPLETED | OUTPATIENT
Start: 2024-07-06 | End: 2024-07-06

## 2024-07-06 RX ORDER — AMOXICILLIN 500 MG/1
1000 CAPSULE ORAL 3 TIMES DAILY
Qty: 21 CAPSULE | Refills: 0 | Status: SHIPPED | OUTPATIENT
Start: 2024-07-07 | End: 2024-07-14

## 2024-07-06 RX ORDER — AZITHROMYCIN 250 MG/1
250 TABLET, FILM COATED ORAL DAILY
Qty: 6 TABLET | Refills: 0 | Status: SHIPPED | OUTPATIENT
Start: 2024-07-07 | End: 2024-07-13

## 2024-07-06 RX ORDER — KETOROLAC TROMETHAMINE 30 MG/ML
15 INJECTION, SOLUTION INTRAMUSCULAR; INTRAVENOUS
Status: COMPLETED | OUTPATIENT
Start: 2024-07-06 | End: 2024-07-06

## 2024-07-06 RX ADMIN — SODIUM CHLORIDE 1000 ML: 9 INJECTION, SOLUTION INTRAVENOUS at 10:07

## 2024-07-06 RX ADMIN — AZITHROMYCIN DIHYDRATE 500 MG: 250 TABLET ORAL at 11:07

## 2024-07-06 RX ADMIN — AMOXICILLIN 1000 MG: 500 CAPSULE ORAL at 11:07

## 2024-07-06 RX ADMIN — KETOROLAC TROMETHAMINE 15 MG: 30 INJECTION, SOLUTION INTRAMUSCULAR; INTRAVENOUS at 10:07

## 2024-07-06 NOTE — Clinical Note
"Darci "Wilda Vaughan was seen and treated in our emergency department on 7/6/2024.  He may return to work on 07/09/2024.  Please excuse Mr. Vaughan from his work duties on 7/6-7/9/2024 as he is recovering from an acute illness.     If you have any questions or concerns, please don't hesitate to call.      Kaila Jin MD"

## 2024-07-07 VITALS
BODY MASS INDEX: 15.9 KG/M2 | RESPIRATION RATE: 16 BRPM | WEIGHT: 120 LBS | HEART RATE: 99 BPM | DIASTOLIC BLOOD PRESSURE: 73 MMHG | SYSTOLIC BLOOD PRESSURE: 113 MMHG | HEIGHT: 73 IN | TEMPERATURE: 99 F | OXYGEN SATURATION: 98 %

## 2024-07-07 RX ORDER — HYDROXYZINE HYDROCHLORIDE 25 MG/1
25 TABLET, FILM COATED ORAL 2 TIMES DAILY PRN
Qty: 12 TABLET | Refills: 0 | Status: SHIPPED | OUTPATIENT
Start: 2024-07-07

## 2024-07-07 NOTE — ED PROVIDER NOTES
Encounter Date: 7/6/2024       History     Chief Complaint   Patient presents with    Cough     Pt c/o cough and subjective fever since Monday.      HPI Patient is a 28M with a PMHx of asthma, spontaneous PTX who presents to the ED with with 5 days of persistent cough, subjective fevers/chills, fatigue. Patient notes he had to leave work early today because of his fatigue. He notes mild lower abd pain, no n/v, no UTI sxs, no flank pain, no rash, no headache.     Review of patient's allergies indicates:  No Known Allergies  Past Medical History:   Diagnosis Date    Anxiety     Asthma     Atelectasis     Chronic constipation     GERD (gastroesophageal reflux disease)     Spontaneous pneumothorax 10/2012     Past Surgical History:   Procedure Laterality Date    BLEBECTOMY  Dec 2012    for recurrent spontaneous L pneumothorax    BRONCHOSCOPY WITH BIOPSY N/A 10/25/2021    Procedure: BRONCHOSCOPY, WITH BIOPSY, removal of endobronchial valve;  Surgeon: Zia Borrego MD;  Location: [address];  Service: Thoracic;  Laterality: N/A;    CHEST TUBE INSERTION      FLEXIBLE BRONCHOSCOPY WITH INSERTION OF ENDOBRONCHIAL VALVE N/A 6/28/2021    Procedure: BRONCHOSCOPY, FLEXIBLE, WITH ENDOBRONCHIAL VALVE INSERTION;  Surgeon: Zia Borrego MD;  Location: [address];  Service: Thoracic;  Laterality: N/A;  Left upper lobe    INJECTION OF ANESTHETIC AGENT AROUND MULTIPLE INTERCOSTAL NERVES Left 6/8/2021    Procedure: BLOCK, NERVE, INTERCOSTAL, 2 OR MORE;  Surgeon: Zia Borrego MD;  Location: [address];  Service: Thoracic;  Laterality: Left;    PLEURODESIS WITH VIDEO-ASSISTED THORACOSCOPIC SURGERY (VATS) Left 6/8/2021    Procedure: VATS, WITH PLEURODESIS;  Surgeon: Zia Borrego MD;  Location: [address];  Service: Thoracic;  Laterality: Left;    THORACOSCOPIC WEDGE RESECTION OF LUNG Left 6/8/2021    Procedure: VATS, WITH WEDGE RESECTION, LUNG;  Surgeon: Zia Borrego MD;  [facility]: [facility]  OR 2ND FLR;  Service: Thoracic;  Laterality: Left;    UPPER GASTROINTESTINAL ENDOSCOPY       Family History   Problem Relation Name Age of Onset    Asthma Mother jeffrey     Asthma Father almita     Migraines Father almita     Cancer Maternal Grandmother          breast    Emphysema Maternal Grandfather      Seizures Sister eduardo mcclure     Allergies Sister eduardo mcclure     Migraines Sister eduardo mcclure     Colon cancer Neg Hx      Esophageal cancer Neg Hx      Stomach cancer Neg Hx       Social History     Tobacco Use    Smoking status: Never     Passive exposure: Yes    Smokeless tobacco: Former   Substance Use Topics    Alcohol use: No    Drug use: No         Physical Exam     Initial Vitals [07/06/24 2109]   BP Pulse Resp Temp SpO2   126/75 (!) 117 16 (!) 100.4 °F (38 °C) 98 %      MAP       --         Physical Exam     Nursing note and vitals reviewed.  Constitutional: Patient appears well-developed and well-nourished. No distress.   HENT:   Head: Normocephalic and atraumatic.   Mouth:  No tonsillar hypertrophy, no erythema, no purulence, uvula midline, no stridor, no drooling  Eyes: Conjunctivae and EOM are normal. Pupils are equal, round, and reactive to light.   Neck: Neck supple.   Normal range of motion.  Cardiovascular: Tachycardia, no m/g/r.   Pulmonary/Chest: Breath sounds normal.   Abdominal: Abdomen is soft. Patient exhibits no distension. There is no abdominal tenderness.   Musculoskeletal:      Cervical back: Normal range of motion and neck supple.   Neurological: Patient is alert and oriented to person, place, and time. No cranial nerve deficit. Gait normal. GCS score is 15.    Skin: Skin is warm and dry.  Psych: Normal mood/affect    ED Course   Procedures  Labs Reviewed   COMPREHENSIVE METABOLIC PANEL - Abnormal; Notable for the following components:       Result Value    Sodium 135 (*)     BUN 4 (*)     All other components within normal limits   CBC W/ AUTO DIFFERENTIAL -  Abnormal; Notable for the following components:    Lymph # 0.8 (*)     Lymph % 14.1 (*)     Mono % 15.9 (*)     All other components within normal limits   URINALYSIS, REFLEX TO URINE CULTURE - Abnormal; Notable for the following components:    pH, UA >8.0 (*)     Protein, UA 1+ (*)     Ketones, UA 1+ (*)     All other components within normal limits    Narrative:     Specimen Source->Urine   INFLUENZA A & B BY MOLECULAR   SARS-COV-2 RNA AMPLIFICATION, QUAL   URINALYSIS MICROSCOPIC    Narrative:     Specimen Source->Urine          Imaging Results              X-Ray Chest PA And Lateral (Final result)  Result time 07/06/24 23:00:36      Final result by Clarke Gordillo DO (07/06/24 23:00:36)                   Impression:      Left upper lobe pneumonia.      Electronically signed by: Clarke Gordillo  Date:    07/06/2024  Time:    23:00               Narrative:    EXAMINATION:  XR CHEST PA AND LATERAL    CLINICAL HISTORY:  Cough, unspecified    TECHNIQUE:  PA and lateral views of the chest were performed.    COMPARISON:  06/21/2024.    FINDINGS:  There are postoperative changes of partial left upper lobectomy.  There has been interval development of an airspace opacity in the left upper lung, compatible with pneumonia.  The lungs are otherwise clear.  The pleural spaces are clear.  The lungs are hyperexpanded.  The cardiac silhouette is unremarkable.  Osseous structures are intact.                                       Medications   sodium chloride 0.9% bolus 1,000 mL 1,000 mL (0 mLs Intravenous Stopped 7/6/24 2244)   ketorolac injection 15 mg (15 mg Intravenous Given 7/6/24 2230)   amoxicillin capsule 1,000 mg (1,000 mg Oral Given 7/6/24 2343)   azithromycin tablet 500 mg (500 mg Oral Given 7/6/24 2347)     Medical Decision Making  Amount and/or Complexity of Data Reviewed  Labs: ordered.  Radiology: ordered.    Risk  Prescription drug management.                     I have personally reviewed and interpreted the  patients laboratory studies: Flu/COVID neg, UA without evidence of UTI   I have personally reviewed and interpreted imaging studies: CXR with L sided infiltrate      No clinical evidence to suggest GABHS pharyngitis  No focal abd pain to suggest surgical emergency   No n/v/d to suggest gastroenteritis  Influenza negative  COVID negative   No UTI sxs to suggest UTI/pyelo    CXR concerning for L sided infiltrate    Patient's presentation is c/w pneumonia given fever, CXR with infiltrate     Patietn's sxs and VS improved with IVF, antipyretics    Patient received first dose of oral abx in the ED - will plan for azithromycin, amoxicillin for home with strict RTED precautions.                    Clinical Impression:  Final diagnoses:  [R05.9] Cough  [J18.9] Pneumonia of left upper lobe due to infectious organism (Primary)          ED Disposition Condition    Discharge Stable          ED Prescriptions       Medication Sig Dispense Start Date End Date Auth. Provider    amoxicillin (AMOXIL) 500 MG capsule Take 2 capsules (1,000 mg total) by mouth 3 (three) times daily. 21 capsule 7/7/2024 7/14/2024 Kaila Jin MD    azithromycin (Z-SIMON) 250 MG tablet FOLLOW AS DIRECTED ON PACKAGE 6 tablet 7/7/2024 7/13/2024 Kaila Jin MD    hydrOXYzine HCL (ATARAX) 25 MG tablet Take 1 tablet (25 mg total) by mouth 2 (two) times daily as needed for Anxiety (restless feet). 12 tablet 7/7/2024 -- Kaila Jin MD          Follow-up Information       Follow up With Specialties Details Why Contact Info    [facility] - Emergency Dept Emergency Medicine  As needed, If symptoms worsen [id number]  [facility] [id number] [phone]             Kaila Jin MD  07/08/24 [id number]

## 2024-07-07 NOTE — DISCHARGE INSTRUCTIONS
Please complete the entire course of antibiotics as prescribed.     You were discharged home with 2 antibiotics to treat your pneumonia.  The first is called amoxicillin-we recommend you take amoxicillin, 1 gram 3 times daily for 7 days total.  You can start taking this medication at breakfast time on 7/7 since you received the first dose in the ED today.   The second antibiotic is called azithromycin.  We recommend you take 250mg (1 tablet) daily, for 4 days. You can take your next dose on 7/7 at breakfast time since you got your first dose in the emergency department today.     Return to the ED immediately for any new chest pain, shortness of breath, severe abdominal pain, abdominal pain that is constant, nausea/and vomiting that does not stop on its own, severe headache, new numbness, tingling, or weakness anywhere, fever greater than 101F or any additional concerns.     You may take hydroxyzine, 1 tablet twice daily as needed for restlessness in your feet

## 2024-07-07 NOTE — ED NOTES
Darci Vaughan Jr., an 28 y.o. male presents to the ED     Chief Complaint   Patient presents with    Cough     Pt c/o cough and subjective fever since Monday.      Review of patient's allergies indicates:  No Known Allergies  Past Medical History:   Diagnosis Date    Anxiety     Asthma     Atelectasis     Chronic constipation     GERD (gastroesophageal reflux disease)     Spontaneous pneumothorax 10/2012

## 2024-09-13 ENCOUNTER — LAB VISIT (OUTPATIENT)
Dept: LAB | Facility: HOSPITAL | Age: 29
End: 2024-09-13
Attending: INTERNAL MEDICINE

## 2024-09-13 ENCOUNTER — OFFICE VISIT (OUTPATIENT)
Dept: INTERNAL MEDICINE | Facility: CLINIC | Age: 29
End: 2024-09-13

## 2024-09-13 VITALS
SYSTOLIC BLOOD PRESSURE: 116 MMHG | HEIGHT: 73 IN | WEIGHT: 129.88 LBS | BODY MASS INDEX: 17.21 KG/M2 | HEART RATE: 81 BPM | OXYGEN SATURATION: 98 % | DIASTOLIC BLOOD PRESSURE: 78 MMHG

## 2024-09-13 DIAGNOSIS — F41.9 ANXIETY: ICD-10-CM

## 2024-09-13 DIAGNOSIS — Z00.00 ANNUAL PHYSICAL EXAM: Primary | ICD-10-CM

## 2024-09-13 DIAGNOSIS — Z00.00 ANNUAL PHYSICAL EXAM: ICD-10-CM

## 2024-09-13 DIAGNOSIS — N34.3 DYSURIA-FREQUENCY SYNDROME: ICD-10-CM

## 2024-09-13 DIAGNOSIS — Z87.09 HISTORY OF PNEUMOTHORAX: ICD-10-CM

## 2024-09-13 LAB
BILIRUB UR QL STRIP: NEGATIVE
CLARITY UR REFRACT.AUTO: CLEAR
COLOR UR AUTO: COLORLESS
GLUCOSE UR QL STRIP: NEGATIVE
HGB UR QL STRIP: NEGATIVE
KETONES UR QL STRIP: NEGATIVE
LEUKOCYTE ESTERASE UR QL STRIP: NEGATIVE
NITRITE UR QL STRIP: NEGATIVE
PH UR STRIP: 8 [PH] (ref 5–8)
PROT UR QL STRIP: NEGATIVE
SP GR UR STRIP: 1 (ref 1–1.03)
URN SPEC COLLECT METH UR: ABNORMAL

## 2024-09-13 PROCEDURE — 99999 PR PBB SHADOW E&M-EST. PATIENT-LVL III: CPT | Mod: PBBFAC,,, | Performed by: INTERNAL MEDICINE

## 2024-09-13 PROCEDURE — 99213 OFFICE O/P EST LOW 20 MIN: CPT | Mod: PBBFAC | Performed by: INTERNAL MEDICINE

## 2024-09-13 PROCEDURE — 81003 URINALYSIS AUTO W/O SCOPE: CPT | Performed by: INTERNAL MEDICINE

## 2024-09-13 NOTE — PROGRESS NOTES
Subjective:       Patient ID: Darci Vaughan Jr. is a 28 y.o. male.    Chief Complaint: Annual Exam  This is a 28-year-old who presents today for physical patient reports that he has been doing better he had episode of pneumonia this summer that resolved he has had no recurrent symptoms he has been continuing to work he reports occasionally he has some discomfort with urination on and often an odor he would like to have that checked denies fever associated he had a recent urine which showed no significant infection when he was treated for pneumonia his pneumonias symptoms resolved he has had no further fever chills or cough.  Does have issues with anxiety and has previously seen Psychology he is planning on revisiting that to do some counseling does have family support he reports    Dysuria   This is a new problem. The current episode started more than 1 month ago. The problem occurs intermittently. The problem has been unchanged. The quality of the pain is described as burning. The pain is at a severity of 5/10. The pain is mild. There has been no fever. The fever has been present for Less than 1 day. He is Not sexually active. There is No history of pyelonephritis. Associated symptoms include flank pain, urgency and withholding. Pertinent negatives include no behavior changes, chills, discharge, frequency, hematuria, hesitancy, nausea, possible pregnancy, sweats, vomiting, weight loss, constipation or rash. He has tried nothing for the symptoms. The treatment provided no relief. There is no history of catheterization, diabetes insipidus, diabetes mellitus, genitourinary reflux, hypertension, kidney stones, recurrent UTIs, a single kidney, STD, urinary stasis or a urological procedure.     Review of Systems   Constitutional:  Negative for chills and weight loss.   Gastrointestinal:  Negative for constipation, nausea and vomiting.   Genitourinary:  Positive for dysuria, flank pain and urgency. Negative for  "frequency, hematuria and hesitancy.   Skin:  Negative for rash.       Objective:    Blood pressure 116/78, pulse 81, height 6' 1" (1.854 m), weight 58.9 kg (129 lb 13.6 oz), SpO2 98%.   Physical Exam  Constitutional:       General: He is not in acute distress.  HENT:      Head: Normocephalic.      Mouth/Throat:      Pharynx: Oropharynx is clear.   Eyes:      General: No scleral icterus.  Cardiovascular:      Rate and Rhythm: Normal rate and regular rhythm.      Heart sounds: Normal heart sounds. No murmur heard.     No friction rub. No gallop.   Pulmonary:      Effort: Pulmonary effort is normal. No respiratory distress.      Breath sounds: Normal breath sounds.   Abdominal:      General: Bowel sounds are normal.      Palpations: Abdomen is soft. There is no mass.      Tenderness: There is no abdominal tenderness.   Musculoskeletal:      Cervical back: Neck supple.   Skin:     Findings: No erythema.   Neurological:      Mental Status: He is alert.   Psychiatric:         Mood and Affect: Mood normal.         Assessment:       1. Annual physical exam    2. Dysuria-frequency syndrome    3. History of pneumothorax    4. Anxiety        Plan:       Darci was seen today for annual exam.    Diagnoses and all orders for this visit:    Annual physical exam  Update labs and review  -     CBC Auto Differential; Future  -     Comprehensive Metabolic Panel; Future  -     Lipid Panel; Future  -     TSH; Future  -     Urinalysis, Reflex to Urine Culture Urine, Clean Catch; Future    Dysuria-frequency syndrome  Will have him drop off and recheck  -     Urinalysis, Reflex to Urine Culture Urine, Clean Catch; Future    History of pneumothorax  With recent pneumonia asymptomatic    Anxiety  Discussed he plans on following psychology for counseling    We did discussed flu shot COVID vaccine and recommended Pneumovax he will consider in the future            "

## 2024-09-16 ENCOUNTER — LAB VISIT (OUTPATIENT)
Dept: LAB | Facility: HOSPITAL | Age: 29
End: 2024-09-16
Attending: INTERNAL MEDICINE

## 2024-09-16 DIAGNOSIS — Z00.00 ANNUAL PHYSICAL EXAM: ICD-10-CM

## 2024-09-16 LAB
ALBUMIN SERPL BCP-MCNC: 4.3 G/DL (ref 3.5–5.2)
ALP SERPL-CCNC: 123 U/L (ref 55–135)
ALT SERPL W/O P-5'-P-CCNC: 11 U/L (ref 10–44)
ANION GAP SERPL CALC-SCNC: 7 MMOL/L (ref 8–16)
AST SERPL-CCNC: 22 U/L (ref 10–40)
BASOPHILS # BLD AUTO: 0.07 K/UL (ref 0–0.2)
BASOPHILS NFR BLD: 1.4 % (ref 0–1.9)
BILIRUB SERPL-MCNC: 0.6 MG/DL (ref 0.1–1)
BUN SERPL-MCNC: 4 MG/DL (ref 6–20)
CALCIUM SERPL-MCNC: 8.7 MG/DL (ref 8.7–10.5)
CHLORIDE SERPL-SCNC: 103 MMOL/L (ref 95–110)
CHOLEST SERPL-MCNC: 124 MG/DL (ref 120–199)
CHOLEST/HDLC SERPL: 2.6 {RATIO} (ref 2–5)
CO2 SERPL-SCNC: 27 MMOL/L (ref 23–29)
CREAT SERPL-MCNC: 0.7 MG/DL (ref 0.5–1.4)
DIFFERENTIAL METHOD BLD: NORMAL
EOSINOPHIL # BLD AUTO: 0.1 K/UL (ref 0–0.5)
EOSINOPHIL NFR BLD: 1.4 % (ref 0–8)
ERYTHROCYTE [DISTWIDTH] IN BLOOD BY AUTOMATED COUNT: 13.9 % (ref 11.5–14.5)
EST. GFR  (NO RACE VARIABLE): >60 ML/MIN/1.73 M^2
GLUCOSE SERPL-MCNC: 84 MG/DL (ref 70–110)
HCT VFR BLD AUTO: 44.3 % (ref 40–54)
HDLC SERPL-MCNC: 48 MG/DL (ref 40–75)
HDLC SERPL: 38.7 % (ref 20–50)
HGB BLD-MCNC: 14.5 G/DL (ref 14–18)
IMM GRANULOCYTES # BLD AUTO: 0 K/UL (ref 0–0.04)
IMM GRANULOCYTES NFR BLD AUTO: 0 % (ref 0–0.5)
LDLC SERPL CALC-MCNC: 67.4 MG/DL (ref 63–159)
LYMPHOCYTES # BLD AUTO: 2.2 K/UL (ref 1–4.8)
LYMPHOCYTES NFR BLD: 44 % (ref 18–48)
MCH RBC QN AUTO: 29.6 PG (ref 27–31)
MCHC RBC AUTO-ENTMCNC: 32.7 G/DL (ref 32–36)
MCV RBC AUTO: 90 FL (ref 82–98)
MONOCYTES # BLD AUTO: 0.6 K/UL (ref 0.3–1)
MONOCYTES NFR BLD: 11.4 % (ref 4–15)
NEUTROPHILS # BLD AUTO: 2.1 K/UL (ref 1.8–7.7)
NEUTROPHILS NFR BLD: 41.8 % (ref 38–73)
NONHDLC SERPL-MCNC: 76 MG/DL
NRBC BLD-RTO: 0 /100 WBC
PLATELET # BLD AUTO: 229 K/UL (ref 150–450)
PMV BLD AUTO: 10.8 FL (ref 9.2–12.9)
POTASSIUM SERPL-SCNC: 3.6 MMOL/L (ref 3.5–5.1)
PROT SERPL-MCNC: 7.3 G/DL (ref 6–8.4)
RBC # BLD AUTO: 4.9 M/UL (ref 4.6–6.2)
SODIUM SERPL-SCNC: 137 MMOL/L (ref 136–145)
TRIGL SERPL-MCNC: 43 MG/DL (ref 30–150)
TSH SERPL DL<=0.005 MIU/L-ACNC: 1.66 UIU/ML (ref 0.4–4)
WBC # BLD AUTO: 5.09 K/UL (ref 3.9–12.7)

## 2024-09-16 PROCEDURE — 36415 COLL VENOUS BLD VENIPUNCTURE: CPT | Performed by: INTERNAL MEDICINE

## 2024-09-16 PROCEDURE — 80061 LIPID PANEL: CPT | Performed by: INTERNAL MEDICINE

## 2024-09-16 PROCEDURE — 80053 COMPREHEN METABOLIC PANEL: CPT | Performed by: INTERNAL MEDICINE

## 2024-09-16 PROCEDURE — 84443 ASSAY THYROID STIM HORMONE: CPT | Performed by: INTERNAL MEDICINE

## 2024-09-16 PROCEDURE — 85025 COMPLETE CBC W/AUTO DIFF WBC: CPT | Performed by: INTERNAL MEDICINE

## (undated) DEVICE — SEE MEDLINE ITEM 146313

## (undated) DEVICE — SYR SLIP TIP 20CC

## (undated) DEVICE — CATH AND LOADER DEPLOYMENT HUD

## (undated) DEVICE — SEE MEDLINE ITEM 157131

## (undated) DEVICE — CONTAINER SPECIMEN STRL 4OZ

## (undated) DEVICE — DRESSING TEGADERM 2X2 3/4

## (undated) DEVICE — GLOVE BIOGEL SKINSENSE PI 7.5

## (undated) DEVICE — SYS LABEL CORRECT MED

## (undated) DEVICE — TRAY MINOR GEN SURG

## (undated) DEVICE — DRESSING TELFA STRL 4X3 LF

## (undated) DEVICE — SEE MEDLINE ITEM 146416

## (undated) DEVICE — CATH EMBOLECTOMY 7FR 80CM

## (undated) DEVICE — SEE MEDLINE ITEM 152487

## (undated) DEVICE — BLADE ELECTRO EDGE INSULATED

## (undated) DEVICE — SUT VICRYL 3-0 27 SH

## (undated) DEVICE — SEE MEDLINE ITEM 152622

## (undated) DEVICE — ADHESIVE MASTISOL VIAL 48/BX

## (undated) DEVICE — NDL SPINAL 18GX3.5 SPINOCAN

## (undated) DEVICE — SUT MCRYL PLUS 4-0 PS2 27IN

## (undated) DEVICE — GAUZE SPONGE 4X4 12PLY

## (undated) DEVICE — DRAIN CHEST DRY SUCTION

## (undated) DEVICE — BOWL STERILE LARGE 32OZ

## (undated) DEVICE — ELECTRODE REM PLYHSV RETURN 9

## (undated) DEVICE — SPONGE DERMACEA 4X4IN 12PLY

## (undated) DEVICE — SEE MEDLINE ITEM 157117

## (undated) DEVICE — CLOSURE SKIN STERI STRIP 1/2X4

## (undated) DEVICE — ADAPTER SWIVEL

## (undated) DEVICE — Device

## (undated) DEVICE — GOWN SMART IMP BREATHABLE XXLG

## (undated) DEVICE — FORCEP JAW RADIAL PULM 2X100CM

## (undated) DEVICE — DRESSING TRANS 4X4 TEGADERM

## (undated) DEVICE — SUT SILK 0 BLK BR FSL 18 IN

## (undated) DEVICE — DRAPE INCISE IOBAN 2 23X17IN

## (undated) DEVICE — STOPCOCK DISCOFIX 3 WAY

## (undated) DEVICE — PACK SET UP CONVERTORS

## (undated) DEVICE — RELOAD ECHELON ENDOPATH 45MM

## (undated) DEVICE — DRAPE ABDOMINAL TIBURON 14X11

## (undated) DEVICE — TAPE SILK 3IN

## (undated) DEVICE — SPONGE GAUZE 16PLY 4X4

## (undated) DEVICE — SYR ONLY LUER LOCK 20CC

## (undated) DEVICE — ELECTRODE BLADE INSULATED 1 IN

## (undated) DEVICE — SUT VICRYL 2-0 27 CT-1

## (undated) DEVICE — SUT SILK 0 STRANDS 30IN BLK

## (undated) DEVICE — TUBING SUC UNIV W/CONN 12FT

## (undated) DEVICE — STAPLER ECHELON FLEX GST 45MM

## (undated) DEVICE — TOWEL OR DISP STRL BLUE 4/PK